# Patient Record
Sex: FEMALE | Race: WHITE | Employment: OTHER | URBAN - NONMETROPOLITAN AREA
[De-identification: names, ages, dates, MRNs, and addresses within clinical notes are randomized per-mention and may not be internally consistent; named-entity substitution may affect disease eponyms.]

---

## 2017-04-11 DIAGNOSIS — H81.02 MENIERE DISEASE, LEFT: ICD-10-CM

## 2017-04-11 DIAGNOSIS — J30.1 SEASONAL ALLERGIC RHINITIS DUE TO POLLEN: ICD-10-CM

## 2017-04-11 RX ORDER — MONTELUKAST SODIUM 10 MG/1
TABLET ORAL
Qty: 30 TABLET | Refills: 5 | Status: SHIPPED | OUTPATIENT
Start: 2017-04-11

## 2017-05-13 DIAGNOSIS — J30.1 SEASONAL ALLERGIC RHINITIS DUE TO POLLEN: ICD-10-CM

## 2017-05-13 DIAGNOSIS — H81.02 MENIERE DISEASE, LEFT: ICD-10-CM

## 2017-05-15 RX ORDER — FLUTICASONE PROPIONATE 50 MCG
SPRAY, SUSPENSION (ML) NASAL
Qty: 16 G | Refills: 3 | Status: SHIPPED | OUTPATIENT
Start: 2017-05-15

## 2017-08-15 ENCOUNTER — TELEPHONE (OUTPATIENT)
Dept: PRIMARY CARE CLINIC | Age: 47
End: 2017-08-15

## 2017-08-15 ENCOUNTER — HOSPITAL ENCOUNTER (OUTPATIENT)
Dept: GENERAL RADIOLOGY | Age: 47
Discharge: HOME | End: 2017-08-15
Payer: COMMERCIAL

## 2017-08-15 ENCOUNTER — OFFICE VISIT (OUTPATIENT)
Dept: PRIMARY CARE CLINIC | Age: 47
End: 2017-08-15
Payer: COMMERCIAL

## 2017-08-15 VITALS
TEMPERATURE: 99 F | BODY MASS INDEX: 22.49 KG/M2 | DIASTOLIC BLOOD PRESSURE: 80 MMHG | HEART RATE: 80 BPM | SYSTOLIC BLOOD PRESSURE: 126 MMHG | OXYGEN SATURATION: 99 % | WEIGHT: 135 LBS | HEIGHT: 65 IN

## 2017-08-15 DIAGNOSIS — R73.03 PREDIABETES: ICD-10-CM

## 2017-08-15 DIAGNOSIS — Z11.4 SCREENING FOR HIV WITHOUT PRESENCE OF RISK FACTORS: ICD-10-CM

## 2017-08-15 DIAGNOSIS — J42 CHRONIC BRONCHITIS, UNSPECIFIED CHRONIC BRONCHITIS TYPE (HCC): ICD-10-CM

## 2017-08-15 DIAGNOSIS — J44.9 COPD (CHRONIC OBSTRUCTIVE PULMONARY DISEASE) WITH CHRONIC BRONCHITIS (HCC): ICD-10-CM

## 2017-08-15 DIAGNOSIS — Z13.220 SCREENING FOR HYPERLIPIDEMIA: ICD-10-CM

## 2017-08-15 DIAGNOSIS — N39.0 URINARY TRACT INFECTION WITHOUT HEMATURIA, SITE UNSPECIFIED: ICD-10-CM

## 2017-08-15 DIAGNOSIS — N39.0 URINARY TRACT INFECTION WITHOUT HEMATURIA, SITE UNSPECIFIED: Primary | ICD-10-CM

## 2017-08-15 PROBLEM — J44.89 COPD (CHRONIC OBSTRUCTIVE PULMONARY DISEASE) WITH CHRONIC BRONCHITIS: Status: ACTIVE | Noted: 2017-08-15

## 2017-08-15 LAB
ALBUMIN SERPL-MCNC: 4.2 G/DL (ref 3.5–5.2)
ALP BLD-CCNC: 32 U/L (ref 35–104)
ALT SERPL-CCNC: 20 U/L (ref 5–33)
ANION GAP SERPL CALCULATED.3IONS-SCNC: 15 MMOL/L (ref 7–19)
AST SERPL-CCNC: 16 U/L (ref 5–32)
BASOPHILS ABSOLUTE: 0.1 K/UL (ref 0–0.2)
BASOPHILS RELATIVE PERCENT: 1 % (ref 0–1)
BILIRUB SERPL-MCNC: 0.3 MG/DL (ref 0.2–1.2)
BILIRUBIN, POC: ABNORMAL
BLOOD URINE, POC: ABNORMAL
BUN BLDV-MCNC: 11 MG/DL (ref 6–20)
CALCIUM SERPL-MCNC: 9.9 MG/DL (ref 8.6–10)
CHLORIDE BLD-SCNC: 99 MMOL/L (ref 98–111)
CLARITY, POC: CLEAR
CO2: 26 MMOL/L (ref 22–29)
COLOR, POC: YELLOW
CREAT SERPL-MCNC: 0.5 MG/DL (ref 0.5–0.9)
EOSINOPHILS ABSOLUTE: 0.1 K/UL (ref 0–0.6)
EOSINOPHILS RELATIVE PERCENT: 0.6 % (ref 0–5)
GFR NON-AFRICAN AMERICAN: >60
GLUCOSE BLD-MCNC: 136 MG/DL (ref 74–109)
GLUCOSE URINE, POC: ABNORMAL
HBA1C MFR BLD: 6.2 %
HCT VFR BLD CALC: 43 % (ref 37–47)
HEMOGLOBIN: 14.8 G/DL (ref 12–16)
KETONES, POC: ABNORMAL
LEUKOCYTE EST, POC: ABNORMAL
LYMPHOCYTES ABSOLUTE: 1.8 K/UL (ref 1.1–4.5)
LYMPHOCYTES RELATIVE PERCENT: 21 % (ref 20–40)
MCH RBC QN AUTO: 31.2 PG (ref 27–31)
MCHC RBC AUTO-ENTMCNC: 34.4 G/DL (ref 33–37)
MCV RBC AUTO: 90.5 FL (ref 81–99)
MONOCYTES ABSOLUTE: 0.7 K/UL (ref 0–0.9)
MONOCYTES RELATIVE PERCENT: 7.7 % (ref 0–10)
NEUTROPHILS ABSOLUTE: 6 K/UL (ref 1.5–7.5)
NEUTROPHILS RELATIVE PERCENT: 69.4 % (ref 50–65)
NITRITE, POC: ABNORMAL
PDW BLD-RTO: 11.4 % (ref 11.5–14.5)
PH, POC: 5.5
PLATELET # BLD: 291 K/UL (ref 130–400)
PMV BLD AUTO: 11.1 FL (ref 9.4–12.3)
POTASSIUM SERPL-SCNC: 4.2 MMOL/L (ref 3.5–5)
PROTEIN, POC: ABNORMAL
RBC # BLD: 4.75 M/UL (ref 4.2–5.4)
SODIUM BLD-SCNC: 140 MMOL/L (ref 136–145)
SPECIFIC GRAVITY, POC: 1.02
TOTAL PROTEIN: 7.2 G/DL (ref 6.6–8.7)
UROBILINOGEN, POC: 0.2
WBC # BLD: 8.6 K/UL (ref 4.8–10.8)

## 2017-08-15 PROCEDURE — 99214 OFFICE O/P EST MOD 30 MIN: CPT | Performed by: NURSE PRACTITIONER

## 2017-08-15 PROCEDURE — 81002 URINALYSIS NONAUTO W/O SCOPE: CPT | Performed by: NURSE PRACTITIONER

## 2017-08-15 PROCEDURE — 71020 XR CHEST STANDARD TWO VW: CPT

## 2017-08-15 RX ORDER — PHENAZOPYRIDINE HYDROCHLORIDE 100 MG/1
100 TABLET, FILM COATED ORAL 3 TIMES DAILY PRN
Qty: 9 TABLET | Refills: 0 | Status: SHIPPED | OUTPATIENT
Start: 2017-08-15 | End: 2017-09-05

## 2017-08-15 RX ORDER — NITROFURANTOIN 25; 75 MG/1; MG/1
100 CAPSULE ORAL 2 TIMES DAILY
Qty: 20 CAPSULE | Refills: 0 | Status: SHIPPED | OUTPATIENT
Start: 2017-08-15 | End: 2017-08-25

## 2017-08-15 ASSESSMENT — ENCOUNTER SYMPTOMS
SINUS PRESSURE: 0
SHORTNESS OF BREATH: 1
WHEEZING: 1
VOICE CHANGE: 1
CHEST TIGHTNESS: 0
EYES NEGATIVE: 1
COUGH: 0
BACK PAIN: 1
SORE THROAT: 0
GASTROINTESTINAL NEGATIVE: 1

## 2017-08-16 ENCOUNTER — TELEPHONE (OUTPATIENT)
Dept: PRIMARY CARE CLINIC | Age: 47
End: 2017-08-16

## 2017-08-17 ENCOUNTER — TELEPHONE (OUTPATIENT)
Dept: PRIMARY CARE CLINIC | Age: 47
End: 2017-08-17

## 2017-08-17 LAB — URINE CULTURE, ROUTINE: NORMAL

## 2017-09-05 ENCOUNTER — TELEPHONE (OUTPATIENT)
Dept: PRIMARY CARE CLINIC | Age: 47
End: 2017-09-05

## 2017-09-05 ENCOUNTER — OFFICE VISIT (OUTPATIENT)
Dept: PRIMARY CARE CLINIC | Age: 47
End: 2017-09-05
Payer: COMMERCIAL

## 2017-09-05 VITALS
TEMPERATURE: 98.6 F | DIASTOLIC BLOOD PRESSURE: 82 MMHG | BODY MASS INDEX: 22.16 KG/M2 | SYSTOLIC BLOOD PRESSURE: 124 MMHG | HEIGHT: 65 IN | WEIGHT: 133 LBS | OXYGEN SATURATION: 96 % | HEART RATE: 80 BPM

## 2017-09-05 DIAGNOSIS — J42 CHRONIC BRONCHITIS, UNSPECIFIED CHRONIC BRONCHITIS TYPE (HCC): Primary | ICD-10-CM

## 2017-09-05 PROCEDURE — 99213 OFFICE O/P EST LOW 20 MIN: CPT | Performed by: FAMILY MEDICINE

## 2017-09-05 RX ORDER — DOXYCYCLINE HYCLATE 100 MG
100 TABLET ORAL 2 TIMES DAILY
Qty: 20 TABLET | Refills: 0 | Status: SHIPPED | OUTPATIENT
Start: 2017-09-05 | End: 2017-09-15

## 2017-09-05 RX ORDER — METHYLPREDNISOLONE 4 MG/1
TABLET ORAL
Qty: 21 TABLET | Refills: 0 | Status: SHIPPED | OUTPATIENT
Start: 2017-09-05 | End: 2017-09-11

## 2017-09-05 ASSESSMENT — ENCOUNTER SYMPTOMS
SHORTNESS OF BREATH: 0
COLOR CHANGE: 0
SORE THROAT: 0
COUGH: 1

## 2017-10-31 ENCOUNTER — TELEPHONE (OUTPATIENT)
Dept: PRIMARY CARE CLINIC | Age: 47
End: 2017-10-31

## 2018-02-05 ENCOUNTER — HOSPITAL ENCOUNTER (OUTPATIENT)
Dept: GENERAL RADIOLOGY | Age: 48
Discharge: HOME | End: 2018-02-05
Payer: COMMERCIAL

## 2018-02-05 ENCOUNTER — OFFICE VISIT (OUTPATIENT)
Dept: URGENT CARE | Age: 48
End: 2018-02-05
Payer: COMMERCIAL

## 2018-02-05 VITALS
HEART RATE: 79 BPM | RESPIRATION RATE: 20 BRPM | BODY MASS INDEX: 22.42 KG/M2 | SYSTOLIC BLOOD PRESSURE: 138 MMHG | DIASTOLIC BLOOD PRESSURE: 82 MMHG | OXYGEN SATURATION: 99 % | WEIGHT: 134.6 LBS | HEIGHT: 65 IN | TEMPERATURE: 97.9 F

## 2018-02-05 DIAGNOSIS — M54.50 ACUTE MIDLINE LOW BACK PAIN WITHOUT SCIATICA: Primary | ICD-10-CM

## 2018-02-05 DIAGNOSIS — M54.50 ACUTE MIDLINE LOW BACK PAIN WITHOUT SCIATICA: ICD-10-CM

## 2018-02-05 DIAGNOSIS — M53.3 SACRAL PAIN: ICD-10-CM

## 2018-02-05 PROCEDURE — 72100 X-RAY EXAM L-S SPINE 2/3 VWS: CPT

## 2018-02-05 PROCEDURE — 72220 X-RAY EXAM SACRUM TAILBONE: CPT

## 2018-02-05 PROCEDURE — 96372 THER/PROPH/DIAG INJ SC/IM: CPT | Performed by: PHYSICIAN ASSISTANT

## 2018-02-05 PROCEDURE — 99213 OFFICE O/P EST LOW 20 MIN: CPT | Performed by: PHYSICIAN ASSISTANT

## 2018-02-05 RX ORDER — OMEPRAZOLE 20 MG/1
20 CAPSULE, DELAYED RELEASE ORAL DAILY
COMMUNITY
End: 2018-10-24

## 2018-02-05 RX ORDER — CYCLOBENZAPRINE HCL 5 MG
5 TABLET ORAL NIGHTLY PRN
Qty: 20 TABLET | Refills: 0 | Status: SHIPPED | OUTPATIENT
Start: 2018-02-05 | End: 2018-05-22

## 2018-02-05 RX ORDER — KETOROLAC TROMETHAMINE 30 MG/ML
30 INJECTION, SOLUTION INTRAMUSCULAR; INTRAVENOUS ONCE
Status: COMPLETED | OUTPATIENT
Start: 2018-02-05 | End: 2018-02-05

## 2018-02-05 RX ADMIN — KETOROLAC TROMETHAMINE 30 MG: 30 INJECTION, SOLUTION INTRAMUSCULAR; INTRAVENOUS at 09:12

## 2018-05-22 ENCOUNTER — HOSPITAL ENCOUNTER (OUTPATIENT)
Dept: CT IMAGING | Age: 48
Discharge: HOME | End: 2018-05-22
Payer: COMMERCIAL

## 2018-05-22 ENCOUNTER — TELEPHONE (OUTPATIENT)
Dept: PRIMARY CARE CLINIC | Age: 48
End: 2018-05-22

## 2018-05-22 ENCOUNTER — OFFICE VISIT (OUTPATIENT)
Dept: PRIMARY CARE CLINIC | Age: 48
End: 2018-05-22
Payer: COMMERCIAL

## 2018-05-22 VITALS
SYSTOLIC BLOOD PRESSURE: 152 MMHG | HEART RATE: 76 BPM | BODY MASS INDEX: 22.02 KG/M2 | HEIGHT: 65 IN | WEIGHT: 132.2 LBS | DIASTOLIC BLOOD PRESSURE: 110 MMHG | TEMPERATURE: 98.8 F | OXYGEN SATURATION: 99 %

## 2018-05-22 DIAGNOSIS — F80.9 DELAYED SPEECH: ICD-10-CM

## 2018-05-22 DIAGNOSIS — I10 ESSENTIAL HYPERTENSION: Primary | ICD-10-CM

## 2018-05-22 DIAGNOSIS — R42 DIZZINESS: ICD-10-CM

## 2018-05-22 DIAGNOSIS — Z13.220 SCREENING FOR HYPERLIPIDEMIA: ICD-10-CM

## 2018-05-22 LAB
ALBUMIN SERPL-MCNC: 4.3 G/DL (ref 3.5–5.2)
ALP BLD-CCNC: 37 U/L (ref 35–104)
ALT SERPL-CCNC: 22 U/L (ref 5–33)
ANION GAP SERPL CALCULATED.3IONS-SCNC: 16 MMOL/L (ref 7–19)
AST SERPL-CCNC: 15 U/L (ref 5–32)
BILIRUB SERPL-MCNC: <0.2 MG/DL (ref 0.2–1.2)
BUN BLDV-MCNC: 11 MG/DL (ref 6–20)
CALCIUM SERPL-MCNC: 9.3 MG/DL (ref 8.6–10)
CHLORIDE BLD-SCNC: 100 MMOL/L (ref 98–111)
CHOLESTEROL, TOTAL: 168 MG/DL (ref 160–199)
CO2: 24 MMOL/L (ref 22–29)
CREAT SERPL-MCNC: 0.6 MG/DL (ref 0.5–0.9)
GFR NON-AFRICAN AMERICAN: >60
GLUCOSE BLD-MCNC: 100 MG/DL (ref 74–109)
HCT VFR BLD CALC: 46.2 % (ref 37–47)
HDLC SERPL-MCNC: 50 MG/DL (ref 65–121)
HEMOGLOBIN: 15.7 G/DL (ref 12–16)
LDL CHOLESTEROL CALCULATED: 83 MG/DL
MCH RBC QN AUTO: 30.1 PG (ref 27–31)
MCHC RBC AUTO-ENTMCNC: 34 G/DL (ref 33–37)
MCV RBC AUTO: 88.5 FL (ref 81–99)
PDW BLD-RTO: 11.9 % (ref 11.5–14.5)
PLATELET # BLD: 289 K/UL (ref 130–400)
PMV BLD AUTO: 10.8 FL (ref 9.4–12.3)
POTASSIUM SERPL-SCNC: 3.9 MMOL/L (ref 3.5–5)
RBC # BLD: 5.22 M/UL (ref 4.2–5.4)
SODIUM BLD-SCNC: 140 MMOL/L (ref 136–145)
TOTAL PROTEIN: 7.3 G/DL (ref 6.6–8.7)
TRIGL SERPL-MCNC: 175 MG/DL (ref 0–149)
WBC # BLD: 9.4 K/UL (ref 4.8–10.8)

## 2018-05-22 PROCEDURE — 70450 CT HEAD/BRAIN W/O DYE: CPT

## 2018-05-22 PROCEDURE — 99214 OFFICE O/P EST MOD 30 MIN: CPT | Performed by: NURSE PRACTITIONER

## 2018-05-22 RX ORDER — LISINOPRIL 10 MG/1
10 TABLET ORAL DAILY
Qty: 30 TABLET | Refills: 3 | Status: SHIPPED | OUTPATIENT
Start: 2018-05-22 | End: 2018-08-29 | Stop reason: ALTCHOICE

## 2018-05-22 RX ORDER — GABAPENTIN 300 MG/1
300 CAPSULE ORAL NIGHTLY
COMMUNITY
End: 2018-08-29 | Stop reason: SDUPTHER

## 2018-05-22 ASSESSMENT — ENCOUNTER SYMPTOMS
COUGH: 0
SINUS PRESSURE: 0
DIARRHEA: 0
NAUSEA: 0
ABDOMINAL PAIN: 0
WHEEZING: 0
VOMITING: 0
PHOTOPHOBIA: 0
SHORTNESS OF BREATH: 0
BACK PAIN: 0

## 2018-05-22 ASSESSMENT — PATIENT HEALTH QUESTIONNAIRE - PHQ9
2. FEELING DOWN, DEPRESSED OR HOPELESS: 0
SUM OF ALL RESPONSES TO PHQ QUESTIONS 1-9: 0
1. LITTLE INTEREST OR PLEASURE IN DOING THINGS: 0
SUM OF ALL RESPONSES TO PHQ9 QUESTIONS 1 & 2: 0

## 2018-05-23 ENCOUNTER — TELEPHONE (OUTPATIENT)
Dept: PRIMARY CARE CLINIC | Age: 48
End: 2018-05-23

## 2018-05-24 ENCOUNTER — TELEPHONE (OUTPATIENT)
Dept: PRIMARY CARE CLINIC | Age: 48
End: 2018-05-24

## 2018-05-25 ENCOUNTER — TELEPHONE (OUTPATIENT)
Dept: PRIMARY CARE CLINIC | Age: 48
End: 2018-05-25

## 2018-05-29 ENCOUNTER — OFFICE VISIT (OUTPATIENT)
Dept: PRIMARY CARE CLINIC | Age: 48
End: 2018-05-29
Payer: COMMERCIAL

## 2018-05-29 VITALS
OXYGEN SATURATION: 98 % | HEART RATE: 80 BPM | RESPIRATION RATE: 18 BRPM | BODY MASS INDEX: 21.83 KG/M2 | DIASTOLIC BLOOD PRESSURE: 88 MMHG | WEIGHT: 131 LBS | HEIGHT: 65 IN | SYSTOLIC BLOOD PRESSURE: 138 MMHG

## 2018-05-29 DIAGNOSIS — R53.83 OTHER FATIGUE: ICD-10-CM

## 2018-05-29 DIAGNOSIS — L65.9 HAIR LOSS: ICD-10-CM

## 2018-05-29 DIAGNOSIS — M79.7 FIBROMYALGIA: ICD-10-CM

## 2018-05-29 DIAGNOSIS — E78.00 ELEVATED CHOLESTEROL: ICD-10-CM

## 2018-05-29 DIAGNOSIS — R03.0 ELEVATED BLOOD PRESSURE, SITUATIONAL: ICD-10-CM

## 2018-05-29 DIAGNOSIS — R23.2 HOT FLASHES: ICD-10-CM

## 2018-05-29 DIAGNOSIS — E55.9 VITAMIN D DEFICIENCY: ICD-10-CM

## 2018-05-29 DIAGNOSIS — F41.9 ANXIETY: ICD-10-CM

## 2018-05-29 DIAGNOSIS — R73.9 ELEVATED BLOOD SUGAR: ICD-10-CM

## 2018-05-29 DIAGNOSIS — G47.00 INSOMNIA, UNSPECIFIED TYPE: Primary | ICD-10-CM

## 2018-05-29 DIAGNOSIS — F32.A DEPRESSION, UNSPECIFIED DEPRESSION TYPE: ICD-10-CM

## 2018-05-29 DIAGNOSIS — Z87.898 HISTORY OF PITUITARY TUMOR: ICD-10-CM

## 2018-05-29 DIAGNOSIS — G89.29 OTHER CHRONIC PAIN: ICD-10-CM

## 2018-05-29 PROCEDURE — 99214 OFFICE O/P EST MOD 30 MIN: CPT | Performed by: NURSE PRACTITIONER

## 2018-05-29 RX ORDER — BUSPIRONE HYDROCHLORIDE 10 MG/1
TABLET ORAL
Qty: 90 TABLET | Refills: 2 | Status: SHIPPED | OUTPATIENT
Start: 2018-05-29 | End: 2018-12-12 | Stop reason: SDUPTHER

## 2018-05-29 RX ORDER — AMITRIPTYLINE HYDROCHLORIDE 25 MG/1
25 TABLET, FILM COATED ORAL NIGHTLY
Qty: 30 TABLET | Refills: 3 | Status: SHIPPED | OUTPATIENT
Start: 2018-05-29 | End: 2018-07-27 | Stop reason: SDUPTHER

## 2018-05-29 ASSESSMENT — ENCOUNTER SYMPTOMS
ABDOMINAL PAIN: 0
BLOOD IN STOOL: 0
TROUBLE SWALLOWING: 0
COUGH: 0
VOICE CHANGE: 0
CONSTIPATION: 0
WHEEZING: 0
VOMITING: 0
NAUSEA: 0
DIARRHEA: 0
CHEST TIGHTNESS: 0
SHORTNESS OF BREATH: 0
SORE THROAT: 0
RHINORRHEA: 0
EYE REDNESS: 0

## 2018-06-06 DIAGNOSIS — R73.9 ELEVATED BLOOD SUGAR: ICD-10-CM

## 2018-06-06 DIAGNOSIS — F41.9 ANXIETY: ICD-10-CM

## 2018-06-06 DIAGNOSIS — E78.00 ELEVATED CHOLESTEROL: ICD-10-CM

## 2018-06-06 DIAGNOSIS — G47.00 INSOMNIA, UNSPECIFIED TYPE: ICD-10-CM

## 2018-06-06 DIAGNOSIS — L65.9 HAIR LOSS: ICD-10-CM

## 2018-06-06 DIAGNOSIS — E55.9 VITAMIN D DEFICIENCY: ICD-10-CM

## 2018-06-06 DIAGNOSIS — R23.2 HOT FLASHES: ICD-10-CM

## 2018-06-06 DIAGNOSIS — G89.29 OTHER CHRONIC PAIN: ICD-10-CM

## 2018-06-06 DIAGNOSIS — R53.83 OTHER FATIGUE: ICD-10-CM

## 2018-06-06 DIAGNOSIS — Z87.898 HISTORY OF PITUITARY TUMOR: ICD-10-CM

## 2018-06-06 LAB
BILIRUBIN URINE: NEGATIVE
BLOOD, URINE: NEGATIVE
C-REACTIVE PROTEIN: <0.03 MG/DL (ref 0–0.5)
CHOLESTEROL, TOTAL: 157 MG/DL (ref 160–199)
CLARITY: ABNORMAL
COLOR: ABNORMAL
ESTRADIOL LEVEL: 93.9 PG/ML
FOLLICLE STIMULATING HORMONE: 10.3 MIU/ML
GLUCOSE URINE: NEGATIVE MG/DL
HBA1C MFR BLD: 5.5 %
HDLC SERPL-MCNC: 42 MG/DL (ref 65–121)
KETONES, URINE: NEGATIVE MG/DL
LDL CHOLESTEROL CALCULATED: 96 MG/DL
LEUKOCYTE ESTERASE, URINE: NEGATIVE
LUTEINIZING HORMONE: 9.5 MIU/ML
NITRITE, URINE: NEGATIVE
PH UA: 6
PROGESTERONE LEVEL: 0.15 NG/ML
PROTEIN UA: NEGATIVE MG/DL
RHEUMATOID FACTOR: <10 IU/ML
SPECIFIC GRAVITY UA: 1.03
T4 FREE: 1.5 NG/DL (ref 0.9–1.7)
TESTOSTERONE TOTAL: 11.4 NG/DL (ref 8.4–48.1)
TRIGL SERPL-MCNC: 95 MG/DL (ref 0–149)
TSH SERPL DL<=0.05 MIU/L-ACNC: 2.75 UIU/ML (ref 0.27–4.2)
UROBILINOGEN, URINE: 0.2 E.U./DL
VITAMIN B-12: 461 PG/ML (ref 211–946)

## 2018-06-07 LAB
PROLACTIN: 18.1 NG/ML (ref 2.8–26)
THYROGLOBULIN AB: <0.9 IU/ML (ref 0–4)
THYROID PEROXIDASE (TPO) ABS: 2.1 IU/ML (ref 0–9)

## 2018-06-08 LAB — ANA IGG, ELISA: NORMAL

## 2018-06-11 LAB
ESTRADIOL LEVEL: 93.9 PG/ML
ESTROGEN TOTAL: 138.8 PG/ML
ESTRONE: 44.9 PG/ML
VITAMIN D2 AND D3, TOTAL: 50.8 NG/ML (ref 30–80)
VITAMIN D2, 25 HYDROXY: 3.4 NG/ML
VITAMIN D3,25 HYDROXY: 47.4 NG/ML

## 2018-06-12 ENCOUNTER — PATIENT MESSAGE (OUTPATIENT)
Dept: PRIMARY CARE CLINIC | Age: 48
End: 2018-06-12

## 2018-07-27 ENCOUNTER — OFFICE VISIT (OUTPATIENT)
Dept: PRIMARY CARE CLINIC | Age: 48
End: 2018-07-27
Payer: COMMERCIAL

## 2018-07-27 ENCOUNTER — TELEPHONE (OUTPATIENT)
Dept: PRIMARY CARE CLINIC | Age: 48
End: 2018-07-27

## 2018-07-27 VITALS
HEIGHT: 65 IN | OXYGEN SATURATION: 100 % | DIASTOLIC BLOOD PRESSURE: 73 MMHG | HEART RATE: 79 BPM | WEIGHT: 132.8 LBS | SYSTOLIC BLOOD PRESSURE: 133 MMHG | BODY MASS INDEX: 22.13 KG/M2 | TEMPERATURE: 98.4 F

## 2018-07-27 DIAGNOSIS — M79.7 FIBROMYALGIA: ICD-10-CM

## 2018-07-27 DIAGNOSIS — N95.2 VAGINAL ATROPHY: ICD-10-CM

## 2018-07-27 DIAGNOSIS — M54.9 CHRONIC BACK PAIN, UNSPECIFIED BACK LOCATION, UNSPECIFIED BACK PAIN LATERALITY: Primary | ICD-10-CM

## 2018-07-27 DIAGNOSIS — G47.00 INSOMNIA, UNSPECIFIED TYPE: ICD-10-CM

## 2018-07-27 DIAGNOSIS — G89.29 OTHER CHRONIC PAIN: ICD-10-CM

## 2018-07-27 DIAGNOSIS — F32.A DEPRESSION, UNSPECIFIED DEPRESSION TYPE: ICD-10-CM

## 2018-07-27 DIAGNOSIS — G89.29 CHRONIC BACK PAIN, UNSPECIFIED BACK LOCATION, UNSPECIFIED BACK PAIN LATERALITY: Primary | ICD-10-CM

## 2018-07-27 PROCEDURE — 99214 OFFICE O/P EST MOD 30 MIN: CPT | Performed by: NURSE PRACTITIONER

## 2018-07-27 RX ORDER — AMITRIPTYLINE HYDROCHLORIDE 25 MG/1
50 TABLET, FILM COATED ORAL NIGHTLY
Qty: 60 TABLET | Refills: 2 | Status: SHIPPED | OUTPATIENT
Start: 2018-07-27 | End: 2018-08-29 | Stop reason: SDUPTHER

## 2018-07-27 RX ORDER — DESVENLAFAXINE 25 MG/1
25 TABLET, EXTENDED RELEASE ORAL DAILY
Qty: 60 TABLET | Refills: 1 | Status: SHIPPED | OUTPATIENT
Start: 2018-07-27 | End: 2018-08-29 | Stop reason: SDUPTHER

## 2018-07-27 NOTE — PATIENT INSTRUCTIONS
Increase amitriptyline to 50mg at bedtime. Begin pristiq as directed. Begin premarin vaginal cream as directed. Follow up in one month.

## 2018-07-27 NOTE — PROGRESS NOTES
2000?    ?    HYSTERECTOMY      LIVER BIOPSY  07/2015    Dr. Mcgowan Fairly History    Marital status:      Spouse name: N/A    Number of children: N/A    Years of education: N/A     Social History Main Topics    Smoking status: Never Smoker    Smokeless tobacco: Never Used    Alcohol use No    Drug use: No    Sexual activity: Not Asked     Other Topics Concern    None     Social History Narrative    None       Family History   Problem Relation Age of Onset    Colon Polyps Paternal Grandfather     Liver Cancer Paternal Grandfather     Colon Polyps Mother     Liver Cancer Mother     Heart Disease Mother     Cancer Mother     Diabetes Mother     Colon Polyps Maternal Grandmother     Heart Disease Father     Cancer Father     Colon Cancer Neg Hx     Esophageal Cancer Neg Hx     Liver Disease Neg Hx     Rectal Cancer Neg Hx     Stomach Cancer Neg Hx        Current Outpatient Prescriptions   Medication Sig Dispense Refill    amitriptyline (ELAVIL) 25 MG tablet Take 2 tablets by mouth nightly 60 tablet 2    conjugated estrogens (PREMARIN) 0.625 MG/GM vaginal cream Place vaginally at bedtime three times a week. 1 Tube 2    desvenlafaxine succinate (PRISTIQ) 25 MG TB24 extended release tablet Take 1 tablet by mouth daily Take one tablet daily for 3 days then increase to two tablets daily. 60 tablet 1    busPIRone (BUSPAR) 10 MG tablet Take tid prn anxiety. 90 tablet 2    gabapentin (NEURONTIN) 300 MG capsule Take 300 mg by mouth nightly. [place] omeprazole (PRILOSEC) 20 MG delayed release capsule Take 20 mg by mouth daily      Cholecalciferol (VITAMIN D3) 73221 UNITS CAPS Take by mouth      fluticasone (FLONASE) 50 MCG/ACT nasal spray 1 spray by Nasal route daily      ondansetron (ZOFRAN ODT) 4 MG disintegrating tablet Take 1 tablet by mouth every 8 hours as needed for Nausea or Vomiting 10 tablet 0    lisinopril (PRINIVIL;ZESTRIL) 10 MG tablet Take 1 tablet by mouth daily 30 tablet 3     No current facility-administered medications for this visit. Allergies   Allergen Reactions    Latex Rash, Itching, Swelling and Hives    Morphine Anaphylaxis    Morphine And Related Anaphylaxis       Lab Review not applicable    Subjective:   Review of Systems   Constitutional: Positive for fatigue. Negative for activity change, appetite change, fever and unexpected weight change. HENT: Negative for congestion, ear pain, nosebleeds, rhinorrhea, sore throat, trouble swallowing and voice change. Eyes: Negative for redness and visual disturbance. Respiratory: Negative for cough, chest tightness, shortness of breath and wheezing. Cardiovascular: Negative for chest pain, palpitations and leg swelling. Gastrointestinal: Negative for abdominal pain, blood in stool, constipation, diarrhea, nausea and vomiting. Endocrine: Negative for polydipsia, polyphagia and polyuria. Genitourinary: Positive for vaginal pain (with intercourse). Negative for dysuria, frequency and urgency. Musculoskeletal: Positive for arthralgias, myalgias, neck pain and neck stiffness. Skin: Negative for rash and wound. Neurological: Negative for dizziness, speech difficulty, light-headedness and headaches. Psychiatric/Behavioral: Negative for agitation, confusion, self-injury and suicidal ideas. The patient is not nervous/anxious. Objective:     Physical Exam   Constitutional: She is oriented to person, place, and time. She appears well-developed and well-nourished. No distress. HENT:   Head: Normocephalic and atraumatic. Right Ear: External ear normal.   Left Ear: External ear normal.   Nose: Nose normal.   Mouth/Throat: Oropharynx is clear and moist. No oropharyngeal exudate. Eyes: Conjunctivae are normal. Pupils are equal, round, and reactive to light. Right eye exhibits no discharge. Left eye exhibits no discharge. Neck: Normal range of motion. Neck supple. Cardiovascular: Normal rate, regular rhythm, normal heart sounds and intact distal pulses. No murmur heard. Pulmonary/Chest: Effort normal and breath sounds normal. No stridor. No respiratory distress. She has no wheezes. She has no rales. She exhibits no tenderness. Right breast exhibits no inverted nipple, no mass, no nipple discharge, no skin change and no tenderness. Left breast exhibits no inverted nipple, no mass, no nipple discharge, no skin change and no tenderness. Abdominal: Soft. Bowel sounds are normal. She exhibits no distension. There is no tenderness. Musculoskeletal: Normal range of motion. She exhibits no edema, tenderness or deformity. Arms:       Legs:  Neurological: She is alert and oriented to person, place, and time. She has normal reflexes. No cranial nerve deficit. Coordination normal.   Skin: Skin is warm and dry. No rash noted. She is not diaphoretic. No erythema. Psychiatric: Her behavior is normal. Thought content normal.   Nursing note and vitals reviewed. /73   Pulse 79   Temp 98.4 °F (36.9 °C)   Ht 5' 5\" (1.651 m)   Wt 132 lb 12.8 oz (60.2 kg)   SpO2 100%   BMI 22.10 kg/m²     Assessment:      Diagnosis Orders   1. Chronic back pain, unspecified back location, unspecified back pain laterality  amitriptyline (ELAVIL) 25 MG tablet   2. Insomnia, unspecified type  amitriptyline (ELAVIL) 25 MG tablet   3. Other chronic pain  amitriptyline (ELAVIL) 25 MG tablet   4. Vaginal atrophy  conjugated estrogens (PREMARIN) 0.625 MG/GM vaginal cream   5. Hormone deficiency  conjugated estrogens (PREMARIN) 0.625 MG/GM vaginal cream   6. Depression, unspecified depression type  desvenlafaxine succinate (PRISTIQ) 25 MG TB24 extended release tablet   7. Fibromyalgia       No results found for this visit on 07/27/18. Plan:     1. Chronic back pain, unspecified back location, unspecified back pain laterality    2. Insomnia, unspecified type    3.  Other chronic pain

## 2018-07-29 PROBLEM — E34.9 HORMONE DEFICIENCY: Status: ACTIVE | Noted: 2018-07-29

## 2018-07-29 PROBLEM — F32.A DEPRESSION: Status: ACTIVE | Noted: 2018-07-29

## 2018-07-29 PROBLEM — N95.2 VAGINAL ATROPHY: Status: ACTIVE | Noted: 2018-07-29

## 2018-07-29 PROBLEM — G89.29 CHRONIC BACK PAIN: Status: ACTIVE | Noted: 2018-07-29

## 2018-07-29 PROBLEM — M54.9 CHRONIC BACK PAIN: Status: ACTIVE | Noted: 2018-07-29

## 2018-07-29 PROBLEM — G89.29 OTHER CHRONIC PAIN: Status: ACTIVE | Noted: 2018-07-29

## 2018-07-29 PROBLEM — M79.7 FIBROMYALGIA: Status: ACTIVE | Noted: 2018-07-29

## 2018-07-29 PROBLEM — G47.00 INSOMNIA: Status: ACTIVE | Noted: 2018-07-29

## 2018-07-29 ASSESSMENT — ENCOUNTER SYMPTOMS
SORE THROAT: 0
COUGH: 0
DIARRHEA: 0
CONSTIPATION: 0
VOMITING: 0
BLOOD IN STOOL: 0
TROUBLE SWALLOWING: 0
SHORTNESS OF BREATH: 0
CHEST TIGHTNESS: 0
RHINORRHEA: 0
NAUSEA: 0
VOICE CHANGE: 0
ABDOMINAL PAIN: 0
EYE REDNESS: 0
WHEEZING: 0

## 2018-08-29 ENCOUNTER — OFFICE VISIT (OUTPATIENT)
Dept: PRIMARY CARE CLINIC | Age: 48
End: 2018-08-29
Payer: COMMERCIAL

## 2018-08-29 VITALS
HEIGHT: 65 IN | WEIGHT: 133 LBS | TEMPERATURE: 98.6 F | HEART RATE: 77 BPM | OXYGEN SATURATION: 99 % | SYSTOLIC BLOOD PRESSURE: 136 MMHG | BODY MASS INDEX: 22.16 KG/M2 | DIASTOLIC BLOOD PRESSURE: 87 MMHG

## 2018-08-29 DIAGNOSIS — R53.83 OTHER FATIGUE: ICD-10-CM

## 2018-08-29 DIAGNOSIS — G89.29 CHRONIC BACK PAIN, UNSPECIFIED BACK LOCATION, UNSPECIFIED BACK PAIN LATERALITY: ICD-10-CM

## 2018-08-29 DIAGNOSIS — M54.9 CHRONIC BACK PAIN, UNSPECIFIED BACK LOCATION, UNSPECIFIED BACK PAIN LATERALITY: ICD-10-CM

## 2018-08-29 DIAGNOSIS — F32.A DEPRESSION, UNSPECIFIED DEPRESSION TYPE: ICD-10-CM

## 2018-08-29 DIAGNOSIS — N89.8 VAGINAL DRYNESS: Primary | ICD-10-CM

## 2018-08-29 DIAGNOSIS — K21.9 GASTROESOPHAGEAL REFLUX DISEASE, ESOPHAGITIS PRESENCE NOT SPECIFIED: ICD-10-CM

## 2018-08-29 DIAGNOSIS — G89.29 OTHER CHRONIC PAIN: ICD-10-CM

## 2018-08-29 DIAGNOSIS — M79.7 FIBROMYALGIA: ICD-10-CM

## 2018-08-29 DIAGNOSIS — N95.2 VAGINAL ATROPHY: ICD-10-CM

## 2018-08-29 DIAGNOSIS — J30.89 SEASONAL ALLERGIC RHINITIS DUE TO OTHER ALLERGIC TRIGGER: ICD-10-CM

## 2018-08-29 DIAGNOSIS — G47.00 INSOMNIA, UNSPECIFIED TYPE: ICD-10-CM

## 2018-08-29 PROCEDURE — 99214 OFFICE O/P EST MOD 30 MIN: CPT | Performed by: NURSE PRACTITIONER

## 2018-08-29 RX ORDER — PANTOPRAZOLE SODIUM 40 MG/1
40 TABLET, DELAYED RELEASE ORAL DAILY
Qty: 30 TABLET | Refills: 5 | Status: SHIPPED | OUTPATIENT
Start: 2018-08-29 | End: 2019-08-16

## 2018-08-29 RX ORDER — AMITRIPTYLINE HYDROCHLORIDE 25 MG/1
50 TABLET, FILM COATED ORAL NIGHTLY
Qty: 60 TABLET | Refills: 5 | Status: SHIPPED | OUTPATIENT
Start: 2018-08-29 | End: 2019-07-02 | Stop reason: SDUPTHER

## 2018-08-29 RX ORDER — FLUTICASONE PROPIONATE 50 MCG
1 SPRAY, SUSPENSION (ML) NASAL DAILY
Qty: 1 BOTTLE | Refills: 5 | Status: SHIPPED | OUTPATIENT
Start: 2018-08-29 | End: 2019-09-17 | Stop reason: SDUPTHER

## 2018-08-29 RX ORDER — ESTRADIOL 0.1 MG/G
CREAM VAGINAL
Qty: 2 TUBE | Refills: 5 | Status: SHIPPED | OUTPATIENT
Start: 2018-08-29 | End: 2018-10-24

## 2018-08-29 RX ORDER — GABAPENTIN 300 MG/1
300 CAPSULE ORAL 3 TIMES DAILY
Qty: 90 CAPSULE | Refills: 2 | Status: SHIPPED | OUTPATIENT
Start: 2018-08-29 | End: 2019-04-26 | Stop reason: SDUPTHER

## 2018-08-29 RX ORDER — DESVENLAFAXINE 25 MG/1
25 TABLET, EXTENDED RELEASE ORAL DAILY
Qty: 60 TABLET | Refills: 5 | Status: SHIPPED | OUTPATIENT
Start: 2018-08-29 | End: 2019-08-28 | Stop reason: SDUPTHER

## 2018-08-29 ASSESSMENT — ENCOUNTER SYMPTOMS
RHINORRHEA: 0
ABDOMINAL PAIN: 0
BLOOD IN STOOL: 0
DIARRHEA: 0
EYE REDNESS: 0
SORE THROAT: 0
CHEST TIGHTNESS: 0
SHORTNESS OF BREATH: 0
VOICE CHANGE: 0
CONSTIPATION: 0
COUGH: 0
NAUSEA: 0
VOMITING: 0
TROUBLE SWALLOWING: 0
WHEEZING: 0

## 2018-08-29 NOTE — PATIENT INSTRUCTIONS
Begin vaginal estrogen cream.     Use flonase as directed we will add astelin if needed. Stop prilosec and begin protonix. Increase pristiq if needed.

## 2018-08-29 NOTE — PROGRESS NOTES
[facility]  [address]  [address] [facility] [address]  Dept: [phone]  Dept Fax: [phone]  Loc: [phone]        Arias Medina is a 57 y.o. female who presents today for her medical conditions/ complaints as noted below. Arias Medina is c/o 1 Month Follow-Up        Chief Complaint   Patient presents with    1 Month Follow-Up       HPI:     HPI    Pt states that she is doing good on the increased dose of amitriptyline. Shouldn't states that she is sleeping well. She wishes to continue same. Pt states that she is substantially better in regards to chronic pain in her chest/ knees/ elbows. Pt states that she was at a 7 pain scale last month and has noted I improvement to 2-3. Patient believes the Pristiq has helped her. Patient states that she is taking Pristiq 25 mg daily. Patient states that she tried to increase the dose to 50 and could not tolerate it. Patient states that she then reduce the dose back down to 25 mg. Patient denies SI or HI. She wishes to continue same. Patient reports that they have been compliant with taking medications as directed.      Past Medical History:   Diagnosis Date    Asthma     Chronic back pain 2018    Chronic migraine without aura, intractable, without status migrainosus     Colon polyps     Depression 2018    Elevated blood pressure, situational 2018    Essential tremor     Fibromyalgia     Liver disease     Fatty Liver    Meniere disease 2017    Osteoarthritis     Tinnitus        Past Surgical History:   Procedure Laterality Date     SECTION      CHOLECYSTECTOMY, LAPAROSCOPIC  2015    Dr. Enoch Paige?    ?    HYSTERECTOMY      LIVER BIOPSY  2015    Dr. Emma Arias Marital status:      Spouse name: N/A    Number of children: N/A    Years of education: N/A     Social History Main Topics    Smoking breath and wheezing. Cardiovascular: Negative for chest pain, palpitations and leg swelling. Gastrointestinal: Negative for abdominal pain, blood in stool, constipation, diarrhea, nausea and vomiting. Endocrine: Negative for polydipsia, polyphagia and polyuria. Genitourinary: Positive for vaginal pain (with intercourse). Negative for dysuria, frequency and urgency. Musculoskeletal: Positive for arthralgias, myalgias, neck pain and neck stiffness. All symptoms are improving on medication. Skin: Negative for rash and wound. Neurological: Negative for dizziness, speech difficulty, light-headedness and headaches. Psychiatric/Behavioral: Negative for agitation, confusion, self-injury and suicidal ideas. The patient is not nervous/anxious. Objective:     Physical Exam   Constitutional: She is oriented to person, place, and time. She appears well-developed and well-nourished. No distress. HENT:   Head: Normocephalic and atraumatic. Right Ear: External ear normal.   Left Ear: External ear normal.   Nose: Nose normal.   Mouth/Throat: Oropharynx is clear and moist. No oropharyngeal exudate. Eyes: Pupils are equal, round, and reactive to light. Conjunctivae are normal. Right eye exhibits no discharge. Left eye exhibits no discharge. Neck: Normal range of motion. Neck supple. Cardiovascular: Normal rate, regular rhythm, normal heart sounds and intact distal pulses. No murmur heard. Pulmonary/Chest: Effort normal and breath sounds normal. No stridor. No respiratory distress. She has no wheezes. She has no rales. She exhibits no tenderness. Right breast exhibits no inverted nipple, no mass, no nipple discharge, no skin change and no tenderness. Left breast exhibits no inverted nipple, no mass, no nipple discharge, no skin change and no tenderness. Abdominal: Soft. Bowel sounds are normal. She exhibits no distension. There is no tenderness. Musculoskeletal: Normal range of motion. contact office or report to nearest ER. They voice understanding and agreement with this plan.      Electronically signed by JUAN JOSE Marshall on 8/29/2018 at 10:51 AM

## 2018-10-08 ENCOUNTER — PATIENT MESSAGE (OUTPATIENT)
Dept: PRIMARY CARE CLINIC | Age: 48
End: 2018-10-08

## 2018-10-24 ENCOUNTER — OFFICE VISIT (OUTPATIENT)
Dept: URGENT CARE | Age: 48
End: 2018-10-24
Payer: COMMERCIAL

## 2018-10-24 ENCOUNTER — APPOINTMENT (OUTPATIENT)
Dept: CT IMAGING | Age: 48
End: 2018-10-24
Payer: COMMERCIAL

## 2018-10-24 ENCOUNTER — HOSPITAL ENCOUNTER (EMERGENCY)
Age: 48
Discharge: HOME | End: 2018-10-24
Attending: FAMILY MEDICINE
Payer: COMMERCIAL

## 2018-10-24 VITALS
HEIGHT: 65 IN | BODY MASS INDEX: 22.33 KG/M2 | TEMPERATURE: 98.2 F | SYSTOLIC BLOOD PRESSURE: 155 MMHG | HEART RATE: 78 BPM | OXYGEN SATURATION: 96 % | DIASTOLIC BLOOD PRESSURE: 89 MMHG | RESPIRATION RATE: 16 BRPM | WEIGHT: 134 LBS

## 2018-10-24 VITALS
DIASTOLIC BLOOD PRESSURE: 94 MMHG | HEART RATE: 83 BPM | RESPIRATION RATE: 18 BRPM | BODY MASS INDEX: 22.3 KG/M2 | SYSTOLIC BLOOD PRESSURE: 132 MMHG | TEMPERATURE: 98.2 F | OXYGEN SATURATION: 100 % | WEIGHT: 134 LBS

## 2018-10-24 DIAGNOSIS — I10 HYPERTENSION, UNSPECIFIED TYPE: ICD-10-CM

## 2018-10-24 DIAGNOSIS — H53.8 BLURRED VISION: ICD-10-CM

## 2018-10-24 DIAGNOSIS — R42 DIZZINESS: Primary | ICD-10-CM

## 2018-10-24 DIAGNOSIS — R42 VERTIGO: Primary | ICD-10-CM

## 2018-10-24 LAB
ALBUMIN SERPL-MCNC: 4.5 G/DL (ref 3.5–5.2)
ALP BLD-CCNC: 49 U/L (ref 35–104)
ALT SERPL-CCNC: 19 U/L (ref 5–33)
ANION GAP SERPL CALCULATED.3IONS-SCNC: 12 MMOL/L (ref 7–19)
AST SERPL-CCNC: 23 U/L (ref 5–32)
BASOPHILS ABSOLUTE: 0.1 K/UL (ref 0–0.2)
BASOPHILS RELATIVE PERCENT: 1.1 % (ref 0–1)
BILIRUB SERPL-MCNC: 0.4 MG/DL (ref 0.2–1.2)
BUN BLDV-MCNC: 9 MG/DL (ref 6–20)
CALCIUM SERPL-MCNC: 9.4 MG/DL (ref 8.6–10)
CHLORIDE BLD-SCNC: 102 MMOL/L (ref 98–111)
CO2: 27 MMOL/L (ref 22–29)
CREAT SERPL-MCNC: 0.7 MG/DL (ref 0.5–0.9)
EOSINOPHILS ABSOLUTE: 0.1 K/UL (ref 0–0.6)
EOSINOPHILS RELATIVE PERCENT: 2.2 % (ref 0–5)
GFR NON-AFRICAN AMERICAN: >60
GLUCOSE BLD-MCNC: 106 MG/DL (ref 74–109)
GLUCOSE BLD-MCNC: 133 MG/DL
HCT VFR BLD CALC: 48.3 % (ref 37–47)
HEMOGLOBIN: 16.3 G/DL (ref 12–16)
LYMPHOCYTES ABSOLUTE: 1.8 K/UL (ref 1.1–4.5)
LYMPHOCYTES RELATIVE PERCENT: 33.3 % (ref 20–40)
MCH RBC QN AUTO: 29.6 PG (ref 27–31)
MCHC RBC AUTO-ENTMCNC: 33.7 G/DL (ref 33–37)
MCV RBC AUTO: 87.7 FL (ref 81–99)
MONOCYTES ABSOLUTE: 0.5 K/UL (ref 0–0.9)
MONOCYTES RELATIVE PERCENT: 8.7 % (ref 0–10)
NEUTROPHILS ABSOLUTE: 2.9 K/UL (ref 1.5–7.5)
NEUTROPHILS RELATIVE PERCENT: 54.5 % (ref 50–65)
PDW BLD-RTO: 11.5 % (ref 11.5–14.5)
PLATELET # BLD: 329 K/UL (ref 130–400)
PMV BLD AUTO: 10.5 FL (ref 9.4–12.3)
POTASSIUM SERPL-SCNC: 4 MMOL/L (ref 3.5–5)
RBC # BLD: 5.51 M/UL (ref 4.2–5.4)
SODIUM BLD-SCNC: 141 MMOL/L (ref 136–145)
TOTAL PROTEIN: 7.9 G/DL (ref 6.6–8.7)
WBC # BLD: 5.4 K/UL (ref 4.8–10.8)

## 2018-10-24 PROCEDURE — 99284 EMERGENCY DEPT VISIT MOD MDM: CPT | Performed by: FAMILY MEDICINE

## 2018-10-24 PROCEDURE — 36415 COLL VENOUS BLD VENIPUNCTURE: CPT

## 2018-10-24 PROCEDURE — 6370000000 HC RX 637 (ALT 250 FOR IP): Performed by: FAMILY MEDICINE

## 2018-10-24 PROCEDURE — 93005 ELECTROCARDIOGRAM TRACING: CPT

## 2018-10-24 PROCEDURE — 82962 GLUCOSE BLOOD TEST: CPT | Performed by: NURSE PRACTITIONER

## 2018-10-24 PROCEDURE — 80053 COMPREHEN METABOLIC PANEL: CPT

## 2018-10-24 PROCEDURE — 70450 CT HEAD/BRAIN W/O DYE: CPT

## 2018-10-24 PROCEDURE — 99284 EMERGENCY DEPT VISIT MOD MDM: CPT

## 2018-10-24 PROCEDURE — 85025 COMPLETE CBC W/AUTO DIFF WBC: CPT

## 2018-10-24 RX ORDER — DIAZEPAM 5 MG/1
5 TABLET ORAL EVERY 6 HOURS PRN
Qty: 12 TABLET | Refills: 0 | Status: SHIPPED | OUTPATIENT
Start: 2018-10-24 | End: 2018-10-27

## 2018-10-24 RX ORDER — MULTIVIT WITH MINERALS/LUTEIN
1000 TABLET ORAL DAILY
COMMUNITY

## 2018-10-24 RX ORDER — DIAZEPAM 5 MG/1
5 TABLET ORAL ONCE
Status: COMPLETED | OUTPATIENT
Start: 2018-10-24 | End: 2018-10-24

## 2018-10-24 RX ADMIN — DIAZEPAM 5 MG: 5 TABLET ORAL at 10:07

## 2018-10-24 ASSESSMENT — ENCOUNTER SYMPTOMS
RHINORRHEA: 0
NAUSEA: 0
DIARRHEA: 0
COLOR CHANGE: 0
COUGH: 0
ABDOMINAL PAIN: 0
WHEEZING: 0
CONSTIPATION: 0
BACK PAIN: 0
VOMITING: 0
CHEST TIGHTNESS: 0
SINUS PAIN: 0
SHORTNESS OF BREATH: 0

## 2018-10-24 NOTE — ED PROVIDER NOTES
pallor and rash. Neurological: Positive for dizziness and light-headedness. Negative for syncope, weakness and numbness. Hematological: Does not bruise/bleed easily. PAST MEDICALHISTORY     Past Medical History:   Diagnosis Date    Asthma     Chronic back pain 2018    Chronic migraine without aura, intractable, without status migrainosus     Colon polyps     Depression 2018    Elevated blood pressure, situational 2018    Essential tremor     Fibromyalgia     Liver disease     Fatty Liver    Meniere disease 2017    Osteoarthritis     Tinnitus          SURGICAL HISTORY       Past Surgical History:   Procedure Laterality Date     SECTION      CHOLECYSTECTOMY, LAPAROSCOPIC  2015    Dr. Kim Null?    ?    HYSTERECTOMY      LIVER BIOPSY  2015    Dr. Gerald Mcgraw     Previous Medications    AMITRIPTYLINE (ELAVIL) 25 MG TABLET    Take 2 tablets by mouth nightly    BUSPIRONE (BUSPAR) 10 MG TABLET    Take tid prn anxiety. DESVENLAFAXINE SUCCINATE (PRISTIQ) 25 MG TB24 EXTENDED RELEASE TABLET    Take 1 tablet by mouth daily Take one tablet daily for 3 days then increase to two tablets daily. FLUTICASONE (FLONASE) 50 MCG/ACT NASAL SPRAY    1 spray by Nasal route daily    GABAPENTIN (NEURONTIN) 300 MG CAPSULE    Take 1 capsule by mouth 3 times daily for 30 days. Jimmie Majano ONDANSETRON (ZOFRAN ODT) 4 MG DISINTEGRATING TABLET    Take 1 tablet by mouth every 8 hours as needed for Nausea or Vomiting    PANTOPRAZOLE (PROTONIX) 40 MG TABLET    Take 1 tablet by mouth daily    VITAMIN E 1000 UNITS CAPSULE    Take 1,000 Units by mouth daily       ALLERGIES     Latex;  Codeine; Morphine; and Morphine and related    FAMILY HISTORY       Family History   Problem Relation Age of Onset    Colon Polyps Paternal Grandfather     Liver Cancer Paternal Grandfather     Colon Polyps Mother     Liver Cancer Mother     Heart Disease Mother     Cancer

## 2018-10-26 LAB
EKG P AXIS: 39 DEGREES
EKG P-R INTERVAL: 156 MS
EKG Q-T INTERVAL: 410 MS
EKG QRS DURATION: 80 MS
EKG QTC CALCULATION (BAZETT): 443 MS
EKG T AXIS: 52 DEGREES

## 2018-10-29 ENCOUNTER — OFFICE VISIT (OUTPATIENT)
Dept: PRIMARY CARE CLINIC | Age: 48
End: 2018-10-29
Payer: COMMERCIAL

## 2018-10-29 VITALS
TEMPERATURE: 99.2 F | BODY MASS INDEX: 23.59 KG/M2 | OXYGEN SATURATION: 99 % | DIASTOLIC BLOOD PRESSURE: 74 MMHG | SYSTOLIC BLOOD PRESSURE: 132 MMHG | HEIGHT: 65 IN | HEART RATE: 93 BPM | WEIGHT: 141.6 LBS

## 2018-10-29 DIAGNOSIS — R42 VERTIGO: ICD-10-CM
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
## 2018-10-29 DIAGNOSIS — M54.42 CHRONIC LEFT-SIDED LOW BACK PAIN WITH LEFT-SIDED SCIATICA: Primary | ICD-10-CM

## 2018-10-29 DIAGNOSIS — G89.29 CHRONIC LEFT-SIDED LOW BACK PAIN WITH LEFT-SIDED SCIATICA: Primary | ICD-10-CM

## 2018-10-29 DIAGNOSIS — H81.03 MENIERE'S DISEASE OF BOTH EARS: ICD-10-CM

## 2018-10-29 PROCEDURE — 99213 OFFICE O/P EST LOW 20 MIN: CPT | Performed by: NURSE PRACTITIONER

## 2018-10-29 PROCEDURE — 96372 THER/PROPH/DIAG INJ SC/IM: CPT | Performed by: NURSE PRACTITIONER

## 2018-10-29 RX ORDER — KETOROLAC TROMETHAMINE 30 MG/ML
30 INJECTION, SOLUTION INTRAMUSCULAR; INTRAVENOUS ONCE
Status: COMPLETED | OUTPATIENT
Start: 2018-10-29 | End: 2018-10-29

## 2018-10-29 RX ORDER — METHYLPREDNISOLONE ACETATE 40 MG/ML
40 INJECTION, SUSPENSION INTRA-ARTICULAR; INTRALESIONAL; INTRAMUSCULAR; SOFT TISSUE ONCE
Status: COMPLETED | OUTPATIENT
Start: 2018-10-29 | End: 2018-10-29

## 2018-10-29 RX ADMIN — KETOROLAC TROMETHAMINE 30 MG: 30 INJECTION, SOLUTION INTRAMUSCULAR; INTRAVENOUS at 14:14

## 2018-10-29 RX ADMIN — METHYLPREDNISOLONE ACETATE 40 MG: 40 INJECTION, SUSPENSION INTRA-ARTICULAR; INTRALESIONAL; INTRAMUSCULAR; SOFT TISSUE at 14:16

## 2018-10-29 NOTE — PATIENT INSTRUCTIONS
make you aware of any improvements. Include the date, the time you spent exercising, how often your eyes were open or closed, and how you felt during each exercise. ¨ Walk 5 steps and then stop abruptly. Wait for any dizzy feeling to go away, and do it again. Repeat until you have walked about 50 feet. Walking exercises for vertigo may improve your balance and your symptoms of vertigo. You may want to have someone next to you while you do these exercises in case you lose your balance. ¨ For another walking exercise, walk 5 steps, turn, and walk back. Wait for any dizziness to go away. Repeat 5 more times. ¨ For harder exercise, walk and turn your head to the right or left with every other step. Try to walk about 50 feet. Repeat the exercise while moving your head up and down. Then repeat while moving your head up to one side and down to the other side, and then switching. ¨ Prepare a list that shows the distance you walked, how often you walked, and how you felt while you were walking. · Make sure your home is safe for those times when you have an attack of vertigo. ¨ Get rid of throw rugs, and use nonskid mats. ¨ Use grab bars near the bathtub and toilet. ¨ Use night-lights. ¨ Keep floors dry to prevent slipping. ¨ Store items you use often on low shelves so you do not have to climb or reach high. If you have to climb, use a step stool with handrails. ¨ Keep driveways, sidewalks, and other walkways clear of things that might cause you to trip. · There are other steps you can take to stay safe. ¨ Avoid driving or working at heights. ¨ Wear shoes with low heels and nonslip soles. ¨ Keep your shoes tied. ¨ Alert family and friends to your condition. ¨ Know whether medicines you take can affect your sense of balance. When should you call for help? Call 911 anytime you think you may need emergency care.  For example, call if:    · You passed out (lost consciousness).     · You have symptoms of a

## 2018-10-30 ASSESSMENT — ENCOUNTER SYMPTOMS: BACK PAIN: 1

## 2018-12-12 DIAGNOSIS — K21.9 GASTROESOPHAGEAL REFLUX DISEASE, ESOPHAGITIS PRESENCE NOT SPECIFIED: ICD-10-CM

## 2018-12-12 DIAGNOSIS — G89.29 CHRONIC BACK PAIN, UNSPECIFIED BACK LOCATION, UNSPECIFIED BACK PAIN LATERALITY: ICD-10-CM

## 2018-12-12 DIAGNOSIS — F41.9 ANXIETY: ICD-10-CM

## 2018-12-12 DIAGNOSIS — F32.A DEPRESSION, UNSPECIFIED DEPRESSION TYPE: ICD-10-CM

## 2018-12-12 DIAGNOSIS — M79.7 FIBROMYALGIA: ICD-10-CM

## 2018-12-12 DIAGNOSIS — G47.00 INSOMNIA, UNSPECIFIED TYPE: ICD-10-CM

## 2018-12-12 DIAGNOSIS — G89.29 OTHER CHRONIC PAIN: ICD-10-CM

## 2018-12-12 DIAGNOSIS — M54.9 CHRONIC BACK PAIN, UNSPECIFIED BACK LOCATION, UNSPECIFIED BACK PAIN LATERALITY: ICD-10-CM

## 2018-12-12 RX ORDER — PANTOPRAZOLE SODIUM 40 MG/1
40 TABLET, DELAYED RELEASE ORAL DAILY
Qty: 30 TABLET | Refills: 5 | OUTPATIENT
Start: 2018-12-12

## 2018-12-12 RX ORDER — AMITRIPTYLINE HYDROCHLORIDE 25 MG/1
50 TABLET, FILM COATED ORAL NIGHTLY
Qty: 60 TABLET | Refills: 5 | OUTPATIENT
Start: 2018-12-12 | End: 2019-01-11

## 2018-12-12 RX ORDER — DESVENLAFAXINE 25 MG/1
25 TABLET, EXTENDED RELEASE ORAL DAILY
Qty: 60 TABLET | Refills: 5 | OUTPATIENT
Start: 2018-12-12 | End: 2019-01-11

## 2018-12-12 RX ORDER — GABAPENTIN 300 MG/1
300 CAPSULE ORAL 3 TIMES DAILY
Qty: 90 CAPSULE | Refills: 0 | OUTPATIENT
Start: 2018-12-23 | End: 2019-01-22

## 2018-12-12 RX ORDER — BUSPIRONE HYDROCHLORIDE 10 MG/1
TABLET ORAL
Qty: 90 TABLET | Refills: 0 | Status: SHIPPED | OUTPATIENT
Start: 2018-12-12 | End: 2019-01-11

## 2019-04-22 ENCOUNTER — OFFICE VISIT (OUTPATIENT)
Dept: URGENT CARE | Age: 49
End: 2019-04-22
Payer: COMMERCIAL

## 2019-04-22 VITALS
DIASTOLIC BLOOD PRESSURE: 104 MMHG | HEART RATE: 90 BPM | TEMPERATURE: 98.3 F | RESPIRATION RATE: 20 BRPM | SYSTOLIC BLOOD PRESSURE: 164 MMHG | WEIGHT: 141.6 LBS | OXYGEN SATURATION: 98 % | HEIGHT: 65 IN | BODY MASS INDEX: 23.59 KG/M2

## 2019-04-22 DIAGNOSIS — J06.9 URI, ACUTE: Primary | ICD-10-CM

## 2019-04-22 PROCEDURE — 99213 OFFICE O/P EST LOW 20 MIN: CPT | Performed by: NURSE PRACTITIONER

## 2019-04-22 RX ORDER — BENZONATATE 100 MG/1
100 CAPSULE ORAL 3 TIMES DAILY PRN
Qty: 21 CAPSULE | Refills: 0 | Status: SHIPPED | OUTPATIENT
Start: 2019-04-22 | End: 2019-04-29

## 2019-04-22 RX ORDER — METHYLPREDNISOLONE 4 MG/1
TABLET ORAL
Qty: 1 KIT | Refills: 0 | Status: SHIPPED | OUTPATIENT
Start: 2019-04-22 | End: 2019-08-16

## 2019-04-22 RX ORDER — LORATADINE 10 MG/1
10 TABLET ORAL DAILY
Qty: 30 TABLET | Refills: 0 | Status: SHIPPED | OUTPATIENT
Start: 2019-04-22 | End: 2019-08-16

## 2019-04-22 RX ORDER — BUSPIRONE HYDROCHLORIDE 5 MG/1
5 TABLET ORAL 3 TIMES DAILY PRN
COMMUNITY
End: 2019-04-26 | Stop reason: SDUPTHER

## 2019-04-22 ASSESSMENT — ENCOUNTER SYMPTOMS
COUGH: 1
WHEEZING: 0
RHINORRHEA: 0
SINUS PRESSURE: 0
SINUS PAIN: 0
SHORTNESS OF BREATH: 0
FACIAL SWELLING: 0
SORE THROAT: 0

## 2019-04-22 NOTE — PROGRESS NOTES
[address] [facility]  [address] [facility] [address]  Dept: [phone]  Loc: [phone]     Brandon Young is a 54 y.o. female who presents today for her medical conditions/complaintsas noted below. Brandon Young is c/o of Cough (since yesterday non productive)        HPI:     HPI     URI  This is a new problem. The current episode started in the past 7 days. The problem occurs constantly. The problem has been unchanged. Associated symptoms include congestion, coughing and a sore throat. Pertinent negatives include  abdominal pain, anorexia, arthralgias, change in bowel habit, chest pain, chills, diaphoresis, fatigue, fever, headaches, joint swelling, myalgias, nausea, neck pain, numbness, rash, swollen glands, urinary symptoms, vertigo, visual change, vomiting or weakness. Pt tried OTC meds with mild symptom relief. Denies any other symptoms. No results found for this visit on 19.     Past Medical History:   Diagnosis Date    Asthma     Chronic back pain 2018    Chronic migraine without aura, intractable, without status migrainosus     Colon polyps     Depression 2018    Elevated blood pressure, situational 2018    Essential tremor     Fibromyalgia     Liver disease     Fatty Liver    Meniere disease 2017    Osteoarthritis     Tinnitus       Past Surgical History:   Procedure Laterality Date     SECTION      CHOLECYSTECTOMY, LAPAROSCOPIC  2015    Dr. Zulma Bustillos  ?    ?    HYSTERECTOMY      LIVER BIOPSY  2015    Dr. Katharine Lopez       Family History   Problem Relation Age of Onset    Colon Polyps Paternal Grandfather     Liver Cancer Paternal Grandfather     Colon Polyps Mother     Liver Cancer Mother     Heart Disease Mother     Cancer Mother     Diabetes Mother     Colon Polyps Maternal Grandmother     Heart Disease Father     Cancer Father     Colon Cancer Neg Hx     Esophageal Cancer Neg Hx     Liver Disease Neg Hx     Rectal Cancer Neg Hx     Stomach Cancer Neg Hx        Social History     Tobacco Use    Smoking status: Never Smoker    Smokeless tobacco: Never Used   Substance Use Topics    Alcohol use: No      Current Outpatient Medications   Medication Sig Dispense Refill    dextromethorphan-guaiFENesin (MUCINEX DM)  MG per extended release tablet Take 1 tablet by mouth every 12 hours as needed      busPIRone (BUSPAR) 5 MG tablet Take 5 mg by mouth 3 times daily as needed      loratadine (CLARITIN) 10 MG tablet Take 1 tablet by mouth daily 30 tablet 0    methylPREDNISolone (MEDROL, MAUREEN,) 4 MG tablet Take by mouth. 1 kit 0    benzonatate (TESSALON PERLES) 100 MG capsule Take 1 capsule by mouth 3 times daily as needed for Cough 21 capsule 0    vitamin E 1000 units capsule Take 1,000 Units by mouth daily      fluticasone (FLONASE) 50 MCG/ACT nasal spray 1 spray by Nasal route daily 1 Bottle 5    pantoprazole (PROTONIX) 40 MG tablet Take 1 tablet by mouth daily 30 tablet 5    amitriptyline (ELAVIL) 25 MG tablet Take 2 tablets by mouth nightly 60 tablet 5    desvenlafaxine succinate (PRISTIQ) 25 MG TB24 extended release tablet Take 1 tablet by mouth daily Take one tablet daily for 3 days then increase to two tablets daily. 60 tablet 5    gabapentin (NEURONTIN) 300 MG capsule Take 1 capsule by mouth 3 times daily for 30 days. . 90 capsule 2    ondansetron (ZOFRAN ODT) 4 MG disintegrating tablet Take 1 tablet by mouth every 8 hours as needed for Nausea or Vomiting 10 tablet 0     No current facility-administered medications for this visit.       Allergies   Allergen Reactions    Latex Rash, Itching, Swelling and Hives    Codeine Anaphylaxis    Morphine Anaphylaxis    Morphine And Related Anaphylaxis       Health Maintenance   Topic Date Due    HIV screen  03/08/1987    DTaP/Tdap/Td vaccine (1 - Tdap) 03/08/1991    Cervical cancer screen  10/27/2018    A1C test (Diabetic or Prediabetic)  06/06/2019    Flu vaccine (Season Ended) 09/01/2019    Lipid screen  06/06/2023    Pneumococcal 0-64 years Vaccine  Completed       Subjective:     Review of Systems   Constitutional: Negative. Negative for fever. HENT: Positive for congestion. Negative for ear discharge, ear pain, facial swelling, rhinorrhea, sinus pressure, sinus pain and sore throat. Respiratory: Positive for cough. Negative for shortness of breath and wheezing. Cardiovascular: Negative. Negative for chest pain. Endocrine: Negative. Skin: Negative for rash. Allergic/Immunologic: Negative for environmental allergies. Neurological: Negative for headaches. Objective:     Physical Exam   Constitutional: She is oriented to person, place, and time. Vital signs are normal. She appears well-developed and well-nourished. She is active. Non-toxic appearance. She does not have a sickly appearance. She does not appear ill. No distress. HENT:   Head: Normocephalic and atraumatic. Right Ear: Hearing, tympanic membrane, external ear and ear canal normal.   Left Ear: Hearing, tympanic membrane, external ear and ear canal normal.   Nose: Nose normal. Right sinus exhibits no maxillary sinus tenderness and no frontal sinus tenderness. Left sinus exhibits no maxillary sinus tenderness and no frontal sinus tenderness. Mouth/Throat: Uvula is midline, oropharynx is clear and moist and mucous membranes are normal. Tonsils are 0 on the right. Tonsils are 0 on the left. No tonsillar exudate. Eyes: Pupils are equal, round, and reactive to light. Conjunctivae are normal.   Neck: Trachea normal and normal range of motion. Carotid bruit is not present. No thyroid mass present. Cardiovascular: Normal rate, regular rhythm and normal heart sounds. Pulmonary/Chest: Effort normal and breath sounds normal.   Abdominal: Soft.  Normal appearance and bowel sounds are normal.   Musculoskeletal: Normal range of motion. Lymphadenopathy:        Head (right side): No submental, no submandibular, no tonsillar, no preauricular and no occipital adenopathy present. Head (left side): No submental, no submandibular, no tonsillar, no preauricular, no posterior auricular and no occipital adenopathy present. She has no cervical adenopathy. Neurological: She is alert and oriented to person, place, and time. She has normal strength. She displays a negative Romberg sign. Skin: Skin is warm and intact. Capillary refill takes less than 2 seconds. Psychiatric: She has a normal mood and affect. Her speech is normal and behavior is normal. Judgment and thought content normal. Cognition and memory are normal.   Nursing note and vitals reviewed. BP (!) 164/104   Pulse 90   Temp 98.3 °F (36.8 °C) (Temporal)   Resp 20   Ht 5' 5\" (1.651 m)   Wt 141 lb 9.6 oz (64.2 kg)   SpO2 98%   BMI 23.56 kg/m²     Assessment:          Diagnosis Orders   1. URI, acute  benzonatate (TESSALON PERLES) 100 MG capsule       Plan:    No orders of the defined types were placed in this encounter. No follow-ups on file. No orders of the defined types were placed in this encounter. Orders Placed This Encounter   Medications    loratadine (CLARITIN) 10 MG tablet     Sig: Take 1 tablet by mouth daily     Dispense:  30 tablet     Refill:  0    methylPREDNISolone (MEDROL, MAUREEN,) 4 MG tablet     Sig: Take by mouth. Dispense:  1 kit     Refill:  0    benzonatate (TESSALON PERLES) 100 MG capsule     Sig: Take 1 capsule by mouth 3 times daily as needed for Cough     Dispense:  21 capsule     Refill:  0       Patient given educationalmaterials - see patient instructions. Discussed use, benefit, and side effectsof prescribed medications. All patient questions answered. Pt voiced understanding. Reviewed health maintenance. Instructed to continue current medications, diet andexercise. Patient agreed with treatment plan.  Follow up as directed. Patient Instructions   1. Take medrol dose pack as prescribed  2. Take antihistamine and decongestant as prescribed  3. Take cough suppressants as prescribed  4. Increase fluids and rest  5.  Return to clinic if symptoms worsen or fail to improve          Electronically signed by JUAN JOSE León CNP on 4/22/2019 at 1:32 PM

## 2019-04-22 NOTE — LETTER
[facility]  [facility] [facility] [address]  Phone: [phone]    Akila Baker, APRN - CNP        April 22, 2019     Patient: Marcelo Jo   YOB: 1970   Date of Visit: 4/22/2019       To Whom it May Concern:    Marcelo Jo was seen in my clinic on 4/22/2019. Please excuse from work today. Patient can return on 04/23/2019. If you have any questions or concerns, please don't hesitate to call.     Sincerely,         Akila Baker, APRN - CNP

## 2019-04-26 ENCOUNTER — OFFICE VISIT (OUTPATIENT)
Dept: PRIMARY CARE CLINIC | Age: 49
End: 2019-04-26
Payer: COMMERCIAL

## 2019-04-26 VITALS
RESPIRATION RATE: 18 BRPM | HEIGHT: 65 IN | TEMPERATURE: 97.8 F | SYSTOLIC BLOOD PRESSURE: 140 MMHG | OXYGEN SATURATION: 98 % | BODY MASS INDEX: 23.49 KG/M2 | DIASTOLIC BLOOD PRESSURE: 94 MMHG | WEIGHT: 141 LBS | HEART RATE: 89 BPM

## 2019-04-26 DIAGNOSIS — J01.00 ACUTE NON-RECURRENT MAXILLARY SINUSITIS: Primary | ICD-10-CM

## 2019-04-26 DIAGNOSIS — Z51.81 THERAPEUTIC DRUG MONITORING: ICD-10-CM

## 2019-04-26 DIAGNOSIS — M79.7 FIBROMYALGIA: ICD-10-CM

## 2019-04-26 LAB
AMPHETAMINE SCREEN, URINE: NORMAL
BARBITURATE SCREEN, URINE: NORMAL
BENZODIAZEPINE SCREEN, URINE: NORMAL
BUPRENORPHINE URINE: NORMAL
COCAINE METABOLITE SCREEN URINE: NORMAL
GABAPENTIN SCREEN, URINE: NORMAL
MDMA URINE: NORMAL
METHADONE SCREEN, URINE: NORMAL
METHAMPHETAMINE, URINE: NORMAL
OPIATE SCREEN URINE: NORMAL
OXYCODONE SCREEN URINE: NORMAL
PHENCYCLIDINE SCREEN URINE: NORMAL
PROPOXYPHENE SCREEN, URINE: NORMAL
THC SCREEN, URINE: NORMAL
TRICYCLIC ANTIDEPRESSANTS, UR: NORMAL

## 2019-04-26 PROCEDURE — 96372 THER/PROPH/DIAG INJ SC/IM: CPT | Performed by: NURSE PRACTITIONER

## 2019-04-26 PROCEDURE — 80305 DRUG TEST PRSMV DIR OPT OBS: CPT | Performed by: NURSE PRACTITIONER

## 2019-04-26 PROCEDURE — 99213 OFFICE O/P EST LOW 20 MIN: CPT | Performed by: NURSE PRACTITIONER

## 2019-04-26 RX ORDER — AMOXICILLIN AND CLAVULANATE POTASSIUM 875; 125 MG/1; MG/1
1 TABLET, FILM COATED ORAL 2 TIMES DAILY
Qty: 14 TABLET | Refills: 0 | Status: SHIPPED | OUTPATIENT
Start: 2019-04-26 | End: 2019-05-03

## 2019-04-26 RX ORDER — BUSPIRONE HYDROCHLORIDE 5 MG/1
5 TABLET ORAL 3 TIMES DAILY PRN
Qty: 90 TABLET | Refills: 0 | Status: SHIPPED | OUTPATIENT
Start: 2019-04-26 | End: 2019-07-02 | Stop reason: SDUPTHER

## 2019-04-26 RX ORDER — GABAPENTIN 300 MG/1
300 CAPSULE ORAL 3 TIMES DAILY
Qty: 90 CAPSULE | Refills: 0 | Status: SHIPPED | OUTPATIENT
Start: 2019-04-26 | End: 2019-09-17 | Stop reason: SDUPTHER

## 2019-04-26 RX ORDER — METHYLPREDNISOLONE ACETATE 40 MG/ML
40 INJECTION, SUSPENSION INTRA-ARTICULAR; INTRALESIONAL; INTRAMUSCULAR; SOFT TISSUE ONCE
Status: COMPLETED | OUTPATIENT
Start: 2019-04-26 | End: 2019-04-26

## 2019-04-26 RX ADMIN — METHYLPREDNISOLONE ACETATE 40 MG: 40 INJECTION, SUSPENSION INTRA-ARTICULAR; INTRALESIONAL; INTRAMUSCULAR; SOFT TISSUE at 08:56

## 2019-04-26 ASSESSMENT — ENCOUNTER SYMPTOMS: COUGH: 1

## 2019-04-26 NOTE — LETTER
[address]  Phone: [phone]  Fax: [phone], APRN        April 26, 2019     Patient: Donell Chacon   YOB: 1970   Date of Visit: 4/26/2019       To Whom it May Concern:    Kota Crystal was seen in my clinic on 4/26/2019. If you have any questions or concerns, please don't hesitate to call.     Sincerely,         Yisel Todd, APRN

## 2019-04-26 NOTE — PROGRESS NOTES
[address] [address]     Phone:  [phone]  Fax:  [phone]      Dora Kilpatrick is a 54 y.o. female who presents today for her medical conditions/complaints as noted below. Dora Kilpatrick is c/o of Cough (Patient states that she is coughing up bloody mucus) and Chest Congestion      Chief Complaint   Patient presents with    Cough     Patient states that she is coughing up bloody mucus    Chest Congestion       HPI:     HPI    Dora Kilpatrick presents today for cough and chest congestion. This has been ongoing for one week. She does have hemoptysis starting today. She has taken Mucinex, dayquil, nyquil, medrol dosepak, tessalon perles, and Claritin. Her cough has only worsened since she went to the  on . Other symptoms include sinus pain/pressure, headache. She denies fever, nausea, vomiting, or diarrhea. She is also needing medication refills. Past Medical History:   Diagnosis Date    Asthma     Chronic back pain 2018    Chronic migraine without aura, intractable, without status migrainosus     Colon polyps     Depression 2018    Elevated blood pressure, situational 2018    Essential tremor     Fibromyalgia     Liver disease     Fatty Liver    Meniere disease 2017    Osteoarthritis     Tinnitus         Past Surgical History:   Procedure Laterality Date     SECTION      CHOLECYSTECTOMY, LAPAROSCOPIC  2015    Dr. Cordova Ek  ?    ?    HYSTERECTOMY      LIVER BIOPSY  2015    Dr. Baljit Barbabindgerardo History     Tobacco Use    Smoking status: Never Smoker    Smokeless tobacco: Never Used   Substance Use Topics    Alcohol use: No        Current Outpatient Medications   Medication Sig Dispense Refill    gabapentin (NEURONTIN) 300 MG capsule Take 1 capsule by mouth 3 times daily for 30 days.  90 capsule 0    busPIRone (BUSPAR) 5 MG tablet Take 1 tablet by mouth 3 times daily as needed (anxiety) 90 tablet 0    amoxicillin-clavulanate (AUGMENTIN) 875-125 MG per tablet Take 1 tablet by mouth 2 times daily for 7 days 14 tablet 0    dextromethorphan-guaiFENesin (MUCINEX DM)  MG per extended release tablet Take 1 tablet by mouth every 12 hours as needed for Cough or Congestion 60 tablet 0    loratadine (CLARITIN) 10 MG tablet Take 1 tablet by mouth daily 30 tablet 0    methylPREDNISolone (MEDROL, MAUREEN,) 4 MG tablet Take by mouth. 1 kit 0    benzonatate (TESSALON PERLES) 100 MG capsule Take 1 capsule by mouth 3 times daily as needed for Cough 21 capsule 0    vitamin E 1000 units capsule Take 1,000 Units by mouth daily      fluticasone (FLONASE) 50 MCG/ACT nasal spray 1 spray by Nasal route daily 1 Bottle 5    pantoprazole (PROTONIX) 40 MG tablet Take 1 tablet by mouth daily 30 tablet 5    amitriptyline (ELAVIL) 25 MG tablet Take 2 tablets by mouth nightly 60 tablet 5    desvenlafaxine succinate (PRISTIQ) 25 MG TB24 extended release tablet Take 1 tablet by mouth daily Take one tablet daily for 3 days then increase to two tablets daily.  60 tablet 5    ondansetron (ZOFRAN ODT) 4 MG disintegrating tablet Take 1 tablet by mouth every 8 hours as needed for Nausea or Vomiting 10 tablet 0     Current Facility-Administered Medications   Medication Dose Route Frequency Provider Last Rate Last Dose    methylPREDNISolone acetate (DEPO-MEDROL) injection 40 mg  40 mg Intramuscular Once Bud JUAN JOSE Elizondo           Allergies   Allergen Reactions    Latex Rash, Itching, Swelling and Hives    Codeine Anaphylaxis    Morphine Anaphylaxis    Morphine And Related Anaphylaxis       Family History   Problem Relation Age of Onset    Colon Polyps Paternal Grandfather     Liver Cancer Paternal Grandfather     Colon Polyps Mother     Liver Cancer Mother     Heart Disease Mother     Cancer Mother     Diabetes Mother     Colon Polyps Maternal Grandmother     Heart Disease Father     Cancer Father  Colon Cancer Neg Hx     Esophageal Cancer Neg Hx     Liver Disease Neg Hx     Rectal Cancer Neg Hx     Stomach Cancer Neg Hx                Review of Systems   Constitutional: Negative for fever. HENT: Positive for congestion, sinus pressure and sinus pain. Respiratory: Positive for cough. Negative for shortness of breath and wheezing. Cardiovascular: Negative. Gastrointestinal: Negative. Musculoskeletal: Positive for arthralgias. Neurological: Positive for headaches. Objective:     Physical Exam   Constitutional: She is oriented to person, place, and time. She appears well-developed and well-nourished. No distress. HENT:   Head: Normocephalic and atraumatic. Right Ear: External ear normal. A middle ear effusion is present. Left Ear: External ear normal. A middle ear effusion is present. Nose: Mucosal edema and rhinorrhea present. Right sinus exhibits maxillary sinus tenderness and frontal sinus tenderness. Left sinus exhibits maxillary sinus tenderness and frontal sinus tenderness. Mouth/Throat: Oropharynx is clear and moist.   Eyes: Pupils are equal, round, and reactive to light. Conjunctivae are normal. Right eye exhibits no discharge. Left eye exhibits no discharge. Neck: Normal range of motion. Neck supple. Cardiovascular: Normal rate, regular rhythm, normal heart sounds and intact distal pulses. No murmur heard. Pulmonary/Chest: Effort normal and breath sounds normal. No respiratory distress. She has no wheezes. Abdominal: Soft. Bowel sounds are normal. She exhibits no distension. There is no tenderness. Musculoskeletal: She exhibits tenderness (generalized). She exhibits no edema. Lumbar back: She exhibits normal range of motion. Lymphadenopathy:     She has no cervical adenopathy. Neurological: She is alert and oriented to person, place, and time. Skin: Skin is warm and dry. No rash noted. Psychiatric: She has a normal mood and affect.  Her behavior is normal. Judgment and thought content normal.   Nursing note and vitals reviewed. BP (!) 140/94   Pulse 89   Temp 97.8 °F (36.6 °C) (Temporal)   Resp 18   Ht 5' 5\" (1.651 m)   Wt 141 lb (64 kg)   SpO2 98%   BMI 23.46 kg/m²     Assessment:      Diagnosis Orders   1. Acute non-recurrent maxillary sinusitis  amoxicillin-clavulanate (AUGMENTIN) 875-125 MG per tablet    methylPREDNISolone acetate (DEPO-MEDROL) injection 40 mg    dextromethorphan-guaiFENesin (MUCINEX DM)  MG per extended release tablet   2. Fibromyalgia  gabapentin (NEURONTIN) 300 MG capsule   3. Therapeutic drug monitoring  POCT Rapid Drug Screen       No results found for this visit on 04/26/19. Plan:     Refill gabapentin and BuSpar. UDS obtained. Hesham Ramirez on chart. Depo-Medrol injection given in office without reaction. Augmentin for sinus infection. May continue Mucinex DM. Return if symptoms worsen or fail to improve. Orders Placed This Encounter   Procedures    POCT Rapid Drug Screen       Orders Placed This Encounter   Medications    gabapentin (NEURONTIN) 300 MG capsule     Sig: Take 1 capsule by mouth 3 times daily for 30 days. Dispense:  90 capsule     Refill:  0    busPIRone (BUSPAR) 5 MG tablet     Sig: Take 1 tablet by mouth 3 times daily as needed (anxiety)     Dispense:  90 tablet     Refill:  0    amoxicillin-clavulanate (AUGMENTIN) 875-125 MG per tablet     Sig: Take 1 tablet by mouth 2 times daily for 7 days     Dispense:  14 tablet     Refill:  0    methylPREDNISolone acetate (DEPO-MEDROL) injection 40 mg    dextromethorphan-guaiFENesin (MUCINEX DM)  MG per extended release tablet     Sig: Take 1 tablet by mouth every 12 hours as needed for Cough or Congestion     Dispense:  60 tablet     Refill:  0        Patient offered educational materials - see patient instructions for any instruction needed. Discussed use, benefit, and side effects of prescribed medications.   All

## 2019-04-28 ASSESSMENT — ENCOUNTER SYMPTOMS
SINUS PRESSURE: 1
SHORTNESS OF BREATH: 0
GASTROINTESTINAL NEGATIVE: 1
WHEEZING: 0
SINUS PAIN: 1

## 2019-07-02 DIAGNOSIS — G89.29 OTHER CHRONIC PAIN: ICD-10-CM

## 2019-07-02 DIAGNOSIS — G47.00 INSOMNIA, UNSPECIFIED TYPE: ICD-10-CM

## 2019-07-02 DIAGNOSIS — G89.29 CHRONIC BACK PAIN, UNSPECIFIED BACK LOCATION, UNSPECIFIED BACK PAIN LATERALITY: ICD-10-CM

## 2019-07-02 DIAGNOSIS — M54.9 CHRONIC BACK PAIN, UNSPECIFIED BACK LOCATION, UNSPECIFIED BACK PAIN LATERALITY: ICD-10-CM

## 2019-07-02 RX ORDER — BUSPIRONE HYDROCHLORIDE 5 MG/1
5 TABLET ORAL 3 TIMES DAILY PRN
Qty: 90 TABLET | Refills: 0 | Status: SHIPPED | OUTPATIENT
Start: 2019-07-02 | End: 2019-08-16

## 2019-07-02 RX ORDER — AMITRIPTYLINE HYDROCHLORIDE 25 MG/1
50 TABLET, FILM COATED ORAL NIGHTLY
Qty: 60 TABLET | Refills: 5 | Status: SHIPPED | OUTPATIENT
Start: 2019-07-02 | End: 2019-09-17 | Stop reason: SDUPTHER

## 2019-07-02 NOTE — TELEPHONE ENCOUNTER
Kenya Rubyelena called to request a refill on her medication. Last office visit : 4/26/2019   Next office visit : Visit date not found     Requested Prescriptions     Pending Prescriptions Disp Refills    busPIRone (BUSPAR) 5 MG tablet 90 tablet 0     Sig: Take 1 tablet by mouth 3 times daily as needed (anxiety)    amitriptyline (ELAVIL) 25 MG tablet 60 tablet 5     Sig: Take 2 tablets by mouth nightly            Olamide Ayala MA     Medication sent into the pharmacy.

## 2019-08-16 ENCOUNTER — HOSPITAL ENCOUNTER (OUTPATIENT)
Dept: GENERAL RADIOLOGY | Age: 49
Discharge: HOME | End: 2019-08-16
Payer: COMMERCIAL

## 2019-08-16 ENCOUNTER — OFFICE VISIT (OUTPATIENT)
Dept: URGENT CARE | Age: 49
End: 2019-08-16
Payer: COMMERCIAL

## 2019-08-16 VITALS
SYSTOLIC BLOOD PRESSURE: 122 MMHG | WEIGHT: 139 LBS | HEART RATE: 118 BPM | BODY MASS INDEX: 23.13 KG/M2 | OXYGEN SATURATION: 94 % | RESPIRATION RATE: 18 BRPM | TEMPERATURE: 98.6 F | DIASTOLIC BLOOD PRESSURE: 70 MMHG

## 2019-08-16 DIAGNOSIS — M65.4 TENOSYNOVITIS, DE QUERVAIN: Primary | ICD-10-CM

## 2019-08-16 DIAGNOSIS — M25.531 RIGHT WRIST PAIN: ICD-10-CM

## 2019-08-16 PROCEDURE — 96372 THER/PROPH/DIAG INJ SC/IM: CPT | Performed by: SPECIALIST

## 2019-08-16 PROCEDURE — 73100 X-RAY EXAM OF WRIST: CPT

## 2019-08-16 PROCEDURE — 99213 OFFICE O/P EST LOW 20 MIN: CPT | Performed by: SPECIALIST

## 2019-08-16 RX ORDER — METHYLPREDNISOLONE 4 MG/1
TABLET ORAL
Qty: 1 KIT | Refills: 0 | Status: SHIPPED | OUTPATIENT
Start: 2019-08-16 | End: 2019-12-26

## 2019-08-16 RX ORDER — TRIAMCINOLONE ACETONIDE 40 MG/ML
40 INJECTION, SUSPENSION INTRA-ARTICULAR; INTRAMUSCULAR ONCE
Status: COMPLETED | OUTPATIENT
Start: 2019-08-16 | End: 2019-08-16

## 2019-08-16 RX ADMIN — TRIAMCINOLONE ACETONIDE 40 MG: 40 INJECTION, SUSPENSION INTRA-ARTICULAR; INTRAMUSCULAR at 12:37

## 2019-08-16 NOTE — PROGRESS NOTES
[facility]  [address] [place] [address] [address]  [address] [address]  Dept: [phone]  Loc: [phone]    Rasheeda Lewis is a 54 y.o. female who presents today for her medical conditions/complaintsas noted below. Rasheeda Lewis is c/o of Wrist Pain (right)        HPI:     Wrist Pain    The pain is present in the right wrist and right hand. This is a new problem. The current episode started 1 to 4 weeks ago. The problem has been gradually worsening. The quality of the pain is described as sharp. The pain is moderate. Associated symptoms include a limited range of motion (due to pain). Pertinent negatives include no joint locking, joint swelling, numbness, stiffness or tingling. The symptoms are aggravated by activity. She has tried nothing for the symptoms.        Past Medical History:   Diagnosis Date    Asthma     Chronic back pain 2018    Chronic migraine without aura, intractable, without status migrainosus     Colon polyps     Depression 2018    Elevated blood pressure, situational 2018    Essential tremor     Fibromyalgia     Liver disease     Fatty Liver    Meniere disease 2017    Osteoarthritis     Tinnitus      Past Surgical History:   Procedure Laterality Date     SECTION      CHOLECYSTECTOMY, LAPAROSCOPIC  2015    Dr. Mary Gould  ?    ?    HYSTERECTOMY      LIVER BIOPSY  2015    Dr. Tona Hernandez       Family History   Problem Relation Age of Onset    Colon Polyps Paternal Grandfather     Liver Cancer Paternal Grandfather     Colon Polyps Mother     Liver Cancer Mother     Heart Disease Mother     Cancer Mother     Diabetes Mother     Colon Polyps Maternal Grandmother     Heart Disease Father     Cancer Father     Colon Cancer Neg Hx     Esophageal Cancer Neg Hx     Liver Disease Neg Hx     Rectal Cancer Neg Hx     Stomach Cancer Neg Hx        Social History     Tobacco Use    Smoking Nursing note and vitals reviewed. /70   Pulse 118   Temp 98.6 °F (37 °C)   Resp 18   Wt 139 lb (63 kg)   SpO2 94%   BMI 23.13 kg/m²     ASSESSMENT:       Diagnosis Orders   1. Tenosynovitis, de Quervain  methylPREDNISolone (MEDROL DOSEPACK) 4 MG tablet    triamcinolone acetonide (KENALOG-40) injection 40 mg   2. Right wrist pain  XR WRIST RIGHT (2 VIEWS)       PLAN:      Orders Placed This Encounter   Procedures    XR WRIST RIGHT (2 VIEWS)     Standing Status:   Future     Number of Occurrences:   1     Standing Expiration Date:   8/16/2020     Order Specific Question:   Reason for exam:     Answer:   right wrist pain       Return if symptoms worsen or fail to improve. Orders Placed This Encounter   Procedures    XR WRIST RIGHT (2 VIEWS)     Standing Status:   Future     Number of Occurrences:   1     Standing Expiration Date:   8/16/2020     Order Specific Question:   Reason for exam:     Answer:   right wrist pain     Orders Placed This Encounter   Medications    methylPREDNISolone (MEDROL DOSEPACK) 4 MG tablet     Sig: Take by mouth. Dispense:  1 kit     Refill:  0    triamcinolone acetonide (KENALOG-40) injection 40 mg       Patient given educationalmaterials - see patient instructions. Discussed use, benefit, and side effects of prescribed medications. All patient questions answered. Pt voiced understanding. Patient agreed with treatment plan. Follow up as directed. Patient Instructions       Patient Education        Ramila Holden Tenosynovitis: Care Instructions  Your Care Instructions  Ramila Holden (say \"[id number]-TZA\") tenosynovitis is a problem that makes the bottom of your thumb and the side of your wrist hurt. When you have de Quervain's, the ropey fiber (tendon) that helps move your thumb away from your fingers becomes swollen. You may have pain when you move your wrist or pick things up. You may hear a creaking sound when you move your wrist or thumb.   Symptoms often

## 2019-09-17 ENCOUNTER — HOSPITAL ENCOUNTER (OUTPATIENT)
Dept: GENERAL RADIOLOGY | Age: 49
Discharge: HOME | End: 2019-09-17
Payer: COMMERCIAL

## 2019-09-17 ENCOUNTER — TELEPHONE (OUTPATIENT)
Dept: PRIMARY CARE CLINIC | Age: 49
End: 2019-09-17

## 2019-09-17 ENCOUNTER — OFFICE VISIT (OUTPATIENT)
Dept: PRIMARY CARE CLINIC | Age: 49
End: 2019-09-17
Payer: COMMERCIAL

## 2019-09-17 VITALS
DIASTOLIC BLOOD PRESSURE: 82 MMHG | OXYGEN SATURATION: 99 % | BODY MASS INDEX: 23.46 KG/M2 | HEART RATE: 78 BPM | SYSTOLIC BLOOD PRESSURE: 128 MMHG | WEIGHT: 140.8 LBS | TEMPERATURE: 97.7 F | HEIGHT: 65 IN

## 2019-09-17 DIAGNOSIS — M65.4 DE QUERVAIN'S DISEASE (RADIAL STYLOID TENOSYNOVITIS): Primary | ICD-10-CM

## 2019-09-17 DIAGNOSIS — M79.671 RIGHT FOOT PAIN: ICD-10-CM

## 2019-09-17 DIAGNOSIS — M21.611 BUNION, RIGHT FOOT: ICD-10-CM

## 2019-09-17 DIAGNOSIS — Z13.220 SCREENING FOR HYPERLIPIDEMIA: ICD-10-CM

## 2019-09-17 DIAGNOSIS — G89.29 OTHER CHRONIC PAIN: ICD-10-CM

## 2019-09-17 DIAGNOSIS — M79.7 FIBROMYALGIA: ICD-10-CM

## 2019-09-17 DIAGNOSIS — K75.81 NASH (NONALCOHOLIC STEATOHEPATITIS): ICD-10-CM

## 2019-09-17 PROCEDURE — 73630 X-RAY EXAM OF FOOT: CPT

## 2019-09-17 PROCEDURE — 99214 OFFICE O/P EST MOD 30 MIN: CPT | Performed by: NURSE PRACTITIONER

## 2019-09-17 PROCEDURE — 20605 DRAIN/INJ JOINT/BURSA W/O US: CPT | Performed by: NURSE PRACTITIONER

## 2019-09-17 RX ORDER — BUSPIRONE HYDROCHLORIDE 5 MG/1
5 TABLET ORAL DAILY
COMMUNITY
End: 2019-09-17 | Stop reason: SDUPTHER

## 2019-09-17 RX ORDER — TRIAMCINOLONE ACETONIDE 40 MG/ML
40 INJECTION, SUSPENSION INTRA-ARTICULAR; INTRAMUSCULAR ONCE
Qty: 1 ML | Refills: 0 | Status: SHIPPED | OUTPATIENT
Start: 2019-09-17 | End: 2019-09-17

## 2019-09-17 RX ORDER — BUSPIRONE HYDROCHLORIDE 5 MG/1
5 TABLET ORAL DAILY
Qty: 30 TABLET | Refills: 2 | Status: SHIPPED | OUTPATIENT
Start: 2019-09-17 | End: 2020-03-19

## 2019-09-17 RX ORDER — AMITRIPTYLINE HYDROCHLORIDE 25 MG/1
50 TABLET, FILM COATED ORAL NIGHTLY
Qty: 60 TABLET | Refills: 5 | Status: SHIPPED | OUTPATIENT
Start: 2019-09-17 | End: 2020-06-04 | Stop reason: SDUPTHER

## 2019-09-17 RX ORDER — FLUTICASONE PROPIONATE 50 MCG
1 SPRAY, SUSPENSION (ML) NASAL DAILY
Qty: 1 BOTTLE | Refills: 5 | Status: SHIPPED | OUTPATIENT
Start: 2019-09-17 | End: 2021-11-09 | Stop reason: SDUPTHER

## 2019-09-17 RX ORDER — DESVENLAFAXINE 50 MG/1
TABLET, EXTENDED RELEASE ORAL
Qty: 30 TABLET | Refills: 3 | Status: SHIPPED | OUTPATIENT
Start: 2019-09-17 | End: 2021-01-08 | Stop reason: SDUPTHER

## 2019-09-17 RX ORDER — TRIAMCINOLONE ACETONIDE 40 MG/ML
40 INJECTION, SUSPENSION INTRA-ARTICULAR; INTRAMUSCULAR ONCE
Status: COMPLETED | OUTPATIENT
Start: 2019-09-17 | End: 2019-09-17

## 2019-09-17 RX ORDER — GABAPENTIN 300 MG/1
300 CAPSULE ORAL 3 TIMES DAILY
Qty: 90 CAPSULE | Refills: 0 | Status: SHIPPED | OUTPATIENT
Start: 2019-09-17 | End: 2020-01-17

## 2019-09-17 RX ADMIN — TRIAMCINOLONE ACETONIDE 40 MG: 40 INJECTION, SUSPENSION INTRA-ARTICULAR; INTRAMUSCULAR at 11:46

## 2019-09-17 ASSESSMENT — ENCOUNTER SYMPTOMS
COLOR CHANGE: 1
STRIDOR: 0
WHEEZING: 0
GASTROINTESTINAL NEGATIVE: 1
COUGH: 0

## 2019-09-17 NOTE — PROGRESS NOTES
tobacco: Never Used   Substance Use Topics    Alcohol use: No        Current Outpatient Medications   Medication Sig Dispense Refill    gabapentin (NEURONTIN) 300 MG capsule Take 1 capsule by mouth 3 times daily for 30 days. 90 capsule 0    fluticasone (FLONASE) 50 MCG/ACT nasal spray 1 spray by Nasal route daily 1 Bottle 5    desvenlafaxine succinate (PRISTIQ) 50 MG TB24 extended release tablet TAKE 1 TABLET BY MOUTH ONCE DAILY 30 tablet 3    amitriptyline (ELAVIL) 25 MG tablet Take 2 tablets by mouth nightly 60 tablet 5    busPIRone (BUSPAR) 5 MG tablet Take 1 tablet by mouth daily 30 tablet 2    vitamin E 1000 units capsule Take 1,000 Units by mouth daily      methylPREDNISolone (MEDROL DOSEPACK) 4 MG tablet Take by mouth. (Patient not taking: Reported on 9/17/2019) 1 kit 0     Current Facility-Administered Medications   Medication Dose Route Frequency Provider Last Rate Last Dose    triamcinolone acetonide (KENALOG-40) injection 40 mg  40 mg Intramuscular Once [employer], APRN           Allergies   Allergen Reactions    Latex Rash, Itching, Swelling and Hives    Codeine Anaphylaxis    Morphine Anaphylaxis    Morphine And Related Anaphylaxis    Eggs Or Egg-Derived Products        Family History   Problem Relation Age of Onset    Colon Polyps Paternal Grandfather     Liver Cancer Paternal Grandfather     Colon Polyps Mother     Liver Cancer Mother     Heart Disease Mother     Cancer Mother     Diabetes Mother     Colon Polyps Maternal Grandmother     Heart Disease Father     Cancer Father     Colon Cancer Neg Hx     Esophageal Cancer Neg Hx     Liver Disease Neg Hx     Rectal Cancer Neg Hx     Stomach Cancer Neg Hx                Review of Systems   Constitutional: Negative for fatigue and fever. HENT: Negative. Respiratory: Negative for cough, wheezing and stridor. Cardiovascular: Negative for chest pain, palpitations and leg swelling. Gastrointestinal: Negative. Future     Standing Expiration Date:   2020    CBC     Standing Status:   Future     Standing Expiration Date:   2020    Lipid, Fasting     Standing Status:   Future     Standing Expiration Date:   2020       Orders Placed This Encounter   Medications    gabapentin (NEURONTIN) 300 MG capsule     Sig: Take 1 capsule by mouth 3 times daily for 30 days. Dispense:  90 capsule     Refill:  0    fluticasone (FLONASE) 50 MCG/ACT nasal spray     Si spray by Nasal route daily     Dispense:  1 Bottle     Refill:  5    desvenlafaxine succinate (PRISTIQ) 50 MG TB24 extended release tablet     Sig: TAKE 1 TABLET BY MOUTH ONCE DAILY     Dispense:  30 tablet     Refill:  3     NO FURTHER REFILLS UNTIL SEEN IN OFFICE.  amitriptyline (ELAVIL) 25 MG tablet     Sig: Take 2 tablets by mouth nightly     Dispense:  60 tablet     Refill:  5    busPIRone (BUSPAR) 5 MG tablet     Sig: Take 1 tablet by mouth daily     Dispense:  30 tablet     Refill:  2    DISCONTD: triamcinolone acetonide (KENALOG) injection 5 mg    DISCONTD: triamcinolone acetonide (KENALOG-40) 40 MG/ML injection     Sig: Inject 1 mL into the muscle once for 1 dose     Dispense:  1 mL     Refill:  0    triamcinolone acetonide (KENALOG-40) injection 40 mg        Patient offered educational materials - see patient instructions for any instruction needed. Discussed use, benefit, and side effects of prescribed medications. All patient questions answered. Instructed to continue current medications, diet and exercise. Patient agreed with treatment plan. Follow up as directed. Patient was advised to go to the ED if condition ever becomes emergent. EMR Dragon/transcription disclaimer: Some of this encounter note is an electronic transcription/translation of spoken language to printed text. The electronic translation of spoken language may permit erroneous, or at times, nonsensical words or phrases to be inadvertently transcribed.  Although

## 2019-12-26 ENCOUNTER — OFFICE VISIT (OUTPATIENT)
Dept: PRIMARY CARE CLINIC | Age: 49
End: 2019-12-26
Payer: COMMERCIAL

## 2019-12-26 VITALS
TEMPERATURE: 98.6 F | WEIGHT: 144 LBS | HEART RATE: 85 BPM | OXYGEN SATURATION: 97 % | HEIGHT: 65 IN | DIASTOLIC BLOOD PRESSURE: 88 MMHG | BODY MASS INDEX: 23.99 KG/M2 | SYSTOLIC BLOOD PRESSURE: 120 MMHG

## 2019-12-26 DIAGNOSIS — J01.10 ACUTE NON-RECURRENT FRONTAL SINUSITIS: Primary | ICD-10-CM

## 2019-12-26 DIAGNOSIS — M54.41 CHRONIC BILATERAL LOW BACK PAIN WITH BILATERAL SCIATICA: ICD-10-CM

## 2019-12-26 DIAGNOSIS — M54.42 CHRONIC BILATERAL LOW BACK PAIN WITH BILATERAL SCIATICA: ICD-10-CM

## 2019-12-26 DIAGNOSIS — G89.29 CHRONIC BILATERAL LOW BACK PAIN WITH BILATERAL SCIATICA: ICD-10-CM

## 2019-12-26 PROCEDURE — 99214 OFFICE O/P EST MOD 30 MIN: CPT | Performed by: NURSE PRACTITIONER

## 2019-12-26 PROCEDURE — 96372 THER/PROPH/DIAG INJ SC/IM: CPT | Performed by: NURSE PRACTITIONER

## 2019-12-26 RX ORDER — METHYLPREDNISOLONE ACETATE 40 MG/ML
40 INJECTION, SUSPENSION INTRA-ARTICULAR; INTRALESIONAL; INTRAMUSCULAR; SOFT TISSUE ONCE
Status: COMPLETED | OUTPATIENT
Start: 2019-12-26 | End: 2019-12-26

## 2019-12-26 RX ORDER — AMOXICILLIN AND CLAVULANATE POTASSIUM 875; 125 MG/1; MG/1
1 TABLET, FILM COATED ORAL 2 TIMES DAILY
Qty: 20 TABLET | Refills: 0 | Status: SHIPPED | OUTPATIENT
Start: 2019-12-26 | End: 2020-01-05

## 2019-12-26 RX ADMIN — METHYLPREDNISOLONE ACETATE 40 MG: 40 INJECTION, SUSPENSION INTRA-ARTICULAR; INTRALESIONAL; INTRAMUSCULAR; SOFT TISSUE at 14:20

## 2019-12-26 ASSESSMENT — ENCOUNTER SYMPTOMS
ABDOMINAL PAIN: 0
VOICE CHANGE: 1
NAUSEA: 0
SINUS PAIN: 1
BACK PAIN: 1
SORE THROAT: 1
EYE PAIN: 0
SINUS PRESSURE: 1
VOMITING: 0
COUGH: 0
SHORTNESS OF BREATH: 0
DIARRHEA: 0
WHEEZING: 0

## 2020-01-15 NOTE — TELEPHONE ENCOUNTER
Cathleen Chaudhry called to request a refill on her medication. Last office visit : 12/26/2019   Next office visit : 1/28/2020     Last UDS:   Amphetamine Screen, Urine   Date Value Ref Range Status   04/26/2019 NEG  Final     Barbiturate Screen, Urine   Date Value Ref Range Status   04/26/2019 NEG  Final     Benzodiazepine Screen, Urine   Date Value Ref Range Status   04/26/2019 NEG  Final     Buprenorphine Urine   Date Value Ref Range Status   04/26/2019 NEG  Final     Cocaine Metabolite Screen, Urine   Date Value Ref Range Status   04/26/2019 NEG  Final     Gabapentin Screen, Urine   Date Value Ref Range Status   04/26/2019 NEG  Final     MDMA, Urine   Date Value Ref Range Status   04/26/2019 NEG  Final     Methamphetamine, Urine   Date Value Ref Range Status   04/26/2019 NEG  Final     Opiate Scrn, Ur   Date Value Ref Range Status   04/26/2019 NEG  Final     Oxycodone Screen, Ur   Date Value Ref Range Status   04/26/2019 NEG  Final     PCP Screen, Urine   Date Value Ref Range Status   04/26/2019 NEG  Final     Propoxyphene Screen, Urine   Date Value Ref Range Status   04/26/2019 NEG  Final     THC Screen, Urine   Date Value Ref Range Status   04/26/2019 NEG  Final     Tricyclic Antidepressants, Urine   Date Value Ref Range Status   04/26/2019 NEG  Final       Last [place]: 2016-new one pending  Medication Contract: 5-2019   Last Fill: 9-17    Requested Prescriptions     Pending Prescriptions Disp Refills    gabapentin (NEURONTIN) 300 MG capsule [Pharmacy Med Name: GABAPENTIN 300 MG CAPSULE] 90 capsule 0     Sig: TAKE 1 CAPSULE BY MOUTH THREE TIMES DAILY. Please approve or refuse this medication.    Elena Espinoza LPN

## 2020-01-21 RX ORDER — GABAPENTIN 300 MG/1
CAPSULE ORAL
Qty: 90 CAPSULE | Refills: 0 | Status: SHIPPED | OUTPATIENT
Start: 2020-01-21 | End: 2020-03-10

## 2020-03-10 RX ORDER — GABAPENTIN 300 MG/1
CAPSULE ORAL
Qty: 90 CAPSULE | Refills: 0 | Status: SHIPPED | OUTPATIENT
Start: 2020-03-10 | End: 2020-06-04 | Stop reason: SDUPTHER

## 2020-03-10 NOTE — TELEPHONE ENCOUNTER
Nikolai Cooper called to request a refill on her medication. Last office visit : 12/26/2019   Next office visit : Visit date not found     Last UDS:   Amphetamine Screen, Urine   Date Value Ref Range Status   04/26/2019 NEG  Final     Barbiturate Screen, Urine   Date Value Ref Range Status   04/26/2019 NEG  Final     Benzodiazepine Screen, Urine   Date Value Ref Range Status   04/26/2019 NEG  Final     Buprenorphine Urine   Date Value Ref Range Status   04/26/2019 NEG  Final     Cocaine Metabolite Screen, Urine   Date Value Ref Range Status   04/26/2019 NEG  Final     Gabapentin Screen, Urine   Date Value Ref Range Status   04/26/2019 NEG  Final     MDMA, Urine   Date Value Ref Range Status   04/26/2019 NEG  Final     Methamphetamine, Urine   Date Value Ref Range Status   04/26/2019 NEG  Final     Opiate Scrn, Ur   Date Value Ref Range Status   04/26/2019 NEG  Final     Oxycodone Screen, Ur   Date Value Ref Range Status   04/26/2019 NEG  Final     PCP Screen, Urine   Date Value Ref Range Status   04/26/2019 NEG  Final     Propoxyphene Screen, Urine   Date Value Ref Range Status   04/26/2019 NEG  Final     THC Screen, Urine   Date Value Ref Range Status   04/26/2019 NEG  Final     Tricyclic Antidepressants, Urine   Date Value Ref Range Status   04/26/2019 NEG  Final       Last [place]: 1-22  Medication Contract: 5-2019   Last Fill: 1-21    Requested Prescriptions     Pending Prescriptions Disp Refills    gabapentin (NEURONTIN) 300 MG capsule [Pharmacy Med Name: GABAPENTIN 300 MG CAPSULE] 90 capsule 0     Sig: TAKE 1 CAPSULE BY MOUTH THREE TIMES DAILY. Please approve or refuse this medication.    Celeste Bobo LPN

## 2020-06-04 ENCOUNTER — OFFICE VISIT (OUTPATIENT)
Dept: PRIMARY CARE CLINIC | Age: 50
End: 2020-06-04
Payer: COMMERCIAL

## 2020-06-04 VITALS
WEIGHT: 142 LBS | OXYGEN SATURATION: 98 % | SYSTOLIC BLOOD PRESSURE: 118 MMHG | TEMPERATURE: 98.7 F | HEIGHT: 65 IN | BODY MASS INDEX: 23.66 KG/M2 | HEART RATE: 86 BPM | DIASTOLIC BLOOD PRESSURE: 68 MMHG

## 2020-06-04 PROBLEM — F41.9 ANXIETY: Status: ACTIVE | Noted: 2020-06-04

## 2020-06-04 PROCEDURE — 99213 OFFICE O/P EST LOW 20 MIN: CPT | Performed by: NURSE PRACTITIONER

## 2020-06-04 PROCEDURE — 20610 DRAIN/INJ JOINT/BURSA W/O US: CPT | Performed by: NURSE PRACTITIONER

## 2020-06-04 RX ORDER — CYCLOBENZAPRINE HCL 10 MG
10 TABLET ORAL 3 TIMES DAILY PRN
Qty: 30 TABLET | Refills: 1 | Status: SHIPPED | OUTPATIENT
Start: 2020-06-04 | End: 2020-06-14

## 2020-06-04 RX ORDER — TIZANIDINE 4 MG/1
4 TABLET ORAL NIGHTLY PRN
Qty: 30 TABLET | Refills: 0 | Status: SHIPPED | OUTPATIENT
Start: 2020-06-04 | End: 2020-07-04

## 2020-06-04 RX ORDER — GABAPENTIN 300 MG/1
CAPSULE ORAL
Qty: 90 CAPSULE | Refills: 2 | Status: SHIPPED | OUTPATIENT
Start: 2020-06-04 | End: 2020-12-23 | Stop reason: SDUPTHER

## 2020-06-04 RX ORDER — AMITRIPTYLINE HYDROCHLORIDE 25 MG/1
50 TABLET, FILM COATED ORAL NIGHTLY
Qty: 60 TABLET | Refills: 5 | Status: SHIPPED | OUTPATIENT
Start: 2020-06-04 | End: 2021-01-08 | Stop reason: SDUPTHER

## 2020-06-04 RX ORDER — BUSPIRONE HYDROCHLORIDE 5 MG/1
TABLET ORAL
Qty: 90 TABLET | Refills: 2 | Status: SHIPPED | OUTPATIENT
Start: 2020-06-04 | End: 2021-01-08 | Stop reason: SDUPTHER

## 2020-06-04 NOTE — PROGRESS NOTES
 Smoking status: Never Smoker    Smokeless tobacco: Never Used   Substance and Sexual Activity    Alcohol use: No    Drug use: No    Sexual activity: None   Lifestyle    Physical activity     Days per week: None     Minutes per session: None    Stress: None   Relationships    Social connections     Talks on phone: None     Gets together: None     Attends Judaism service: None     Active member of club or organization: None     Attends meetings of clubs or organizations: None     Relationship status: None    Intimate partner violence     Fear of current or ex partner: None     Emotionally abused: None     Physically abused: None     Forced sexual activity: None   Other Topics Concern    None   Social History Narrative    None       Family History   Problem Relation Age of Onset    Colon Polyps Paternal Grandfather     Liver Cancer Paternal Grandfather     Colon Polyps Mother     Liver Cancer Mother     Heart Disease Mother     Cancer Mother     Diabetes Mother     Colon Polyps Maternal Grandmother     Heart Disease Father     Cancer Father     Colon Cancer Neg Hx     Esophageal Cancer Neg Hx     Liver Disease Neg Hx     Rectal Cancer Neg Hx     Stomach Cancer Neg Hx        Current Outpatient Medications   Medication Sig Dispense Refill    gabapentin (NEURONTIN) 300 MG capsule TAKE ONE TABLET BY MOUTH AT BED TIME 90 capsule 2    busPIRone (BUSPAR) 5 MG tablet TAKE 1 TABLET THREE TIMES DAILY AS NEEDED FOR ANXIETY 90 tablet 2    amitriptyline (ELAVIL) 25 MG tablet Take 2 tablets by mouth nightly 60 tablet 5    tiZANidine (ZANAFLEX) 4 MG tablet Take 1 tablet by mouth nightly as needed (TAKE 1/2 - 1 AT BEDTIME.) 30 tablet 0    fluticasone (FLONASE) 50 MCG/ACT nasal spray 1 spray by Nasal route daily 1 Bottle 5    desvenlafaxine succinate (PRISTIQ) 50 MG TB24 extended release tablet TAKE 1 TABLET BY MOUTH ONCE DAILY 30 tablet 3    vitamin E 1000 units capsule Take 1,000 Units by mouth is not in acute distress. Appearance: She is well-developed. She is not diaphoretic. HENT:      Head: Normocephalic and atraumatic. Right Ear: External ear normal.      Left Ear: External ear normal.      Nose: Nose normal.      Mouth/Throat:      Pharynx: No oropharyngeal exudate. Eyes:      General:         Right eye: No discharge. Left eye: No discharge. Conjunctiva/sclera: Conjunctivae normal.      Pupils: Pupils are equal, round, and reactive to light. Neck:      Musculoskeletal: Normal range of motion and neck supple. Cardiovascular:      Rate and Rhythm: Normal rate and regular rhythm. Heart sounds: Normal heart sounds. No murmur. Pulmonary:      Effort: Pulmonary effort is normal. No respiratory distress. Breath sounds: Normal breath sounds. No stridor. No wheezing or rales. Chest:      Chest wall: No tenderness. Breasts:         Right: No inverted nipple, mass, nipple discharge, skin change or tenderness. Left: No inverted nipple, mass, nipple discharge, skin change or tenderness. Abdominal:      General: Bowel sounds are normal. There is no distension. Palpations: Abdomen is soft. Tenderness: There is no abdominal tenderness. Musculoskeletal: Normal range of motion. General: No tenderness or deformity. Back:    Skin:     General: Skin is warm and dry. Findings: No erythema or rash. Neurological:      Mental Status: She is alert and oriented to person, place, and time. Cranial Nerves: No cranial nerve deficit. Coordination: Coordination normal.      Deep Tendon Reflexes: Reflexes are normal and symmetric. Psychiatric:         Behavior: Behavior normal.         Thought Content:  Thought content normal.       /68   Pulse 86   Temp 98.7 °F (37.1 °C) (Temporal)   Ht 5' 5\" (1.651 m)   Wt 142 lb (64.4 kg)   SpO2 98%   BMI 23.63 kg/m²     SI JOINT INJECTION  Patient was given verbal informed consent and agreed verbally and with signed consent to the risks and benefits of procedure. Risks discussed included bleeding, infection, damage to structures, and potentially other yet unlikely unforeseen consequences of those risks. An impression was made with a pen for injection site right sacroilac joint. The area was cleaned w/ betadine and alcohol swab. It was then sprayed w/ ethyl chloride and injected w/ a 25 gauge 1.5\" needle into the the tissue between the sacrum and ilium  It was aspirated and no bloody return into syringe. The medicine was administered w/o issue. 3.0 cc of 1% lidocaine w/o epinephrine, 3.0cc of marcaine, 4mg of dexamethazone, and 40mg of depomedrol  was administered. A bandage was applied directly thereafter. All bleeding stopped. EBL - 0 cc. Pt was instructed on basic cleansing and rest of affected area. Pt noted some relief prior to leaving the office. Assessment:      Diagnosis Orders   1. Anxiety  busPIRone (BUSPAR) 5 MG tablet   2. Fibromyalgia  gabapentin (NEURONTIN) 300 MG capsule   3. Other chronic pain  amitriptyline (ELAVIL) 25 MG tablet    cyclobenzaprine (FLEXERIL) 10 MG tablet    tiZANidine (ZANAFLEX) 4 MG tablet   4. Sciatica of right side  dexamethasone (DECADRON) injection 4 mg    methylPREDNISolone acetate (DEPO-MEDROL) injection 40 mg    bupivacaine (MARCAINE) 0.5 % injection 15 mg    lidocaine 1 % injection 3 mL     No results found for this visit on 06/04/20. Plan:     1. Anxiety    2. Fibromyalgia    3. Other chronic pain    4. Sciatica of right side      Return in about 3 months (around 9/4/2020), or if symptoms worsen or fail to improve. No orders of the defined types were placed in this encounter.     Orders Placed This Encounter   Medications    gabapentin (NEURONTIN) 300 MG capsule     Sig: TAKE ONE TABLET BY MOUTH AT BED TIME     Dispense:  90 capsule     Refill:  2    busPIRone (BUSPAR) 5 MG tablet     Sig: TAKE 1 TABLET THREE TIMES DAILY AS NEEDED FOR ANXIETY     Dispense:  90 tablet     Refill:  2    amitriptyline (ELAVIL) 25 MG tablet     Sig: Take 2 tablets by mouth nightly     Dispense:  60 tablet     Refill:  5    cyclobenzaprine (FLEXERIL) 10 MG tablet     Sig: Take 1 tablet by mouth 3 times daily as needed for Muscle spasms     Dispense:  30 tablet     Refill:  1    tiZANidine (ZANAFLEX) 4 MG tablet     Sig: Take 1 tablet by mouth nightly as needed (TAKE 1/2 - 1 AT BEDTIME.)     Dispense:  30 tablet     Refill:  0    dexamethasone (DECADRON) injection 4 mg    methylPREDNISolone acetate (DEPO-MEDROL) injection 40 mg    bupivacaine (MARCAINE) 0.5 % injection 15 mg    lidocaine 1 % injection 3 mL       Patient Instructions   Take flexeril 10mg 1/2 - 1 tablet three times daily as needed    Take zanaflex 4mg 1/2 - 1 full at bedtime. Don't take flexeril and zanaflex together.           Mushtaq Hicks given educational materials - see patient instructions. Discussed use, benefit, and side effects of prescribed medications. All patient/family questions answered and voiced understanding. Instructed to continue current medications, diet and exercise. Pt/family agreed with treatment plan. Follow up as directed and sooner if needed. Patient/ family instructed that is symptoms worsen or persist they are to contact office or report to nearest ER. They voice understanding and agreement with this plan.   signed by JUAN JOSE Wilhelm on 6/24/2020 at 11:07 AM CDT    This dictation was generated by voice recognition computer software. Although all attempts are made to edit the dictation for accuracy, there may be errors in the transcription that are not intended.

## 2020-06-04 NOTE — PATIENT INSTRUCTIONS
Take flexeril 10mg 1/2 - 1 tablet three times daily as needed    Take zanaflex 4mg 1/2 - 1 full at bedtime. Don't take flexeril and zanaflex together.

## 2020-06-24 PROBLEM — M54.31 SCIATICA OF RIGHT SIDE: Status: ACTIVE | Noted: 2020-06-24

## 2020-06-24 RX ORDER — LIDOCAINE HYDROCHLORIDE 10 MG/ML
3 INJECTION, SOLUTION INFILTRATION; PERINEURAL ONCE
Status: SHIPPED | OUTPATIENT
Start: 2020-06-24

## 2020-06-24 RX ORDER — METHYLPREDNISOLONE ACETATE 40 MG/ML
40 INJECTION, SUSPENSION INTRA-ARTICULAR; INTRALESIONAL; INTRAMUSCULAR; SOFT TISSUE ONCE
Status: DISCONTINUED | OUTPATIENT
Start: 2020-06-24 | End: 2021-07-30

## 2020-06-24 RX ORDER — DEXAMETHASONE SODIUM PHOSPHATE 10 MG/ML
4 INJECTION INTRAMUSCULAR; INTRAVENOUS ONCE
Status: DISCONTINUED | OUTPATIENT
Start: 2020-06-24 | End: 2021-07-30

## 2020-06-24 RX ORDER — BUPIVACAINE HYDROCHLORIDE 5 MG/ML
3 INJECTION, SOLUTION PERINEURAL ONCE
Status: SHIPPED | OUTPATIENT
Start: 2020-06-24

## 2020-06-24 ASSESSMENT — ENCOUNTER SYMPTOMS
RHINORRHEA: 0
CONSTIPATION: 0
BLOOD IN STOOL: 0
SHORTNESS OF BREATH: 0
WHEEZING: 0
TROUBLE SWALLOWING: 0
EYE REDNESS: 0
NAUSEA: 0
SORE THROAT: 0
VOMITING: 0
DIARRHEA: 0
CHEST TIGHTNESS: 0
ABDOMINAL PAIN: 0
VOICE CHANGE: 0
COUGH: 0

## 2020-12-23 RX ORDER — GABAPENTIN 300 MG/1
CAPSULE ORAL
Qty: 30 CAPSULE | Refills: 0 | OUTPATIENT
Start: 2020-12-23

## 2021-01-08 ENCOUNTER — VIRTUAL VISIT (OUTPATIENT)
Dept: PRIMARY CARE CLINIC | Age: 51
End: 2021-01-08
Payer: COMMERCIAL

## 2021-01-08 DIAGNOSIS — M19.90 ARTHRITIS: ICD-10-CM

## 2021-01-08 DIAGNOSIS — F41.9 ANXIETY: ICD-10-CM

## 2021-01-08 DIAGNOSIS — M54.31 SCIATICA OF RIGHT SIDE: ICD-10-CM

## 2021-01-08 DIAGNOSIS — M54.40 CHRONIC BILATERAL LOW BACK PAIN WITH SCIATICA, SCIATICA LATERALITY UNSPECIFIED: ICD-10-CM

## 2021-01-08 DIAGNOSIS — M79.7 FIBROMYALGIA: Primary | ICD-10-CM

## 2021-01-08 DIAGNOSIS — G89.29 OTHER CHRONIC PAIN: ICD-10-CM

## 2021-01-08 DIAGNOSIS — G89.29 CHRONIC BILATERAL LOW BACK PAIN WITH SCIATICA, SCIATICA LATERALITY UNSPECIFIED: ICD-10-CM

## 2021-01-08 PROCEDURE — 99443 PR PHYS/QHP TELEPHONE EVALUATION 21-30 MIN: CPT | Performed by: NURSE PRACTITIONER

## 2021-01-08 RX ORDER — DESVENLAFAXINE 50 MG/1
TABLET, EXTENDED RELEASE ORAL
Qty: 30 TABLET | Refills: 2 | Status: SHIPPED | OUTPATIENT
Start: 2021-01-08 | End: 2021-10-20

## 2021-01-08 RX ORDER — BUSPIRONE HYDROCHLORIDE 5 MG/1
TABLET ORAL
Qty: 90 TABLET | Refills: 2 | Status: SHIPPED | OUTPATIENT
Start: 2021-01-08 | End: 2021-12-14

## 2021-01-08 RX ORDER — GABAPENTIN 300 MG/1
300 CAPSULE ORAL NIGHTLY
Qty: 30 CAPSULE | Refills: 2 | Status: SHIPPED | OUTPATIENT
Start: 2021-01-08 | End: 2021-04-20

## 2021-01-08 RX ORDER — AMITRIPTYLINE HYDROCHLORIDE 25 MG/1
50 TABLET, FILM COATED ORAL NIGHTLY
Qty: 60 TABLET | Refills: 2 | Status: SHIPPED | OUTPATIENT
Start: 2021-01-08 | End: 2021-06-18

## 2021-01-08 RX ORDER — CELECOXIB 200 MG/1
200 CAPSULE ORAL DAILY
Qty: 30 CAPSULE | Refills: 2 | Status: SHIPPED | OUTPATIENT
Start: 2021-01-08 | End: 2021-09-17

## 2021-01-08 NOTE — PROGRESS NOTES
Martín Watson is a 52 y.o. female evaluated via telephone on 1/8/2021. Consent:  She and/or health care decision maker is aware that that she may receive a bill for this telephone service, depending on her insurance coverage, and has provided verbal consent to proceed: Yes      Documentation:    Back pain: notes that her back pain is worsening. Pt has stopped taking her amitriptyline, and pristiq. She states that she is taking the neurontin infrequently. She wishes to return to treatment of all three meds for she felt much better on the combination. Pt states that it is hard for her to move in the morning due to her chronic back pain and arthritis and has considered applying for disability. Review of Systems   Constitutional: Negative for activity change, appetite change, fatigue, fever and unexpected weight change. HENT: Negative for congestion, ear pain, nosebleeds, rhinorrhea, sore throat, trouble swallowing and voice change. Eyes: Negative for redness and visual disturbance. Respiratory: Negative for cough, chest tightness, shortness of breath and wheezing. Cardiovascular: Negative for chest pain, palpitations and leg swelling. Gastrointestinal: Negative for abdominal pain, blood in stool, constipation, diarrhea, nausea and vomiting. Endocrine: Negative for polydipsia, polyphagia and polyuria. Genitourinary: Negative for dysuria, frequency and urgency. Musculoskeletal: Positive for arthralgias and myalgias. Right hip low back pain   Skin: Negative for rash and wound. Neurological: Negative for dizziness, speech difficulty, light-headedness and headaches. Psychiatric/Behavioral: Negative for agitation, confusion, self-injury and suicidal ideas. The patient is not nervous/anxious. I communicated with the patient and/or health care decision maker about restart medications.    Details of this discussion including any medical advice provided:     Begin Celebrex 200 mg daily after food. Do not take with other NSAIDs such as naproxen, ibuprofen, or Aleve. Restart Pristiq 50 mg daily. This will help with your chronic back pain, arthritis. Continue other medications. If low back pain and wrist pain does not not improve with medication adjustments, we will refer to neurosurgeon of choice in [place]. Patient will contact us with this information. I affirm this is a Patient Initiated Episode with an Established Patient who has not had a related appointment within my department in the past 7 days or scheduled within the next 24 hours. Total Time: minutes: 21-30 minutes    Note: not billable if this call serves to triage the patient into an appointment for the relevant concern      [address] to the emergency declaration under the [facility], [address]5 waiver authority and the [employer] Act, this Virtual  Visit was conducted, with patient's consent, to reduce the patient's risk of exposure to COVID-19 and provide continuity of care for an established patient. Services were provided through a video synchronous discussion virtually to substitute for in-person clinic visit.

## 2021-01-08 NOTE — PATIENT INSTRUCTIONS
Begin Celebrex 200 mg daily after food. Do not take with other NSAIDs such as naproxen, ibuprofen, or Aleve. Restart Pristiq 50 mg daily. This will help with your chronic back pain, arthritis. Continue other medications. If low back pain and wrist pain does not not improve with medication adjustments, we will refer to neurosurgeon of choice in [place]. Patient will contact us with this information.

## 2021-01-18 ASSESSMENT — ENCOUNTER SYMPTOMS
SHORTNESS OF BREATH: 0
CONSTIPATION: 0
EYE REDNESS: 0
NAUSEA: 0
SORE THROAT: 0
TROUBLE SWALLOWING: 0
VOICE CHANGE: 0
BLOOD IN STOOL: 0
RHINORRHEA: 0
VOMITING: 0
WHEEZING: 0
ABDOMINAL PAIN: 0
COUGH: 0
DIARRHEA: 0
CHEST TIGHTNESS: 0

## 2021-04-19 DIAGNOSIS — M79.7 FIBROMYALGIA: ICD-10-CM

## 2021-04-19 DIAGNOSIS — M54.40 CHRONIC BILATERAL LOW BACK PAIN WITH SCIATICA, SCIATICA LATERALITY UNSPECIFIED: ICD-10-CM

## 2021-04-19 DIAGNOSIS — G89.29 CHRONIC BILATERAL LOW BACK PAIN WITH SCIATICA, SCIATICA LATERALITY UNSPECIFIED: ICD-10-CM

## 2021-04-20 RX ORDER — GABAPENTIN 300 MG/1
CAPSULE ORAL
Qty: 30 CAPSULE | Refills: 0 | Status: SHIPPED | OUTPATIENT
Start: 2021-04-20 | End: 2021-07-12

## 2021-06-18 DIAGNOSIS — G89.29 CHRONIC BILATERAL LOW BACK PAIN WITH SCIATICA, SCIATICA LATERALITY UNSPECIFIED: ICD-10-CM

## 2021-06-18 DIAGNOSIS — G89.29 OTHER CHRONIC PAIN: ICD-10-CM

## 2021-06-18 DIAGNOSIS — M54.40 CHRONIC BILATERAL LOW BACK PAIN WITH SCIATICA, SCIATICA LATERALITY UNSPECIFIED: ICD-10-CM

## 2021-06-18 RX ORDER — AMITRIPTYLINE HYDROCHLORIDE 25 MG/1
50 TABLET, FILM COATED ORAL NIGHTLY
Qty: 60 TABLET | Refills: 2 | Status: SHIPPED | OUTPATIENT
Start: 2021-06-18 | End: 2021-11-09 | Stop reason: SDUPTHER

## 2021-07-12 ENCOUNTER — APPOINTMENT (OUTPATIENT)
Dept: GENERAL RADIOLOGY | Age: 51
End: 2021-07-12
Payer: COMMERCIAL

## 2021-07-12 ENCOUNTER — TELEPHONE (OUTPATIENT)
Dept: ADMINISTRATIVE | Age: 51
End: 2021-07-12

## 2021-07-12 ENCOUNTER — HOSPITAL ENCOUNTER (EMERGENCY)
Age: 51
Discharge: HOME | End: 2021-07-12
Attending: EMERGENCY MEDICINE
Payer: COMMERCIAL

## 2021-07-12 ENCOUNTER — NURSE TRIAGE (OUTPATIENT)
Dept: OTHER | Facility: CLINIC | Age: 51
End: 2021-07-12

## 2021-07-12 VITALS
DIASTOLIC BLOOD PRESSURE: 89 MMHG | TEMPERATURE: 97.9 F | RESPIRATION RATE: 18 BRPM | SYSTOLIC BLOOD PRESSURE: 155 MMHG | OXYGEN SATURATION: 98 % | HEART RATE: 78 BPM

## 2021-07-12 DIAGNOSIS — I10 ASYMPTOMATIC HYPERTENSION: Primary | ICD-10-CM

## 2021-07-12 LAB
ALBUMIN SERPL-MCNC: 4.5 G/DL (ref 3.5–5.2)
ALP BLD-CCNC: 58 U/L (ref 35–104)
ALT SERPL-CCNC: 74 U/L (ref 5–33)
ANION GAP SERPL CALCULATED.3IONS-SCNC: 11 MMOL/L (ref 7–19)
AST SERPL-CCNC: 42 U/L (ref 5–32)
BASOPHILS ABSOLUTE: 0.1 K/UL (ref 0–0.2)
BASOPHILS RELATIVE PERCENT: 0.8 % (ref 0–1)
BILIRUB SERPL-MCNC: 0.4 MG/DL (ref 0.2–1.2)
BUN BLDV-MCNC: 10 MG/DL (ref 6–20)
CALCIUM SERPL-MCNC: 9.4 MG/DL (ref 8.6–10)
CHLORIDE BLD-SCNC: 102 MMOL/L (ref 98–111)
CO2: 25 MMOL/L (ref 22–29)
CREAT SERPL-MCNC: 0.7 MG/DL (ref 0.5–0.9)
EKG P AXIS: 45 DEGREES
EKG P-R INTERVAL: 154 MS
EKG Q-T INTERVAL: 400 MS
EKG QRS DURATION: 72 MS
EKG QTC CALCULATION (BAZETT): 419 MS
EKG T AXIS: 44 DEGREES
EOSINOPHILS ABSOLUTE: 0.1 K/UL (ref 0–0.6)
EOSINOPHILS RELATIVE PERCENT: 1.6 % (ref 0–5)
GFR AFRICAN AMERICAN: >59
GFR NON-AFRICAN AMERICAN: >60
GLUCOSE BLD-MCNC: 104 MG/DL (ref 74–109)
HCT VFR BLD CALC: 49.8 % (ref 37–47)
HEMOGLOBIN: 17.1 G/DL (ref 12–16)
IMMATURE GRANULOCYTES #: 0 K/UL
LYMPHOCYTES ABSOLUTE: 2.4 K/UL (ref 1.1–4.5)
LYMPHOCYTES RELATIVE PERCENT: 31.6 % (ref 20–40)
MCH RBC QN AUTO: 30.5 PG (ref 27–31)
MCHC RBC AUTO-ENTMCNC: 34.3 G/DL (ref 33–37)
MCV RBC AUTO: 88.8 FL (ref 81–99)
MONOCYTES ABSOLUTE: 0.7 K/UL (ref 0–0.9)
MONOCYTES RELATIVE PERCENT: 8.7 % (ref 0–10)
NEUTROPHILS ABSOLUTE: 4.4 K/UL (ref 1.5–7.5)
NEUTROPHILS RELATIVE PERCENT: 57.2 % (ref 50–65)
PDW BLD-RTO: 11.8 % (ref 11.5–14.5)
PLATELET # BLD: 351 K/UL (ref 130–400)
PMV BLD AUTO: 10.3 FL (ref 9.4–12.3)
POTASSIUM SERPL-SCNC: 4 MMOL/L (ref 3.5–5)
RBC # BLD: 5.61 M/UL (ref 4.2–5.4)
SODIUM BLD-SCNC: 138 MMOL/L (ref 136–145)
TOTAL PROTEIN: 7.5 G/DL (ref 6.6–8.7)
WBC # BLD: 7.7 K/UL (ref 4.8–10.8)

## 2021-07-12 PROCEDURE — 93010 ELECTROCARDIOGRAM REPORT: CPT | Performed by: INTERNAL MEDICINE

## 2021-07-12 PROCEDURE — 85025 COMPLETE CBC W/AUTO DIFF WBC: CPT

## 2021-07-12 PROCEDURE — 99282 EMERGENCY DEPT VISIT SF MDM: CPT

## 2021-07-12 PROCEDURE — 71045 X-RAY EXAM CHEST 1 VIEW: CPT

## 2021-07-12 PROCEDURE — 80053 COMPREHEN METABOLIC PANEL: CPT

## 2021-07-12 PROCEDURE — 93005 ELECTROCARDIOGRAM TRACING: CPT | Performed by: EMERGENCY MEDICINE

## 2021-07-12 PROCEDURE — 36415 COLL VENOUS BLD VENIPUNCTURE: CPT

## 2021-07-12 RX ORDER — HEPARIN SODIUM 10000 [USP'U]/100ML
5-30 INJECTION, SOLUTION INTRAVENOUS CONTINUOUS
Status: DISCONTINUED | OUTPATIENT
Start: 2021-07-12 | End: 2021-07-12

## 2021-07-12 RX ORDER — HEPARIN SODIUM 1000 [USP'U]/ML
40 INJECTION, SOLUTION INTRAVENOUS; SUBCUTANEOUS PRN
Status: DISCONTINUED | OUTPATIENT
Start: 2021-07-12 | End: 2021-07-12

## 2021-07-12 RX ORDER — HEPARIN SODIUM 1000 [USP'U]/ML
80 INJECTION, SOLUTION INTRAVENOUS; SUBCUTANEOUS ONCE
Status: DISCONTINUED | OUTPATIENT
Start: 2021-07-12 | End: 2021-07-12

## 2021-07-12 RX ORDER — HEPARIN SODIUM 1000 [USP'U]/ML
80 INJECTION, SOLUTION INTRAVENOUS; SUBCUTANEOUS PRN
Status: DISCONTINUED | OUTPATIENT
Start: 2021-07-12 | End: 2021-07-12

## 2021-07-12 ASSESSMENT — ENCOUNTER SYMPTOMS
COUGH: 0
DIARRHEA: 0
EYE PAIN: 0
EYE REDNESS: 0
VOMITING: 0
RHINORRHEA: 0
SHORTNESS OF BREATH: 0
VOICE CHANGE: 0
ABDOMINAL PAIN: 0

## 2021-07-12 NOTE — TELEPHONE ENCOUNTER
Reason for Disposition   Systolic BP >= 296 OR Diastolic >= 103, and any cardiac or neurologic symptoms (e.g., chest pain, difficulty breathing, unsteady gait, blurred vision)    Answer Assessment - Initial Assessment Questions  1. BLOOD PRESSURE: \"What is the blood pressure? \" \"Did you take at least two measurements 5 minutes apart? \"      Was at dentist and had three separate high readings    194/120 and then 203/112    2. ONSET: \"When did you take your blood pressure? \"      About 30 minutes ago    3. HOW: \"How did you obtain the blood pressure? \" (e.g., visiting nurse, automatic home BP monitor)      Automatic arm cuff    4. HISTORY: \"Do you have a history of high blood pressure? \"      History of HTN    5. MEDICATIONS: [facility] you taking any medications for blood pressure? \" \"Have you missed any doses recently? \"      Denies taking any medications    6. OTHER SYMPTOMS: \"Do you have any symptoms? \" (e.g., headache, chest pain, blurred vision, difficulty breathing, weakness)      States having some SOB, some mild dizziness    7. PREGNANCY: \"Is there any chance you are pregnant? \" \"When was your last menstrual period? \"      Denies. Hysterectomy    Protocols used: HIGH BLOOD PRESSURE-ADULT-[place]    Received call from Dianne Edwards at [facility] with Red Flag Complaint. Brief description of triage: Pt was at dentist, has high BP readings (see above numbers), states is having some SOB and mild dizziness    Triage indicates for patient to 2nd level triage. Attempted to reach office x 4, was unable to speak with anyone at office. Triaged up per protocol and advised pt to go to ED. Care advice provided, patient verbalizes understanding; denies any other questions or concerns; instructed to call back for any new or worsening symptoms. Attention Provider: Thank you for allowing me to participate in the care of your patient. The patient was connected to triage in response to information provided to the [facility].   Please do not

## 2021-07-12 NOTE — ED TRIAGE NOTES
Pt here with htn and dizziness. Pt notes that she was at a dental appt and they checked her bp and it was 190's sbp.

## 2021-07-12 NOTE — ED PROVIDER NOTES
[facility] EMERGENCY DEPT  EMERGENCY DEPARTMENT ENCOUNTER      Pt Name: Jackson Hankins  MRN: [id number]  [place] 1970  Date of evaluation: 7/12/2021  Provider: Maricarmen Tristan MD    CHIEF COMPLAINT       Chief Complaint   Patient presents with    Hypertension         HISTORY OF PRESENT ILLNESS   (Location/Symptom, Timing/Onset,Context/Setting, Quality, Duration, Modifying Factors, Severity)  Note limiting factors. Jackson Hankins is a 54 y.o. female who presents to the emergency department for evaluation after she was found to have elevated blood pressure reading in a dentist office where she was seen for a tooth filling today. Patient states she has had some dizziness today which is not of the ordinary for her and she has a history of Ménière's disease. Denies any headache, blurred vision, chest pain, shortness of breath, or other specific symptoms. Patient states she has been diagnosed with high blood pressure in the past and was on a low-dose of lisinopril but was having problems with her blood pressure bottoming out on that and she was taken off of it about a year ago. Patient has a history of anxiety and Katherin Calvin but no other significant chronic medical problems. States her blood pressure got as high as 861 systolic at the dentist office today. She called her primary care provider to try and schedule an appointment but was advised to come here. HPI    NursingNotes were reviewed. REVIEW OF SYSTEMS    (2-9 systems for level 4, 10 or more for level 5)     Review of Systems   Constitutional: Negative for fatigue and fever. HENT: Negative for congestion, rhinorrhea and voice change. Eyes: Negative for pain and redness. Respiratory: Negative for cough and shortness of breath. Cardiovascular: Negative for chest pain. Gastrointestinal: Negative for abdominal pain, diarrhea and vomiting. Endocrine: Negative. Genitourinary: Negative. Musculoskeletal: Negative for arthralgias and gait problem. Skin: Negative for rash and wound. Neurological: Positive for dizziness. Negative for weakness and headaches. Hematological: Negative. Psychiatric/Behavioral: Negative. All other systems reviewed and are negative. A complete review of systems was performed and is negative except as noted above in the HPI.        PAST MEDICAL HISTORY     Past Medical History:   Diagnosis Date    Anxiety 2020    Asthma     Chronic back pain 2018    Chronic migraine without aura, intractable, without status migrainosus     Colon polyps     Depression 2018    Elevated blood pressure, situational 2018    Essential tremor     Fibromyalgia     Liver disease     Fatty Liver    Meniere disease 2017    Osteoarthritis     Sciatica of right side 2020    Tinnitus          SURGICAL HISTORY       Past Surgical History:   Procedure Laterality Date     SECTION      CHOLECYSTECTOMY, LAPAROSCOPIC  2015    Dr. Rudy Marti?    ?    HYSTERECTOMY      LIVER BIOPSY  2015    Dr. Bella Silva       Previous Medications    AMITRIPTYLINE (ELAVIL) 25 MG TABLET    TAKE 2 TABLETS BY MOUTH NIGHTLY     BUSPIRONE (BUSPAR) 5 MG TABLET    TAKE 1 TABLET THREE TIMES DAILY AS NEEDED FOR ANXIETY    CELECOXIB (CELEBREX) 200 MG CAPSULE    Take 1 capsule by mouth daily    DESVENLAFAXINE SUCCINATE (PRISTIQ) 50 MG TB24 EXTENDED RELEASE TABLET    TAKE 1 TABLET BY MOUTH ONCE DAILY    FLUTICASONE (FLONASE) 50 MCG/ACT NASAL SPRAY    1 spray by Nasal route daily    VITAMIN E 1000 UNITS CAPSULE    Take 1,000 Units by mouth daily       ALLERGIES     Latex, Codeine, Morphine, Morphine and related, and Eggs or egg-derived products    FAMILY HISTORY       Family History   Problem Relation Age of Onset    Colon Polyps Paternal Grandfather     Liver Cancer Paternal Grandfather     Colon Polyps Mother     Liver Cancer Mother     Heart Disease Mother     Cancer Mother    [facility] Diabetes Mother     Colon Polyps Maternal Grandmother     Heart Disease Father     Cancer Father     Colon Cancer Neg Hx     Esophageal Cancer Neg Hx     Liver Disease Neg Hx     Rectal Cancer Neg Hx     Stomach Cancer Neg Hx           SOCIAL HISTORY       Social History     Socioeconomic History    Marital status:      Spouse name: None    Number of children: None    Years of education: None    Highest education level: None   Occupational History    None   Tobacco Use    Smoking status: Never Smoker    Smokeless tobacco: Never Used   Vaping Use    Vaping Use: Never used   Substance and Sexual Activity    Alcohol use: No    Drug use: No    Sexual activity: None   Other Topics Concern    None   Social History Narrative    None     Social Determinants of Health     Financial Resource Strain:     Difficulty of Paying Living Expenses:    Food Insecurity:     Worried About Running Out of Food in the Last Year:     Ran Out of Food in the Last Year:    Transportation Needs:     Lack of Transportation (Medical):  Lack of Transportation (Non-Medical):    Physical Activity:     Days of Exercise per Week:     Minutes of Exercise per Session:    Stress:     Feeling of Stress :    Social Connections:     Frequency of Communication with Friends and Family:     Frequency of Social Gatherings with Friends and Family:     Attends Christianity Services:     Active Member of Clubs or Organizations:     Attends Club or Organization Meetings:     Marital Status:    Intimate Partner Violence:     Fear of Current or Ex-Partner:     Emotionally Abused:     Physically Abused:     Sexually Abused:        SCREENINGS             PHYSICAL EXAM    (up to 7 for level 4, 8 or more for level 5)     ED Triage Vitals [07/12/21 1217]   BP Temp Temp src Pulse Resp SpO2 Height Weight   (!) 188/90 99.1 °F (37.3 °C) -- 87 18 98 % -- --       Physical Exam  Vitals and nursing note reviewed.    Constitutional: General: She is not in acute distress. Appearance: She is well-developed. She is not diaphoretic. HENT:      Head: Normocephalic and atraumatic. Eyes:      General: No scleral icterus. Neck:      Vascular: No JVD. Cardiovascular:      Rate and Rhythm: Normal rate and regular rhythm. Pulses:           Radial pulses are 2+ on the right side and 2+ on the left side. Dorsalis pedis pulses are 2+ on the right side and 2+ on the left side. Heart sounds: Normal heart sounds. No murmur heard. No friction rub. No gallop. Pulmonary:      Effort: Pulmonary effort is normal. No accessory muscle usage or respiratory distress. Breath sounds: Normal breath sounds. No stridor. No decreased breath sounds, wheezing, rhonchi or rales. Chest:      Chest wall: No tenderness. Abdominal:      General: There is no distension. Palpations: Abdomen is soft. Tenderness: There is no abdominal tenderness. There is no guarding or rebound. Musculoskeletal:         General: No deformity. Normal range of motion. Right lower leg: No edema. Left lower leg: No edema. Skin:     General: Skin is warm and dry. Coloration: Skin is not pale. Findings: No erythema. Neurological:      Mental Status: She is alert and oriented to person, place, and time. GCS: GCS eye subscore is 4. GCS verbal subscore is 5. GCS motor subscore is 6. Cranial Nerves: No cranial nerve deficit. Motor: No abnormal muscle tone.       Coordination: Coordination normal.   Psychiatric:         Behavior: Behavior normal.         Judgment: Judgment normal.         DIAGNOSTIC RESULTS     EKG: All EKG's are interpreted by the Emergency Department Physician who either signs or Co-signs this chart in the absence of a cardiologist.        RADIOLOGY:   Non-plain film images such as CT, Ultrasound and MRI are read by the radiologist. Plainradiographic images are visualized and preliminarily interpreted by the emergency physician with the below findings:        Interpretation per the Radiologist below, if available at the time of this note:    XR CHEST PORTABLE   Final Result   1. No radiographic evidence of acute cardiopulmonary process. Signed by Dr Jae Partida            ED BEDSIDE ULTRASOUND:   Performed by ED Physician - none    LABS:  Labs Reviewed   CBC WITH AUTO DIFFERENTIAL - Abnormal; Notable for the following components:       Result Value    RBC 5.61 (*)     Hemoglobin 17.1 (*)     Hematocrit 49.8 (*)     All other components within normal limits   COMPREHENSIVE METABOLIC PANEL - Abnormal; Notable for the following components:    ALT 74 (*)     AST 42 (*)     All other components within normal limits       All other labs were within normal range or not returned as of this dictation. Medications - No data to display    EMERGENCY DEPARTMENT COURSE and DIFFERENTIALDIAGNOSIS/MDM:   Vitals:    Vitals:    07/12/21 1217 07/12/21 1250 07/12/21 1331   BP: (!) 188/90 (!) 169/105 (!) 155/98   Pulse: 87 86 81   Resp: 18 17 21   Temp: 99.1 °F (37.3 °C)     SpO2: 98%         MDM    Blood pressure improved to appropriate range with systolic around 040 spontaneously here. No evidence of endorgan damage. No ischemia on EKG. No renal insufficiency, electrolyte derangements, or other associated findings. Normal neurologic and cardiovascular exam.  Advised patient to keep blood pressure log and follow-up with primary care provider. Evaluation and work-up here revealed no signs of emergent or life-threatening pathology that would necessitate admission for further work-up or management at this time. Patient is felt to be stable for discharge home with return precautions for worsening of the condition or development of new concerning symptoms. Patient was encouraged to follow-up with their primary care doctor in the appropriate timeframe.   Necessary prescriptions and information have been provided for treatment at home. Patient voices understanding and agreement with the plan. CONSULTS:  None    PROCEDURES:  Unless otherwise notedbelow, none     Procedures      FINAL IMPRESSION     1.  Asymptomatic hypertension          DISPOSITION/PLAN   DISPOSITION Decision To Discharge 07/12/2021 01:30:53 PM      PATIENT REFERRED TO:  [facility] EMERGENCY DEPT  Rupert Maloney  [phone]    If symptoms worsen    JUAN JOSE Al  [address] [place] [address] Dr MCBRIDE [facility] [place] [phone]    Schedule an appointment as soon as possible for a visit         DISCHARGE MEDICATIONS:  New Prescriptions    No medications on file          (Please note that portions of this note were completed with a voice recognition program.  Efforts were made to edit the dictations butoccasionally words are mis-transcribed.)    Virginie Ibarra MD (electronically signed)  AttendingEmerOzarks Community Hospitalcy Physician          Virginie Greene MD  07/12/21 8289

## 2021-07-16 ENCOUNTER — VIRTUAL VISIT (OUTPATIENT)
Dept: PRIMARY CARE CLINIC | Age: 51
End: 2021-07-16
Payer: COMMERCIAL

## 2021-07-16 DIAGNOSIS — Z12.11 SCREENING FOR COLON CANCER: ICD-10-CM

## 2021-07-16 DIAGNOSIS — I10 HYPERTENSION, ESSENTIAL, BENIGN: Primary | ICD-10-CM

## 2021-07-16 DIAGNOSIS — R19.4 ENCOUNTER FOR DIAGNOSTIC COLONOSCOPY DUE TO CHANGE IN BOWEL HABITS: ICD-10-CM

## 2021-07-16 PROCEDURE — 99213 OFFICE O/P EST LOW 20 MIN: CPT | Performed by: NURSE PRACTITIONER

## 2021-07-16 RX ORDER — HYDROCHLOROTHIAZIDE 25 MG/1
12.5 TABLET ORAL DAILY
Qty: 30 TABLET | Refills: 2 | Status: SHIPPED | OUTPATIENT
Start: 2021-07-16 | End: 2021-09-16 | Stop reason: SDUPTHER

## 2021-07-16 ASSESSMENT — ENCOUNTER SYMPTOMS
CHEST TIGHTNESS: 0
TROUBLE SWALLOWING: 0
SORE THROAT: 0
RHINORRHEA: 0
EYE REDNESS: 0
SHORTNESS OF BREATH: 0
VOMITING: 0
COUGH: 0
VOICE CHANGE: 0
CONSTIPATION: 0
BLOOD IN STOOL: 0
ABDOMINAL PAIN: 0
DIARRHEA: 0
NAUSEA: 0
WHEEZING: 0

## 2021-07-16 ASSESSMENT — PATIENT HEALTH QUESTIONNAIRE - PHQ9
2. FEELING DOWN, DEPRESSED OR HOPELESS: 0
SUM OF ALL RESPONSES TO PHQ QUESTIONS 1-9: 0
SUM OF ALL RESPONSES TO PHQ9 QUESTIONS 1 & 2: 0
SUM OF ALL RESPONSES TO PHQ QUESTIONS 1-9: 0
SUM OF ALL RESPONSES TO PHQ QUESTIONS 1-9: 0
1. LITTLE INTEREST OR PLEASURE IN DOING THINGS: 0

## 2021-07-16 NOTE — PROGRESS NOTES
Armando Moses (:  1970) is a 54 y.o. female,Established patient, here for evaluation of the following chief complaint(s): ED Follow-up (Hypertension. Pt states it has been ranging from 154/97 up to 172/98)         ASSESSMENT/PLAN:  1. Hypertension, essential, benign  -     hydroCHLOROthiazide (HYDRODIURIL) 25 MG tablet; Take 0.5 tablets by mouth daily, Disp-30 tablet, R-2Normal  -     CBC Auto Differential; Future  -     Comprehensive Metabolic Panel; Future  -     Lipid Panel; Future  -     T4, Free; Future  -     TSH without Reflex; Future  -     Urinalysis; Future  2. Encounter for diagnostic colonoscopy due to change in bowel habits  3. Screening for colon cancer  -     External Referral To Gastroenterology      Return in about 1 month (around 2021). SUBJECTIVE/OBJECTIVE:  HPI    Elevated blood pressure on recent visit to the ER. Has been monitoring at home and notes elevation. Patient denies NSAID use, or over the counter allergy medication. She denies excessive intake of sodium, or starting any new medications. Patient denies chest pain, sob, or headaches. Review of Systems   Constitutional: Negative for activity change, appetite change, fatigue, fever and unexpected weight change. HENT: Negative for congestion, ear pain, nosebleeds, rhinorrhea, sore throat, trouble swallowing and voice change. Eyes: Negative for redness and visual disturbance. Respiratory: Negative for cough, chest tightness, shortness of breath and wheezing. Cardiovascular: Negative for chest pain, palpitations and leg swelling. Gastrointestinal: Negative for abdominal pain, blood in stool, constipation, diarrhea, nausea and vomiting. Endocrine: Negative for polydipsia, polyphagia and polyuria. Genitourinary: Negative for dysuria, frequency and urgency. Musculoskeletal: Positive for myalgias. Negative for arthralgias. Skin: Negative for rash and wound.    Neurological: Negative for dizziness, speech difficulty, light-headedness and headaches. Psychiatric/Behavioral: Negative for agitation, confusion, self-injury and suicidal ideas. The patient is not nervous/anxious.         Patient-Reported Vitals 7/16/2021   Patient-Reported Weight 145 lbs   Patient-Reported Height 5' 6\"   Patient-Reported Systolic 680   Patient-Reported Diastolic 91   Patient-Reported Pulse -        Physical Exam    [INSTRUCTIONS:  \"[x]\" Indicates a positive item  \"[]\" Indicates a negative item  -- DELETE ALL ITEMS NOT EXAMINED]    Constitutional: [x] Appears well-developed and well-nourished [x] No apparent distress      [] Abnormal -     Mental status: [x] Alert and awake  [x] Oriented to person/place/time [x] Able to follow commands    [] Abnormal -     Eyes:   EOM    [x]  Normal    [] Abnormal -   Sclera  [x]  Normal    [] Abnormal -          Discharge [x]  None visible   [] Abnormal -     HENT: [x] Normocephalic, atraumatic  [] Abnormal -   [x] Mouth/Throat: Mucous membranes are moist    External Ears [x] Normal  [] Abnormal -    Neck: [x] No visualized mass [] Abnormal -     Pulmonary/Chest: [x] Respiratory effort normal   [x] No visualized signs of difficulty breathing or respiratory distress        [] Abnormal -      Musculoskeletal:   [x] Normal gait with no signs of ataxia         [x] Normal range of motion of neck        [] Abnormal -     Neurological:        [x] No Facial Asymmetry (Cranial nerve 7 motor function) (limited exam due to video visit)          [x] No gaze palsy        [] Abnormal -          Skin:        [x] No significant exanthematous lesions or discoloration noted on facial skin         [] Abnormal -            Psychiatric:       [x] Normal Affect [] Abnormal -        [x] No Hallucinations    Other pertinent observable physical exam findings:-          On this date 7/16/2021 I have spent 15 minutes reviewing previous notes, test results and face to face (virtual) with the patient discussing the diagnosis and importance of compliance with the treatment plan as well as documenting on the day of the visit. Gianfranco Sparks, was evaluated through a synchronous (real-time) audio-video encounter. The patient (or guardian if applicable) is aware that this is a billable service. Verbal consent to proceed has been obtained within the past 12 months. The visit was conducted pursuant to the emergency declaration under the [address] [place] [address] and the [employer] General Act. Patient identification was verified, and a caregiver was present when appropriate. The patient was located in a state where the provider was credentialed to provide care. An electronic signature was used to authenticate this note.     --JUAN JOSE Purvis

## 2021-07-30 ENCOUNTER — OFFICE VISIT (OUTPATIENT)
Dept: URGENT CARE | Age: 51
End: 2021-07-30
Payer: COMMERCIAL

## 2021-07-30 DIAGNOSIS — L23.7 POISON IVY DERMATITIS: Primary | ICD-10-CM

## 2021-07-30 PROCEDURE — 99213 OFFICE O/P EST LOW 20 MIN: CPT | Performed by: NURSE PRACTITIONER

## 2021-07-30 PROCEDURE — 96372 THER/PROPH/DIAG INJ SC/IM: CPT | Performed by: NURSE PRACTITIONER

## 2021-07-30 RX ORDER — TRIAMCINOLONE ACETONIDE 1 MG/G
CREAM TOPICAL
Qty: 15 G | Refills: 0 | Status: ON HOLD | OUTPATIENT
Start: 2021-07-30 | End: 2022-05-08

## 2021-07-30 RX ORDER — DEXAMETHASONE SODIUM PHOSPHATE 10 MG/ML
5 INJECTION INTRAMUSCULAR; INTRAVENOUS ONCE
Status: COMPLETED | OUTPATIENT
Start: 2021-07-30 | End: 2021-07-30

## 2021-07-30 RX ADMIN — DEXAMETHASONE SODIUM PHOSPHATE 5 MG: 10 INJECTION INTRAMUSCULAR; INTRAVENOUS at 08:33

## 2021-07-30 NOTE — PATIENT INSTRUCTIONS
Patient Education        Poison [place], [place], and [place]: Care Instructions  Your Care Instructions     Poison ivy, poison oak, and poison sumac are plants that can cause a skin rash upon contact. The red, itchy rash often shows up in lines or streaks and may cause fluid-filled blisters or large, raised hives. The rash is caused by an allergic reaction to an oil in poison ivy, oak, and sumac. The rash may occur when you touch the plant or when you touch clothing, pet fur, sporting gear, gardening tools, or other objects that have come in contact with one of these plants. You cannot catch or spread the rash, even if you touch it or the blister fluid, because the plant oil will already have been absorbed or washed off the skin. The rash may seem to be spreading, but either it is still developing from earlier contact or you have touched something that still has the plant oil on it. Follow-up care is a key part of your treatment and safety. Be sure to make and go to all appointments, and call your doctor if you are having problems. It's also a good idea to know your test results and keep a list of the medicines you take. How can you care for yourself at home? · If your doctor prescribed a cream, use it as directed. If your doctor prescribed medicine, take it exactly as prescribed. Call your doctor if you think you are having a problem with your medicine. · Use cold, wet cloths to reduce itching. · Keep cool, and stay out of the sun. · Leave the rash open to the air. · Wash all clothing or other things that may have come in contact with the plant oil. · Avoid most lotions and ointments until the rash heals. Calamine lotion may help relieve symptoms of a plant rash. Use it 3 or 4 times a day. To prevent poison ivy exposure  If you know that you will be near poison ivy, oak, or sumac, you can try these options:  · Use a product designed to help prevent plant oil from getting on the skin.  These products, such as [place] X Pre-Contact Skin Solution, come in lotions, sprays, or towelettes. You put the product on your skin right before you go outdoors. · If you did not use a preventive product and you have had contact with plant oil, clean it off your skin as soon as possible. Use a product such as [employer] Original Outdoor Skin Cleanser. These products can also be used to clean plant oil from clothing or tools. When should you call for help? Call your doctor now or seek immediate medical care if:    · Your rash gets worse, and you start to feel bad and have a fever, a stiff neck, nausea, and vomiting.     · You have signs of infection, such as:  ? Increased pain, swelling, warmth, or redness. ? Red streaks leading from the rash. ? Pus draining from the rash. ? A fever. Watch closely for changes in your health, and be sure to contact your doctor if:    · You have new blisters or bruises, or the rash spreads and looks like a sunburn.     · The rash gets worse, or it comes back after nearly disappearing.     · You think a medicine you are using is making your rash worse.     · Your rash does not clear up after 1 to 2 weeks of home treatment.     · You have joint aches or body aches with your rash. Where can you learn more? Go to https://[employer]. org and sign in to your [employer] account. Enter X317 in the [facility] box to learn more about \"Poison [place], [place], and [place]: Care Instructions. \"     If you do not have an account, please click on the \"Sign Up Now\" link. Current as of: March 3, 2021               Content Version: 12.9  © 2303-9792 [employer]. Care instructions adapted under license by [facility] ([facility]). If you have questions about a medical condition or this instruction, always ask your healthcare professional. [place] [address] any warranty or liability for your use of this information.        1. For some people, adding oatmeal to a bath, applying cool wet compresses, and applying calamine lotion may help to relieve itching. 2. Antihistamines do not help the rash caused by poison ivy, but benadryl may help you sleep at night. 3. Steroid creams may be helpful if they are used during the first few days after symptoms develop  4. Steroid IM in office   5. If not improving or developing any new/worsening symptoms then return to clinic as needed or go to ER.  follow up with PCP as needed. 6. Take your BP pill when you get home. Monitor BP frequently today as steroids can make BP higher.

## 2021-07-30 NOTE — PROGRESS NOTES
[address] [facility]  [address] [address] [place] [address]  Dept: [phone]  Dept Fax: [phone]  Loc: [phone]    Sebastian Gómez is a 54 y.o. female who presents today for her medical conditions/complaintsas noted below. Sebastian Gómez is c/o of Rash (all over her body onset 2 days ago started small and is spreading and itchy) and Poison Carmencita Ouch (is allergic to poison ivy)        HPI:     Rash  This is a new problem. The current episode started in the past 7 days. The problem is unchanged. The affected locations include the right arm, abdomen, chest, neck and face. The rash is characterized by redness and itchiness. She was exposed to plant contact. Pertinent negatives include no fever. Past treatments include anti-itch cream (calamine). Poison [place]  Pertinent negatives include no fever.        Past Medical History:   Diagnosis Date    Anxiety 2020    Asthma     Chronic back pain 2018    Chronic migraine without aura, intractable, without status migrainosus     Colon polyps     Depression 2018    Elevated blood pressure, situational 2018    Essential tremor     Fibromyalgia     Liver disease     Fatty Liver    Meniere disease 2017    Osteoarthritis     Sciatica of right side 2020    Tinnitus      Past Surgical History:   Procedure Laterality Date     SECTION      CHOLECYSTECTOMY, LAPAROSCOPIC  2015    Dr. Shirley Wolf  ?    ?    HYSTERECTOMY      LIVER BIOPSY  2015    Dr. Guerita Zavala       Family History   Problem Relation Age of Onset    Colon Polyps Paternal Grandfather     Liver Cancer Paternal Grandfather     Colon Polyps Mother     Liver Cancer Mother     Heart Disease Mother     Cancer Mother     Diabetes Mother     Colon Polyps Maternal Grandmother     Heart Disease Father     Cancer Father     Colon Cancer Neg Hx     Esophageal Cancer Neg Hx     Liver Disease Neg Hx     Rectal Cancer Neg Hx     Stomach Cancer Neg Hx        Social History     Tobacco Use    Smoking status: Never Smoker    Smokeless tobacco: Never Used   Substance Use Topics    Alcohol use: No      Current Outpatient Medications   Medication Sig Dispense Refill    triamcinolone (KENALOG) 0.1 % cream Apply topically 2 times daily as needed for itching.  15 g 0    hydroCHLOROthiazide (HYDRODIURIL) 25 MG tablet Take 0.5 tablets by mouth daily 30 tablet 2    busPIRone (BUSPAR) 5 MG tablet TAKE 1 TABLET THREE TIMES DAILY AS NEEDED FOR ANXIETY 90 tablet 2    desvenlafaxine succinate (PRISTIQ) 50 MG TB24 extended release tablet TAKE 1 TABLET BY MOUTH ONCE DAILY 30 tablet 2    vitamin E 1000 units capsule Take 1,000 Units by mouth daily      amitriptyline (ELAVIL) 25 MG tablet TAKE 2 TABLETS BY MOUTH NIGHTLY  60 tablet 2    celecoxib (CELEBREX) 200 MG capsule Take 1 capsule by mouth daily 30 capsule 2    fluticasone (FLONASE) 50 MCG/ACT nasal spray 1 spray by Nasal route daily 1 Bottle 5     Current Facility-Administered Medications   Medication Dose Route Frequency Provider Last Rate Last Admin    dexamethasone (DECADRON) injection 5 mg  5 mg Intramuscular Once JUAN JOSE Peck        bupivacaine (MARCAINE) 0.5 % injection 15 mg  3 mL Intra-articular Once Jose Luis Dennis, APRN        lidocaine 1 % injection 3 mL  3 mL Intradermal Once Jose Luis Dennis APRN         Allergies   Allergen Reactions    Latex Rash, Itching, Swelling and Hives    Codeine Anaphylaxis    Morphine Anaphylaxis    Morphine And Related Anaphylaxis    Eggs Or Egg-Derived Products        Health Maintenance   Topic Date Due    Hepatitis C screen  Never done    COVID-19 Vaccine (1) Never done    HIV screen  Never done    DTaP/Tdap/Td vaccine (1 - Tdap) Never done    Colon cancer screen colonoscopy  Never done    Cervical cancer screen  10/27/2018    A1C test (Diabetic or Prediabetic)  06/06/2019    Flu vaccine (1) 09/01/2021    Potassium monitoring  07/12/2022    Creatinine monitoring  07/12/2022    Lipid screen  06/06/2023    Pneumococcal 0-64 years Vaccine (2 of 2 - PPSV23) 03/08/2037    Hepatitis A vaccine  Aged Out    Hepatitis B vaccine  Aged Out    Hib vaccine  Aged Out    Meningococcal (ACWY) vaccine  Aged Out       Subjective:     Review of Systems   Constitutional: Negative for fever. Skin: Positive for rash. All other systems reviewed and are negative.      :Objective      Physical Exam  Vitals and nursing note reviewed. Constitutional:       General: She is not in acute distress. Appearance: Normal appearance. She is well-developed. She is not ill-appearing or diaphoretic. HENT:      Head: Normocephalic and atraumatic. Eyes:      Conjunctiva/sclera: Conjunctivae normal.      Pupils: Pupils are equal, round, and reactive to light. Cardiovascular:      Rate and Rhythm: Normal rate and regular rhythm. Heart sounds: Normal heart sounds. No murmur heard. Pulmonary:      Effort: Pulmonary effort is normal. No respiratory distress. Breath sounds: Normal breath sounds. No wheezing, rhonchi or rales. Musculoskeletal:      Cervical back: Normal range of motion. Skin:     General: Skin is warm and dry. Findings: Rash present. Neurological:      Mental Status: She is alert and oriented to person, place, and time. Psychiatric:         Mood and Affect: Mood normal.         Behavior: Behavior normal.       There were no vitals taken for this visit.    :Assessment       Diagnosis Orders   1. Poison ivy dermatitis  dexamethasone (DECADRON) injection 5 mg    triamcinolone (KENALOG) 0.1 % cream       :Plan   Pt BP is elevated. She reports she did not take her BP medicine this morning. I advised that steroids would/could elevated her BP. She voiced understanding and would still like to proceed with a steroid shot due to the intense itching.  She promises that when she gets home she will take her medicine and closely monitor her BP today. 1. For some people, adding oatmeal to a bath, applying cool wet compresses, and applying calamine lotion may help to relieve itching. 2. Antihistamines do not help the rash caused by poison ivy, but benadryl may help you sleep at night. 3. Steroid creams may be helpful if they are used during the first few days after symptoms develop  4. Steroid IM in office   5. If not improving or developing any new/worsening symptoms then return to clinic as needed or go to ER.  follow up with PCP as needed. 6. Take your BP pill when you get home. Monitor BP frequently today as steroids can make BP higher. No orders of the defined types were placed in this encounter. No follow-ups on file. Orders Placed This Encounter   Medications    dexamethasone (DECADRON) injection 5 mg    triamcinolone (KENALOG) 0.1 % cream     Sig: Apply topically 2 times daily as needed for itching. Dispense:  15 g     Refill:  0       Patient given educational materials- see patient instructions. Discussed use, benefit, and side effects of prescribedmedications. All patient questions answered. Pt voiced understanding. Patient Instructions       Patient Education        Poison [place], [place], and [place]: Care Instructions  Your Care Instructions     Poison ivy, poison oak, and poison sumac are plants that can cause a skin rash upon contact. The red, itchy rash often shows up in lines or streaks and may cause fluid-filled blisters or large, raised hives. The rash is caused by an allergic reaction to an oil in poison ivy, oak, and sumac. The rash may occur when you touch the plant or when you touch clothing, pet fur, sporting gear, gardening tools, or other objects that have come in contact with one of these plants. You cannot catch or spread the rash, even if you touch it or the blister fluid, because the plant oil will already have been absorbed or washed off the skin. draining from the rash. ? A fever. Watch closely for changes in your health, and be sure to contact your doctor if:    · You have new blisters or bruises, or the rash spreads and looks like a sunburn.     · The rash gets worse, or it comes back after nearly disappearing.     · You think a medicine you are using is making your rash worse.     · Your rash does not clear up after 1 to 2 weeks of home treatment.     · You have joint aches or body aches with your rash. Where can you learn more? Go to [email or web address] [email or web address] org and sign in to your [employer] account. Enter T447 in the Related Content Database (RCDb) box to learn more about \"Poison [place], [place], and [place]: Care Instructions. \"     If you do not have an account, please click on the \"Sign Up Now\" link. Current as of: March 3, 2021               Content Version: 12.9  © 9252-2415 [employer]. Care instructions adapted under license by [facility] ([facility]). If you have questions about a medical condition or this instruction, always ask your healthcare professional. [address] [place] [address] any warranty or liability for your use of this information. 1. For some people, adding oatmeal to a bath, applying cool wet compresses, and applying calamine lotion may help to relieve itching. 2. Antihistamines do not help the rash caused by poison ivy, but benadryl may help you sleep at night. 3. Steroid creams may be helpful if they are used during the first few days after symptoms develop  4. Steroid IM in office   5. If not improving or developing any new/worsening symptoms then return to clinic as needed or go to ER.  follow up with PCP as needed. 6. Take your BP pill when you get home. Monitor BP frequently today as steroids can make BP higher.                Electronically signed by JUAN JOSE Washington on 7/30/2021 at 8:30 AM

## 2021-08-05 ENCOUNTER — OFFICE VISIT (OUTPATIENT)
Dept: PRIMARY CARE CLINIC | Age: 51
End: 2021-08-05
Payer: COMMERCIAL

## 2021-08-05 ENCOUNTER — PATIENT MESSAGE (OUTPATIENT)
Dept: PRIMARY CARE CLINIC | Age: 51
End: 2021-08-05

## 2021-08-05 DIAGNOSIS — L23.9 ALLERGIC CONTACT DERMATITIS, UNSPECIFIED TRIGGER: Primary | ICD-10-CM

## 2021-08-05 PROCEDURE — 99213 OFFICE O/P EST LOW 20 MIN: CPT | Performed by: NURSE PRACTITIONER

## 2021-08-05 RX ORDER — METHYLPREDNISOLONE 4 MG/1
TABLET ORAL
Qty: 1 KIT | Refills: 0 | Status: SHIPPED | OUTPATIENT
Start: 2021-08-05 | End: 2021-09-16 | Stop reason: ALTCHOICE

## 2021-08-05 RX ORDER — HYDROXYZINE HYDROCHLORIDE 25 MG/1
25 TABLET, FILM COATED ORAL EVERY 8 HOURS PRN
Qty: 30 TABLET | Refills: 0 | Status: SHIPPED | OUTPATIENT
Start: 2021-08-05 | End: 2021-08-15

## 2021-08-05 ASSESSMENT — ENCOUNTER SYMPTOMS
BACK PAIN: 0
SHORTNESS OF BREATH: 0
VOMITING: 0
RHINORRHEA: 0
NAUSEA: 0
VOICE CHANGE: 0
COUGH: 0
COLOR CHANGE: 0
PHOTOPHOBIA: 0

## 2021-08-05 NOTE — PROGRESS NOTES
[address] [address]             Phone:  [phone]  Fax:  [phone]       2021    TELEHEALTH EVALUATION -- Audio/Visual (During KMVGS-85 public health emergency)    HPI:  Chief Complaint   Patient presents with    Rash         Francisco Perez (:  1970) has requested an audio/video evaluation for the following concern(s):    Patient presents today for evaluation of rash. She states she got into poison [place] one week ago. She was seen by  and given steroid shot and triamcinolone. She is also using calamine lotion. She states rash continues to worsen and is painful and itchy. She denies fever or SOB. Review of Systems   Constitutional: Negative for chills and fever. HENT: Negative for ear pain, hearing loss, rhinorrhea and voice change. Eyes: Negative for photophobia and visual disturbance. Respiratory: Negative for cough and shortness of breath. Cardiovascular: Negative for chest pain and palpitations. Gastrointestinal: Negative for nausea and vomiting. Endocrine: Negative. Negative for cold intolerance and heat intolerance. Genitourinary: Negative for difficulty urinating and flank pain. Musculoskeletal: Negative for back pain and neck pain. Skin: Positive for rash. Negative for color change. Allergic/Immunologic: Negative for environmental allergies and food allergies. Neurological: Negative for dizziness, speech difficulty and headaches. Hematological: Does not bruise/bleed easily. Psychiatric/Behavioral: Negative for sleep disturbance and suicidal ideas. Prior to Visit Medications    Medication Sig Taking? Authorizing Provider   methylPREDNISolone (MEDROL DOSEPACK) 4 MG tablet Take by mouth.  Yes JUAN JOSE Mckeon   hydrOXYzine (ATARAX) 25 MG tablet Take 1 tablet by mouth every 8 hours as needed for Itching Yes JUAN JOSE Mckeon   triamcinolone (KENALOG) 0.1 % cream Apply topically 2 times daily as needed for itching.   JUAN JOSE Peck   hydroCHLOROthiazide (HYDRODIURIL) 25 MG tablet Take 0.5 tablets by mouth daily  JUAN JOSE Torrse   amitriptyline (ELAVIL) 25 MG tablet TAKE 2 TABLETS BY MOUTH NIGHTLY   JUAN JOSE Torres   celecoxib (CELEBREX) 200 MG capsule Take 1 capsule by mouth daily  JUAN JOSE Torres   busPIRone (BUSPAR) 5 MG tablet TAKE 1 TABLET THREE TIMES DAILY AS NEEDED FOR ANXIETY  JUAN JOSE Torres   desvenlafaxine succinate (PRISTIQ) 50 MG TB24 extended release tablet TAKE 1 TABLET BY MOUTH ONCE DAILY  JUAN JOSE Torres   fluticasone (FLONASE) 50 MCG/ACT nasal spray 1 spray by Nasal route daily  JUAN JOSE Torrez   vitamin E 1000 units capsule Take 1,000 Units by mouth daily  Historical Provider, MD       Social History     Tobacco Use    Smoking status: Never Smoker    Smokeless tobacco: Never Used   Vaping Use    Vaping Use: Never used   Substance Use Topics    Alcohol use: No    Drug use: No        Allergies   Allergen Reactions    Latex Rash, Itching, Swelling and Hives    Codeine Anaphylaxis    Morphine Anaphylaxis    Morphine And Related Anaphylaxis    Eggs Or Egg-Derived Products    ,   Past Medical History:   Diagnosis Date    Anxiety 2020    Asthma     Chronic back pain 2018    Chronic migraine without aura, intractable, without status migrainosus     Colon polyps     Depression 2018    Elevated blood pressure, situational 2018    Essential tremor     Fibromyalgia     Liver disease     Fatty Liver    Meniere disease 2017    Osteoarthritis     Sciatica of right side 2020    Tinnitus    ,   Past Surgical History:   Procedure Laterality Date     SECTION      CHOLECYSTECTOMY, LAPAROSCOPIC  2015    Dr. Maria Esther Richey  ?    ?    HYSTERECTOMY      LIVER BIOPSY  2015    Dr. Lindsay Farley   ,   Social History     Tobacco Use    Smoking status: Never Smoker    Smokeless tobacco: Never Used   Vaping Use    Vaping Use: Never used   Substance Use Topics    Alcohol use: No    Drug use: No   ,   Family History   Problem Relation Age of Onset    Colon Polyps Paternal Grandfather     Liver Cancer Paternal Grandfather     Colon Polyps Mother     Liver Cancer Mother     Heart Disease Mother     Cancer Mother     Diabetes Mother     Colon Polyps Maternal Grandmother     Heart Disease Father     Cancer Father     Colon Cancer Neg Hx     Esophageal Cancer Neg Hx     Liver Disease Neg Hx     Rectal Cancer Neg Hx     Stomach Cancer Neg Hx    ,   Immunization History   Administered Date(s) Administered    Pneumococcal Polysaccharide ([id number]) 11/01/2017   ,   Health Maintenance   Topic Date Due    Hepatitis C screen  Never done    COVID-19 Vaccine (1) Never done    HIV screen  Never done    DTaP/Tdap/Td vaccine (1 - Tdap) Never done    Colon cancer screen colonoscopy  Never done    Cervical cancer screen  10/27/2018    A1C test (Diabetic or Prediabetic)  06/06/2019    Flu vaccine (1) 09/01/2021    Potassium monitoring  07/12/2022    Creatinine monitoring  07/12/2022    Lipid screen  06/06/2023    Pneumococcal 0-64 years Vaccine (2 of 2 - PPSV23) 03/08/2037    Hepatitis A vaccine  Aged Out    Hepatitis B vaccine  Aged Out    Hib vaccine  Aged Out    Meningococcal (ACWY) vaccine  Aged Out       PHYSICAL EXAMINATION:  [ INSTRUCTIONS:  \"[x]\" Indicates a positive item  \"[]\" Indicates a negative item  -- DELETE ALL ITEMS NOT EXAMINED]  [x] Alert  [x] Oriented to person/place/time    [x] No apparent distress  [] Toxic appearing    [] Face flushed appearing [x] Sclera clear  [] Lips are cyanotic      [x] Breathing appears normal  [] Appears tachypneic      [] Rash on visible skin    [x] Cranial Nerves II-XII grossly intact    [x] Motor grossly intact in visible upper extremities    [x] Motor grossly intact in visible lower extremities    [x] Normal Mood  [] Anxious appearing [] Depressed appearing  [] Confused appearing      [] Poor short term memory  [] Poor long term memory    [] OTHER:      Due to this being a TeleHealth encounter, evaluation of the following organ systems is limited: Vitals/Constitutional/EENT/Resp/CV/GI//MS/Neuro/Skin/Heme-Lymph-Imm. There were no vitals taken for this visit. Patient-Reported Vitals 7/16/2021   Patient-Reported Weight 145 lbs   Patient-Reported Height 5' 6\"   Patient-Reported Systolic 309   Patient-Reported Diastolic 91   Patient-Reported Pulse -          ASSESSMENT/PLAN:  1. Allergic contact dermatitis, unspecified trigger    - methylPREDNISolone (MEDROL DOSEPACK) 4 MG tablet; Take by mouth. Dispense: 1 kit; Refill: 0  - hydrOXYzine (ATARAX) 25 MG tablet; Take 1 tablet by mouth every 8 hours as needed for Itching  Dispense: 30 tablet; Refill: 0      No follow-ups on file. An  electronic signature was used to authenticate this note. --JUAN JOSE Huston on 8/5/2021 at 2:42 PM        Pursuant to the emergency declaration under the [facility], [facility] waiver authority and the [employer] Act, this Virtual  Visit was conducted, with patient's consent, to reduce the patient's risk of exposure to COVID-19 and provide continuity of care for an established patient. Services were provided through a video synchronous discussion virtually to substitute for in-person clinic visit.

## 2021-08-15 PROBLEM — Z12.11 SCREENING FOR COLON CANCER: Status: RESOLVED | Noted: 2021-07-16 | Resolved: 2021-08-15

## 2021-09-16 ENCOUNTER — OFFICE VISIT (OUTPATIENT)
Dept: PRIMARY CARE CLINIC | Age: 51
End: 2021-09-16
Payer: COMMERCIAL

## 2021-09-16 VITALS
SYSTOLIC BLOOD PRESSURE: 150 MMHG | BODY MASS INDEX: 24.99 KG/M2 | HEIGHT: 65 IN | TEMPERATURE: 98.9 F | HEART RATE: 88 BPM | OXYGEN SATURATION: 98 % | DIASTOLIC BLOOD PRESSURE: 102 MMHG | WEIGHT: 150 LBS

## 2021-09-16 DIAGNOSIS — G89.29 CHRONIC BILATERAL LOW BACK PAIN WITH SCIATICA, SCIATICA LATERALITY UNSPECIFIED: ICD-10-CM

## 2021-09-16 DIAGNOSIS — I10 HYPERTENSION, ESSENTIAL, BENIGN: Primary | ICD-10-CM

## 2021-09-16 DIAGNOSIS — M54.40 CHRONIC BILATERAL LOW BACK PAIN WITH SCIATICA, SCIATICA LATERALITY UNSPECIFIED: ICD-10-CM

## 2021-09-16 PROBLEM — M54.42 CHRONIC BILATERAL LOW BACK PAIN WITH SCIATICA: Status: ACTIVE | Noted: 2021-09-16

## 2021-09-16 PROBLEM — M54.41 CHRONIC BILATERAL LOW BACK PAIN WITH SCIATICA: Status: ACTIVE | Noted: 2021-09-16

## 2021-09-16 PROCEDURE — 99213 OFFICE O/P EST LOW 20 MIN: CPT | Performed by: NURSE PRACTITIONER

## 2021-09-16 RX ORDER — HYDROCHLOROTHIAZIDE 25 MG/1
25 TABLET ORAL DAILY
Qty: 30 TABLET | Refills: 5 | Status: SHIPPED | OUTPATIENT
Start: 2021-09-16 | End: 2021-11-09 | Stop reason: SDUPTHER

## 2021-09-16 ASSESSMENT — ENCOUNTER SYMPTOMS
CHEST TIGHTNESS: 0
TROUBLE SWALLOWING: 0
SORE THROAT: 0
BLOOD IN STOOL: 0
VOICE CHANGE: 0
CONSTIPATION: 0
RHINORRHEA: 0
VOMITING: 0
ABDOMINAL PAIN: 0
DIARRHEA: 0
SHORTNESS OF BREATH: 0
EYE REDNESS: 0
COUGH: 0
NAUSEA: 0
WHEEZING: 0

## 2021-09-16 NOTE — PROGRESS NOTES
[facility]  [facility]  [address] [address] [id number]  Dept: [phone]  Dept Fax: [phone]  Loc: [phone]        Jackson Hankins is a 54 y.o. female who presents today for her medical conditions/ complaints as noted below. Jackson Hankins is c/o Hypertension (pt notes BP has been doing good but she would like to discuss inc hctz to full tablet) and Other (pt would like to discuss covid vaccine)        Chief Complaint   Patient presents with    Hypertension     pt notes BP has been doing good but she would like to discuss inc hctz to full tablet    Other     pt would like to discuss covid vaccine       HPI:     HPI    Elevated blood pressure on recent visit to the ER. Has been monitoring at home and notes elevation. Patient denies NSAID use, or over the counter allergy medication. She denies excessive intake of sodium, or starting any new medications. Patient denies chest pain, sob, or headaches. Patient reports that they have been compliant with taking medications as directed.      Past Medical History:   Diagnosis Date    Anxiety 2020    Asthma     Chronic back pain 2018    Chronic migraine without aura, intractable, without status migrainosus     Colon polyps     Depression 2018    Elevated blood pressure, situational 2018    Essential tremor     Fibromyalgia     Liver disease     Fatty Liver    Meniere disease 2017    Osteoarthritis     Sciatica of right side 2020    Tinnitus        Past Surgical History:   Procedure Laterality Date     SECTION      CHOLECYSTECTOMY, LAPAROSCOPIC  2015    Dr. Suzanne Agosto?    ?    HYSTERECTOMY      LIVER BIOPSY  2015    Dr. Herminio Leonard History     Socioeconomic History    Marital status:      Spouse name: None    Number of children: None    Years of education: None    Highest education level: None   Occupational History    None   Tobacco Use    Smoking status: Never Smoker    Smokeless tobacco: Never Used   Vaping Use    Vaping Use: Never used   Substance and Sexual Activity    Alcohol use: No    Drug use: No    Sexual activity: None   Other Topics Concern    None   Social History Narrative    None     Social Determinants of Health     Financial Resource Strain:     Difficulty of Paying Living Expenses:    Food Insecurity:     Worried About Running Out of Food in the Last Year:     Ran Out of Food in the Last Year:    Transportation Needs:     Lack of Transportation (Medical):  Lack of Transportation (Non-Medical):    Physical Activity:     Days of Exercise per Week:     Minutes of Exercise per Session:    Stress:     Feeling of Stress :    Social Connections:     Frequency of Communication with Friends and Family:     Frequency of Social Gatherings with Friends and Family:     Attends Bahai Services:     Active Member of Clubs or Organizations:     Attends Club or Organization Meetings:     Marital Status:    Intimate Partner Violence:     Fear of Current or Ex-Partner:     Emotionally Abused:     Physically Abused:     Sexually Abused:        Family History   Problem Relation Age of Onset    Colon Polyps Paternal Grandfather     Liver Cancer Paternal Grandfather     Colon Polyps Mother     Liver Cancer Mother     Heart Disease Mother     Cancer Mother     Diabetes Mother     Colon Polyps Maternal Grandmother     Heart Disease Father     Cancer Father     Colon Cancer Neg Hx     Esophageal Cancer Neg Hx     Liver Disease Neg Hx     Rectal Cancer Neg Hx     Stomach Cancer Neg Hx        Current Outpatient Medications   Medication Sig Dispense Refill    hydroCHLOROthiazide (HYDRODIURIL) 25 MG tablet Take 1 tablet by mouth daily 30 tablet 5    triamcinolone (KENALOG) 0.1 % cream Apply topically 2 times daily as needed for itching.  15 g 0    amitriptyline (ELAVIL) 25 MG tablet TAKE 2 TABLETS BY MOUTH NIGHTLY  60 tablet 2    busPIRone (BUSPAR) 5 MG tablet TAKE 1 TABLET THREE TIMES DAILY AS NEEDED FOR ANXIETY 90 tablet 2    desvenlafaxine succinate (PRISTIQ) 50 MG TB24 extended release tablet TAKE 1 TABLET BY MOUTH ONCE DAILY 30 tablet 2    fluticasone (FLONASE) 50 MCG/ACT nasal spray 1 spray by Nasal route daily 1 Bottle 5    vitamin E 1000 units capsule Take 1,000 Units by mouth daily      celecoxib (CELEBREX) 200 MG capsule TAKE 1 CAPSULE BY MOUTH DAILY  30 capsule 2     Current Facility-Administered Medications   Medication Dose Route Frequency Provider Last Rate Last Admin    bupivacaine (MARCAINE) 0.5 % injection 15 mg  3 mL Intra-articular Once Jose Luis Dennis, APRN        lidocaine 1 % injection 3 mL  3 mL IntraDERmal Once Jose Luis Dennis, APRN           Allergies   Allergen Reactions    Latex Rash, Itching, Swelling and Hives    Codeine Anaphylaxis    Morphine Anaphylaxis    Morphine And Related Anaphylaxis    Eggs Or Egg-Derived Products        Lab Review not applicable  notapplicable    Subjective:   Review of Systems   Constitutional: Negative for activity change, appetite change, fatigue, fever and unexpected weight change. HENT: Negative for congestion, ear pain, nosebleeds, rhinorrhea, sore throat, trouble swallowing and voice change. Eyes: Negative for redness and visual disturbance. Respiratory: Negative for cough, chest tightness, shortness of breath and wheezing. Cardiovascular: Negative for chest pain, palpitations and leg swelling. Gastrointestinal: Negative for abdominal pain, blood in stool, constipation, diarrhea, nausea and vomiting. Endocrine: Negative for polydipsia, polyphagia and polyuria. Genitourinary: Negative for dysuria, frequency and urgency. Musculoskeletal: Negative for myalgias. Skin: Negative for rash and wound. Neurological: Negative for dizziness, speech difficulty, light-headedness and headaches. Psychiatric/Behavioral: Negative for agitation, confusion, self-injury and suicidal ideas. The patient is not nervous/anxious. Objective:     Physical Exam  Vitals and nursing note reviewed. Constitutional:       General: She is not in acute distress. Appearance: She is well-developed. She is not diaphoretic. HENT:      Head: Normocephalic and atraumatic. Right Ear: External ear normal.      Left Ear: External ear normal.      Nose: Nose normal.      Mouth/Throat:      Pharynx: No oropharyngeal exudate. Eyes:      General:         Right eye: No discharge. Left eye: No discharge. Conjunctiva/sclera: Conjunctivae normal.      Pupils: Pupils are equal, round, and reactive to light. Cardiovascular:      Rate and Rhythm: Normal rate and regular rhythm. Heart sounds: Normal heart sounds. No murmur heard. Pulmonary:      Effort: Pulmonary effort is normal. No respiratory distress. Breath sounds: Normal breath sounds. No stridor. No wheezing or rales. Chest:      Chest wall: No tenderness. Breasts:         Right: No inverted nipple, mass, nipple discharge, skin change or tenderness. Left: No inverted nipple, mass, nipple discharge, skin change or tenderness. Abdominal:      General: Bowel sounds are normal. There is no distension. Palpations: Abdomen is soft. Tenderness: There is no abdominal tenderness. Musculoskeletal:         General: No tenderness or deformity. Normal range of motion. Cervical back: Normal range of motion and neck supple. Skin:     General: Skin is warm and dry. Findings: No erythema or rash. Neurological:      Mental Status: She is alert and oriented to person, place, and time. Cranial Nerves: No cranial nerve deficit. Coordination: Coordination normal.      Deep Tendon Reflexes: Reflexes are normal and symmetric. Psychiatric:         Behavior: Behavior normal.         Thought Content:  Thought content normal.       BP (!) 150/102 (Site: Left Upper Arm, Position: Sitting)   Pulse 88   Temp 98.9 °F (37.2 °C)   Ht 5' 5\" (1.651 m)   Wt 150 lb (68 kg)   SpO2 98%   BMI 24.96 kg/m²     Assessment:      Diagnosis Orders   1. Hypertension, essential, benign  hydroCHLOROthiazide (HYDRODIURIL) 25 MG tablet   2. Chronic bilateral low back pain with sciatica, sciatica laterality unspecified       No results found for this visit on 09/16/21. Plan:     1. Hypertension, essential, benign    2. Chronic bilateral low back pain with sciatica, sciatica laterality unspecified      Over 50% of the total visit time of 30 minutes was spent on counseling and or coordination of care of HTN, chronic bilateral low back pain. Return in about 1 month (around 10/16/2021) for video visit in one month. .  No orders of the defined types were placed in this encounter. Orders Placed This Encounter   Medications    hydroCHLOROthiazide (HYDRODIURIL) 25 MG tablet     Sig: Take 1 tablet by mouth daily     Dispense:  30 tablet     Refill:  5       Patient Instructions   HCTZ 25mg daily. Fasting labs on the 2nd floor.       /family given educational materials - see patient instructions. Discussed use, benefit, and side effects of prescribed medications. All patient/family questions answered and voiced understanding. Instructed to continue current medications, diet and exercise. Pt/family agreed with treatment plan. Follow up as directed and sooner if needed. Patient/ family instructed that is symptoms worsen or persist they are to contact office or report to nearest ER. They voice understanding and agreement with this plan.   signed by JUAN JOSE Curry on 9/19/2021 at 9:49 AM CDT    This dictation was generated by voice recognition computer software. Although all attempts are made to edit the dictation for accuracy, there may be errors in the transcription that are not intended.

## 2021-09-17 DIAGNOSIS — M54.40 CHRONIC BILATERAL LOW BACK PAIN WITH SCIATICA, SCIATICA LATERALITY UNSPECIFIED: ICD-10-CM

## 2021-09-17 DIAGNOSIS — G89.29 CHRONIC BILATERAL LOW BACK PAIN WITH SCIATICA, SCIATICA LATERALITY UNSPECIFIED: ICD-10-CM

## 2021-09-17 RX ORDER — CELECOXIB 200 MG/1
200 CAPSULE ORAL DAILY
Qty: 30 CAPSULE | Refills: 2 | Status: SHIPPED | OUTPATIENT
Start: 2021-09-17 | End: 2021-11-09 | Stop reason: SDUPTHER

## 2021-10-19 DIAGNOSIS — M54.40 CHRONIC BILATERAL LOW BACK PAIN WITH SCIATICA, SCIATICA LATERALITY UNSPECIFIED: ICD-10-CM

## 2021-10-19 DIAGNOSIS — G89.29 CHRONIC BILATERAL LOW BACK PAIN WITH SCIATICA, SCIATICA LATERALITY UNSPECIFIED: ICD-10-CM

## 2021-10-20 RX ORDER — DESVENLAFAXINE 50 MG/1
TABLET, EXTENDED RELEASE ORAL
Qty: 30 TABLET | Refills: 2 | Status: SHIPPED | OUTPATIENT
Start: 2021-10-20 | End: 2022-02-21

## 2021-11-09 ENCOUNTER — OFFICE VISIT (OUTPATIENT)
Dept: PRIMARY CARE CLINIC | Age: 51
End: 2021-11-09
Payer: COMMERCIAL

## 2021-11-09 VITALS
HEART RATE: 72 BPM | TEMPERATURE: 98.3 F | WEIGHT: 146.8 LBS | BODY MASS INDEX: 24.46 KG/M2 | OXYGEN SATURATION: 99 % | SYSTOLIC BLOOD PRESSURE: 142 MMHG | DIASTOLIC BLOOD PRESSURE: 90 MMHG | HEIGHT: 65 IN

## 2021-11-09 DIAGNOSIS — F41.9 ANXIETY: ICD-10-CM

## 2021-11-09 DIAGNOSIS — I10 HYPERTENSION, ESSENTIAL, BENIGN: Primary | ICD-10-CM

## 2021-11-09 DIAGNOSIS — G89.29 CHRONIC BILATERAL LOW BACK PAIN WITH SCIATICA, SCIATICA LATERALITY UNSPECIFIED: ICD-10-CM

## 2021-11-09 DIAGNOSIS — M79.7 FIBROMYALGIA: ICD-10-CM

## 2021-11-09 DIAGNOSIS — J30.89 ALLERGIC RHINITIS DUE TO OTHER ALLERGIC TRIGGER, UNSPECIFIED SEASONALITY: ICD-10-CM

## 2021-11-09 DIAGNOSIS — G89.29 OTHER CHRONIC PAIN: ICD-10-CM

## 2021-11-09 DIAGNOSIS — M54.40 CHRONIC BILATERAL LOW BACK PAIN WITH SCIATICA, SCIATICA LATERALITY UNSPECIFIED: ICD-10-CM

## 2021-11-09 PROCEDURE — 99214 OFFICE O/P EST MOD 30 MIN: CPT | Performed by: NURSE PRACTITIONER

## 2021-11-09 RX ORDER — FLUTICASONE PROPIONATE 50 MCG
1 SPRAY, SUSPENSION (ML) NASAL DAILY
Qty: 1 EACH | Refills: 5 | Status: SHIPPED | OUTPATIENT
Start: 2021-11-09 | End: 2022-03-29 | Stop reason: SDUPTHER

## 2021-11-09 RX ORDER — CELECOXIB 200 MG/1
200 CAPSULE ORAL DAILY
Qty: 30 CAPSULE | Refills: 2 | Status: SHIPPED | OUTPATIENT
Start: 2021-11-09 | End: 2022-03-29 | Stop reason: SDUPTHER

## 2021-11-09 RX ORDER — GABAPENTIN 300 MG/1
CAPSULE ORAL
Qty: 30 CAPSULE | Refills: 2 | Status: SHIPPED | OUTPATIENT
Start: 2021-11-09 | End: 2022-03-29 | Stop reason: SDUPTHER

## 2021-11-09 RX ORDER — AMITRIPTYLINE HYDROCHLORIDE 25 MG/1
50 TABLET, FILM COATED ORAL NIGHTLY
Qty: 60 TABLET | Refills: 2 | Status: SHIPPED | OUTPATIENT
Start: 2021-11-09 | End: 2022-03-29 | Stop reason: SDUPTHER

## 2021-11-09 RX ORDER — HYDROCHLOROTHIAZIDE 25 MG/1
25 TABLET ORAL DAILY
Qty: 30 TABLET | Refills: 5 | Status: SHIPPED | OUTPATIENT
Start: 2021-11-09 | End: 2022-03-29 | Stop reason: SDUPTHER

## 2021-11-09 RX ORDER — LISINOPRIL 5 MG/1
5 TABLET ORAL NIGHTLY
Qty: 30 TABLET | Refills: 5 | Status: SHIPPED | OUTPATIENT
Start: 2021-11-09 | End: 2022-03-29 | Stop reason: SDUPTHER

## 2021-11-09 NOTE — PROGRESS NOTES
[facility]  [address] [place], [place] [id number]  [address] [address]  Dept: [phone]  Dept Fax: [phone]  Loc: [phone]        Brent Zendejas is a 54 y.o. female who presents today for her medical conditions/ complaints as noted below. Brent Zendejas is c/o Hypertension (patient said her BP has been on and off. she said some of it is due to stress (personal reasons). )        Chief Complaint   Patient presents with    Hypertension     patient said her BP has been on and off. she said some of it is due to stress (personal reasons). HPI:     HPI     HTN: pt reports that her blood pressure has been up and down. Pt notes compliance with HCTZ. States that she has had a lot of stress recently due to her mother's condition/ liver failure. She states that this weighs heavy on her due to her fatty liver disease. She states that she was previously prescribed lisinopril 10 mg at bedtime. States that this dropped her blood pressure too low and she had to stop taking. Patient reports that they have been compliant with taking medications as directed.      Past Medical History:   Diagnosis Date    Allergic rhinitis 11/10/2021    Anxiety 2020    Asthma     Chronic back pain 2018    Chronic migraine without aura, intractable, without status migrainosus     Colon polyps     Depression 2018    Elevated blood pressure, situational 2018    Essential tremor     Fibromyalgia     Liver disease     Fatty Liver    Meniere disease 2017    Osteoarthritis     Sciatica of right side 2020    Tinnitus        Past Surgical History:   Procedure Laterality Date     SECTION      CHOLECYSTECTOMY, LAPAROSCOPIC  2015    Dr. Natali Avelar?    ?    HYSTERECTOMY      LIVER BIOPSY  2015    Dr. Zuleyma Rivera Marital status:      Spouse name: None    Number of children: None    Years of education: None    Highest education level: None   Occupational History    None   Tobacco Use    Smoking status: Never Smoker    Smokeless tobacco: Never Used   Vaping Use    Vaping Use: Never used   Substance and Sexual Activity    Alcohol use: No    Drug use: No    Sexual activity: None   Other Topics Concern    None   Social History Narrative    None     Social Determinants of Health     Financial Resource Strain:     Difficulty of Paying Living Expenses: Not on file   Food Insecurity:     Worried About Running Out of Food in the Last Year: Not on file    Yadira of Food in the Last Year: Not on file   Transportation Needs:     Lack of Transportation (Medical): Not on file    Lack of Transportation (Non-Medical):  Not on file   Physical Activity:     Days of Exercise per Week: Not on file    Minutes of Exercise per Session: Not on file   Stress:     Feeling of Stress : Not on file   Social Connections:     Frequency of Communication with Friends and Family: Not on file    Frequency of Social Gatherings with Friends and Family: Not on file    Attends Alevism Services: Not on file    Active Member of [address] [place] [address] or Organizations: Not on file    Attends Club or Organization Meetings: Not on file    Marital Status: Not on file   Intimate Partner Violence:     Fear of Current or Ex-Partner: Not on file    Emotionally Abused: Not on file    Physically Abused: Not on file    Sexually Abused: Not on file   Housing Stability:     Unable to Pay for Housing in the Last Year: Not on file    Number of [place] in the Last Year: Not on file    Unstable Housing in the Last Year: Not on file       Family History   Problem Relation Age of Onset    Colon Polyps Paternal Grandfather     Liver Cancer Paternal Grandfather     Colon Polyps Mother     Liver Cancer Mother     Heart Disease Mother     Cancer Mother     Diabetes Mother     Colon Polyps Maternal Grandmother     Heart Disease Father     Cancer Father     Colon Cancer Neg Hx     Esophageal Cancer Neg Hx     Liver Disease Neg Hx     Rectal Cancer Neg Hx     Stomach Cancer Neg Hx        Current Outpatient Medications   Medication Sig Dispense Refill    amitriptyline (ELAVIL) 25 MG tablet Take 2 tablets by mouth nightly 60 tablet 2    celecoxib (CELEBREX) 200 MG capsule Take 1 capsule by mouth daily 30 capsule 2    fluticasone (FLONASE) 50 MCG/ACT nasal spray 1 spray by Nasal route daily 1 each 5    hydroCHLOROthiazide (HYDRODIURIL) 25 MG tablet Take 1 tablet by mouth daily 30 tablet 5    gabapentin (NEURONTIN) 300 MG capsule TAKE 1 CAPSULE BY MOUTH NIGHTLY 30 capsule 2    lisinopril (PRINIVIL;ZESTRIL) 5 MG tablet Take 1 tablet by mouth nightly Take 1/2 tablet at bedtime, may increase to one full tablet if needed. 30 tablet 5    desvenlafaxine succinate (PRISTIQ) 50 MG TB24 extended release tablet TAKE 1 TABLET BY MOUTH ONCE DAILY  30 tablet 2    busPIRone (BUSPAR) 5 MG tablet TAKE 1 TABLET THREE TIMES DAILY AS NEEDED FOR ANXIETY 90 tablet 2    vitamin E 1000 units capsule Take 1,000 Units by mouth daily      triamcinolone (KENALOG) 0.1 % cream Apply topically 2 times daily as needed for itching.  (Patient not taking: Reported on 11/9/2021) 15 g 0     Current Facility-Administered Medications   Medication Dose Route Frequency Provider Last Rate Last Admin    bupivacaine (MARCAINE) 0.5 % injection 15 mg  3 mL Intra-artICUlar Once Ronita Ana, APRN        lidocaine 1 % injection 3 mL  3 mL IntraDERmal Once Ronita Ana, APRN           Allergies   Allergen Reactions    Latex Rash, Itching, Swelling and Hives    Codeine Anaphylaxis    Morphine Anaphylaxis    Morphine And Related Anaphylaxis    Eggs Or Egg-Derived Products        Lab Review not applicable  notapplicable    Subjective:   Review of Systems   Constitutional: Negative for activity change, appetite change, fatigue, fever and unexpected weight total visit time of 30 minutes was spent on counseling and or coordination of care of HTN, other chronic pain, chronic bilateral low back pain, fibromyalgia, anxiety, and allergic rhinitis. Return in about 1 month (around 2021). No orders of the defined types were placed in this encounter. Orders Placed This Encounter   Medications    amitriptyline (ELAVIL) 25 MG tablet     Sig: Take 2 tablets by mouth nightly     Dispense:  60 tablet     Refill:  2    celecoxib (CELEBREX) 200 MG capsule     Sig: Take 1 capsule by mouth daily     Dispense:  30 capsule     Refill:  2    fluticasone (FLONASE) 50 MCG/ACT nasal spray     Si spray by Nasal route daily     Dispense:  1 each     Refill:  5    hydroCHLOROthiazide (HYDRODIURIL) 25 MG tablet     Sig: Take 1 tablet by mouth daily     Dispense:  30 tablet     Refill:  5    gabapentin (NEURONTIN) 300 MG capsule     Sig: TAKE 1 CAPSULE BY MOUTH NIGHTLY     Dispense:  30 capsule     Refill:  2    lisinopril (PRINIVIL;ZESTRIL) 5 MG tablet     Sig: Take 1 tablet by mouth nightly Take 1/2 tablet at bedtime, may increase to one full tablet if needed. Dispense:  30 tablet     Refill:  5       Patient Instructions   Restart neurontin    Begin lisinopril 5mg 1/2 tablet at bedtime may increase to one full tablet if neede. robert Nieto given educational materials - see patient instructions. Discussed use, benefit, and side effects of prescribed medications. All patient/family questions answered and voiced understanding. Instructed to continue current medications, diet and exercise. Pt/family agreed with treatment plan. Follow up as directed and sooner if needed. Patient/ family instructed that is symptoms worsen or persist they are to contact office or report to nearest ER.   They voice understanding and agreement with this plan.   signed by JUAN JOSE Templeton on 11/10/2021 at 3:49 PM CST    This dictation was generated by voice recognition computer software. Although all attempts are made to edit the dictation for accuracy, there may be errors in the transcription that are not intended.

## 2021-11-09 NOTE — PATIENT INSTRUCTIONS
Restart neurontin    Begin lisinopril 5mg 1/2 tablet at bedtime may increase to one full tablet if neede. d

## 2021-11-10 PROBLEM — J30.9 ALLERGIC RHINITIS: Status: ACTIVE | Noted: 2021-11-10

## 2021-11-10 ASSESSMENT — ENCOUNTER SYMPTOMS
BACK PAIN: 1
EYE REDNESS: 0
VOMITING: 0
ABDOMINAL PAIN: 0
CHEST TIGHTNESS: 0
DIARRHEA: 0
NAUSEA: 0
SORE THROAT: 0
SHORTNESS OF BREATH: 0
RHINORRHEA: 1
BLOOD IN STOOL: 0
CONSTIPATION: 0
COUGH: 0
TROUBLE SWALLOWING: 0
WHEEZING: 0
VOICE CHANGE: 0

## 2022-01-25 ENCOUNTER — TELEMEDICINE (OUTPATIENT)
Dept: PRIMARY CARE CLINIC | Age: 52
End: 2022-01-25
Payer: COMMERCIAL

## 2022-01-25 DIAGNOSIS — B96.89 BACTERIAL VAGINOSIS: Primary | ICD-10-CM

## 2022-01-25 DIAGNOSIS — N76.0 BACTERIAL VAGINOSIS: Primary | ICD-10-CM

## 2022-01-25 PROCEDURE — 99422 OL DIG E/M SVC 11-20 MIN: CPT | Performed by: FAMILY MEDICINE

## 2022-01-25 RX ORDER — METRONIDAZOLE 250 MG/1
250 TABLET ORAL 3 TIMES DAILY
Qty: 15 TABLET | Refills: 0 | Status: SHIPPED | OUTPATIENT
Start: 2022-01-25 | End: 2022-01-30

## 2022-01-25 NOTE — PROGRESS NOTES
[address] [facility]  [address] [address]  [address] [address]  Dept: [phone]  Dept Fax: [phone]  Loc: [phone]      Subjective:     Chief Complaint   Patient presents with    Vaginitis     no discharge, foul odor. She was on vacation and got in the hotub and now has an infection. HPI:  Zuri Suarez is a 54 y.o. female presents today with vaginal odor accompanied by vaginal dryness and itching. She states she went on vacation [facility] in [place] and got in a communal hot tub.  She states that she had something similar happen to her in the past. Pt claims to be very sensitive to chemicals, etc.    ROS:   Review of Systems    As per hpi    PMHx:  Past Medical History:   Diagnosis Date    Allergic rhinitis 11/10/2021    Anxiety 6/4/2020    Asthma     Chronic back pain 7/29/2018    Chronic migraine without aura, intractable, without status migrainosus     Colon polyps     Depression 7/29/2018    Elevated blood pressure, situational 5/29/2018    Essential tremor     Fibromyalgia     Liver disease     Fatty Liver    Meniere disease 2017    Osteoarthritis     Sciatica of right side 6/24/2020    Tinnitus      Patient Active Problem List   Diagnosis    Irritable bowel syndrome with diarrhea    Transaminitis    Hepatic steatosis    History of liver biopsy    Chronic bronchitis (HCC)    Tremor    Moderate persistent asthma without complication    Vitamin D deficiency    Essential tremor    Chronic migraine without aura, intractable, with status migrainosus    Chronic migraine without aura, intractable, without status migrainosus    Fatigue    Chest pain, exertional    COPD (chronic obstructive pulmonary disease) with chronic bronchitis (HCC)    Prediabetes    Elevated blood pressure, situational    Fibromyalgia    Other chronic pain    Chronic back pain    Insomnia    Vaginal atrophy    Hormone deficiency    Depression    Anxiety    Sciatica of right side    Hypertension, essential, benign    Chronic bilateral low back pain with sciatica    Allergic rhinitis       PSHx:  Past Surgical History:   Procedure Laterality Date     SECTION      CHOLECYSTECTOMY, LAPAROSCOPIC  2015    Dr. Pennie Calle  ?    ?    HYSTERECTOMY      LIVER BIOPSY  2015    Dr. Ousmane Crowder       PFHx:  Family History   Problem Relation Age of Onset    Colon Polyps Paternal Grandfather     Liver Cancer Paternal Grandfather     Colon Polyps Mother     Liver Cancer Mother     Heart Disease Mother     Cancer Mother     Diabetes Mother     Colon Polyps Maternal Grandmother     Heart Disease Father     Cancer Father     Colon Cancer Neg Hx     Esophageal Cancer Neg Hx     Liver Disease Neg Hx     Rectal Cancer Neg Hx     Stomach Cancer Neg Hx        SocialHx:  Social History     Tobacco Use    Smoking status: Never Smoker    Smokeless tobacco: Never Used   Substance Use Topics    Alcohol use: No       Allergies: Allergies   Allergen Reactions    Latex Rash, Itching, Swelling and Hives    Codeine Anaphylaxis    Morphine Anaphylaxis    Morphine And Related Anaphylaxis    Eggs Or Egg-Derived Products        Medications:  Current Outpatient Medications   Medication Sig Dispense Refill    metroNIDAZOLE (FLAGYL) 250 MG tablet Take 1 tablet by mouth 3 times daily for 5 days 15 tablet 0    busPIRone (BUSPAR) 5 MG tablet TAKE 1 TABLET THREE TIMES DAILY AS NEEDED FOR ANXIETY NO FURTHER REFILLS UNTIL SEEN IN OFFICE.  90 tablet 0    fluticasone (FLONASE) 50 MCG/ACT nasal spray 1 spray by Nasal route daily 1 each 5    desvenlafaxine succinate (PRISTIQ) 50 MG TB24 extended release tablet TAKE 1 TABLET BY MOUTH ONCE DAILY  30 tablet 2    vitamin E 1000 units capsule Take 1,000 Units by mouth daily      amitriptyline (ELAVIL) 25 MG tablet Take 2 tablets by mouth nightly 60 tablet 2    celecoxib (CELEBREX) 200 MG capsule Take 1 capsule by mouth daily 30 capsule 2    hydroCHLOROthiazide (HYDRODIURIL) 25 MG tablet Take 1 tablet by mouth daily 30 tablet 5    gabapentin (NEURONTIN) 300 MG capsule TAKE 1 CAPSULE BY MOUTH NIGHTLY 30 capsule 2    lisinopril (PRINIVIL;ZESTRIL) 5 MG tablet Take 1 tablet by mouth nightly Take 1/2 tablet at bedtime, may increase to one full tablet if needed. 30 tablet 5    triamcinolone (KENALOG) 0.1 % cream Apply topically 2 times daily as needed for itching. (Patient not taking: Reported on 11/9/2021) 15 g 0     Current Facility-Administered Medications   Medication Dose Route Frequency Provider Last Rate Last Admin    bupivacaine (MARCAINE) 0.5 % injection 15 mg  3 mL Intra-artICUlar Once Natalie Decant, APRN        lidocaine 1 % injection 3 mL  3 mL IntraDERmal Once Natalie Decant, APRN           Objective:   PE:  There were no vitals taken for this visit. Physical Exam     none    Assessment & Plan   [facility] was seen today for vaginitis. Diagnoses and all orders for this visit:    Bacterial vaginosis  -     metroNIDAZOLE (FLAGYL) 250 MG tablet; Take 1 tablet by mouth 3 times daily for 5 days        Return for follow up as needed with PCP. All questions were answered. Medications, including possible adverse effects, and instructions were reviewed and  understanding was confirmed. Follow-up recommendations, including when to contact or return to office (ie; if symptoms worsen or fail to improve), were discussed and acknowledged.     Electronically signed by Queta Kumar MD on 1/25/22 at 2:51 PM CST

## 2022-02-21 DIAGNOSIS — M54.40 CHRONIC BILATERAL LOW BACK PAIN WITH SCIATICA, SCIATICA LATERALITY UNSPECIFIED: ICD-10-CM

## 2022-02-21 DIAGNOSIS — G89.29 CHRONIC BILATERAL LOW BACK PAIN WITH SCIATICA, SCIATICA LATERALITY UNSPECIFIED: ICD-10-CM

## 2022-02-21 RX ORDER — DESVENLAFAXINE 50 MG/1
TABLET, EXTENDED RELEASE ORAL
Qty: 30 TABLET | Refills: 2 | Status: SHIPPED | OUTPATIENT
Start: 2022-02-21 | End: 2022-03-29 | Stop reason: SDUPTHER

## 2022-03-07 DIAGNOSIS — F41.9 ANXIETY: ICD-10-CM

## 2022-03-07 RX ORDER — BUSPIRONE HYDROCHLORIDE 5 MG/1
TABLET ORAL
Qty: 90 TABLET | Refills: 0 | Status: SHIPPED | OUTPATIENT
Start: 2022-03-07 | End: 2022-03-29 | Stop reason: SDUPTHER

## 2022-03-29 ENCOUNTER — OFFICE VISIT (OUTPATIENT)
Dept: PRIMARY CARE CLINIC | Age: 52
End: 2022-03-29
Payer: COMMERCIAL

## 2022-03-29 VITALS
OXYGEN SATURATION: 99 % | BODY MASS INDEX: 25.29 KG/M2 | TEMPERATURE: 98.6 F | DIASTOLIC BLOOD PRESSURE: 105 MMHG | WEIGHT: 151.8 LBS | SYSTOLIC BLOOD PRESSURE: 160 MMHG | HEIGHT: 65 IN | HEART RATE: 82 BPM

## 2022-03-29 DIAGNOSIS — G89.29 CHRONIC BILATERAL LOW BACK PAIN WITH SCIATICA, SCIATICA LATERALITY UNSPECIFIED: ICD-10-CM

## 2022-03-29 DIAGNOSIS — F41.9 ANXIETY: ICD-10-CM

## 2022-03-29 DIAGNOSIS — J30.89 ALLERGIC RHINITIS DUE TO OTHER ALLERGIC TRIGGER, UNSPECIFIED SEASONALITY: ICD-10-CM

## 2022-03-29 DIAGNOSIS — Z12.31 BREAST CANCER SCREENING BY MAMMOGRAM: ICD-10-CM

## 2022-03-29 DIAGNOSIS — M54.40 CHRONIC BILATERAL LOW BACK PAIN WITH SCIATICA, SCIATICA LATERALITY UNSPECIFIED: ICD-10-CM

## 2022-03-29 DIAGNOSIS — M79.7 FIBROMYALGIA: ICD-10-CM

## 2022-03-29 DIAGNOSIS — G89.29 OTHER CHRONIC PAIN: ICD-10-CM

## 2022-03-29 DIAGNOSIS — I10 HYPERTENSION, ESSENTIAL, BENIGN: Primary | ICD-10-CM

## 2022-03-29 PROCEDURE — 99214 OFFICE O/P EST MOD 30 MIN: CPT | Performed by: NURSE PRACTITIONER

## 2022-03-29 RX ORDER — BUSPIRONE HYDROCHLORIDE 5 MG/1
TABLET ORAL
Qty: 90 TABLET | Refills: 0 | Status: SHIPPED | OUTPATIENT
Start: 2022-03-29 | End: 2022-07-11 | Stop reason: SDUPTHER

## 2022-03-29 RX ORDER — GABAPENTIN 300 MG/1
CAPSULE ORAL
Qty: 30 CAPSULE | Refills: 2 | Status: ON HOLD | OUTPATIENT
Start: 2022-03-29 | End: 2022-05-08

## 2022-03-29 RX ORDER — AMLODIPINE BESYLATE 2.5 MG/1
2.5 TABLET ORAL NIGHTLY
Qty: 30 TABLET | Refills: 2 | Status: SHIPPED | OUTPATIENT
Start: 2022-03-29 | End: 2022-05-11 | Stop reason: SDUPTHER

## 2022-03-29 RX ORDER — CELECOXIB 200 MG/1
200 CAPSULE ORAL DAILY
Qty: 30 CAPSULE | Refills: 2 | Status: ON HOLD | OUTPATIENT
Start: 2022-03-29 | End: 2022-05-08

## 2022-03-29 RX ORDER — LISINOPRIL 5 MG/1
5 TABLET ORAL NIGHTLY
Qty: 30 TABLET | Refills: 5 | Status: SHIPPED | OUTPATIENT
Start: 2022-03-29 | End: 2022-03-29 | Stop reason: ALTCHOICE

## 2022-03-29 RX ORDER — HYDROCHLOROTHIAZIDE 25 MG/1
25 TABLET ORAL DAILY
Qty: 30 TABLET | Refills: 5 | Status: SHIPPED | OUTPATIENT
Start: 2022-03-29 | End: 2022-05-11 | Stop reason: SDUPTHER

## 2022-03-29 RX ORDER — DESVENLAFAXINE 50 MG/1
TABLET, EXTENDED RELEASE ORAL
Qty: 30 TABLET | Refills: 2 | Status: SHIPPED | OUTPATIENT
Start: 2022-03-29 | End: 2022-07-28 | Stop reason: SDUPTHER

## 2022-03-29 RX ORDER — FLUTICASONE PROPIONATE 50 MCG
1 SPRAY, SUSPENSION (ML) NASAL DAILY
Qty: 1 EACH | Refills: 5 | Status: SHIPPED | OUTPATIENT
Start: 2022-03-29 | End: 2022-07-28 | Stop reason: SDUPTHER

## 2022-03-29 RX ORDER — AMITRIPTYLINE HYDROCHLORIDE 25 MG/1
50 TABLET, FILM COATED ORAL NIGHTLY
Qty: 60 TABLET | Refills: 2 | Status: SHIPPED | OUTPATIENT
Start: 2022-03-29 | End: 2022-05-11 | Stop reason: SDUPTHER

## 2022-03-29 NOTE — PATIENT INSTRUCTIONS
Stop lisinopril. Begin norvasc 2.5mg at bedtime. Over the counter cranberry pills x 1 month. Take AZO as needed.

## 2022-03-29 NOTE — PROGRESS NOTES
[facility] [address] [facility]  [address] [place] [address]  [id number]  [id number]  Dept: [phone]  Dept Fax: [phone]  Loc: [phone]        Deepak Haynes is a 50 y.o. female who presents today for her medical conditions/ complaints as noted below. Deepak Haynes is c/o Annual Exam        Chief Complaint   Patient presents with    Annual Exam       HPI:     HPI    HTN:   3/29/22: Pt reports that she has not been taking her b/p at home. Pt notes compliacne of lisinopril 5mg at bedtime. Pt has previously reported hypotension with lisinopril 10mg daily. Discussed with pt that without increasing lisinopril we will not be able to obtain b/p within normal limits. She does not wish to take b/p meds twice daily. Feels that once per day would work better for her. Discussed changing b/p med to norvasc at bedtime. She is in agreement with this plan. 11/09/2022: pt reports that her blood pressure has been up and down. Pt notes compliance with HCTZ. States that she has had a lot of stress recently due to her mother's condition/ liver failure. She states that this weighs heavy on her due to her fatty liver disease. She states that she was previously prescribed lisinopril 10 mg at bedtime. States that this dropped her blood pressure too low and she had to stop taking. Patient reports that they have been compliant with taking medications as directed.      Past Medical History:   Diagnosis Date    Allergic rhinitis 11/10/2021    Anxiety 6/4/2020    Asthma     Chronic back pain 7/29/2018    Chronic migraine without aura, intractable, without status migrainosus     Colon polyps     Depression 7/29/2018    Elevated blood pressure, situational 5/29/2018    Essential tremor     Fibromyalgia     Liver disease     Fatty Liver    Meniere disease 2017    Osteoarthritis     Sciatica of right side 6/24/2020    Tinnitus        Past Surgical History:   Procedure Laterality Date     SECTION      CHOLECYSTECTOMY, LAPAROSCOPIC  2015    Dr. Dontae Lutz?    ?    HYSTERECTOMY      LIVER BIOPSY  2015    Dr. Korin Escobar History    Marital status:      Spouse name: None    Number of children: None    Years of education: None    Highest education level: None   Occupational History    None   Tobacco Use    Smoking status: Never Smoker    Smokeless tobacco: Never Used   Vaping Use    Vaping Use: Never used   Substance and Sexual Activity    Alcohol use: No    Drug use: No    Sexual activity: None   Other Topics Concern    None   Social History Narrative    None     Social Determinants of Health     Financial Resource Strain:     Difficulty of Paying Living Expenses: Not on file   Food Insecurity:     Worried About Running Out of Food in the Last Year: Not on file    Yadira of Food in the Last Year: Not on file   Transportation Needs:     Lack of Transportation (Medical): Not on file    Lack of Transportation (Non-Medical):  Not on file   Physical Activity:     Days of Exercise per Week: Not on file    Minutes of Exercise per Session: Not on file   Stress:     Feeling of Stress : Not on file   Social Connections:     Frequency of Communication with Friends and Family: Not on file    Frequency of Social Gatherings with Friends and Family: Not on file    Attends Judaism Services: Not on file    Active Member of [address] [place] [address] or Organizations: Not on file    Attends Club or Organization Meetings: Not on file    Marital Status: Not on file   Intimate Partner Violence:     Fear of Current or Ex-Partner: Not on file    Emotionally Abused: Not on file    Physically Abused: Not on file    Sexually Abused: Not on file   Housing Stability:     Unable to Pay for Housing in the Last Year: Not on file    Number of [place] in the Last Year: Not on file    Unstable Housing in the Last Year: Not on file       Family Anaphylaxis    Morphine And Related Anaphylaxis    Eggs Or Egg-Derived Products        Lab Review not applicable  notapplicable    Subjective:   Review of Systems   Constitutional: Negative for activity change, appetite change, fatigue, fever and unexpected weight change. HENT: Negative for congestion, ear pain, nosebleeds, rhinorrhea, sore throat, trouble swallowing and voice change. Eyes: Negative for redness and visual disturbance. Respiratory: Negative for cough, chest tightness, shortness of breath and wheezing. Cardiovascular: Negative for chest pain, palpitations and leg swelling. Elevated blood pressure on and off. Gastrointestinal: Negative for abdominal pain, blood in stool, constipation, diarrhea, nausea and vomiting. Endocrine: Negative for polydipsia, polyphagia and polyuria. Genitourinary: Negative for dysuria, frequency and urgency. Musculoskeletal: Positive for arthralgias and back pain (chronic). Negative for myalgias. Skin: Negative for rash and wound. Neurological: Negative for dizziness, speech difficulty, light-headedness and headaches. Psychiatric/Behavioral: Negative for agitation, confusion, self-injury and suicidal ideas. The patient is not nervous/anxious. Objective:     Physical Exam  Vitals and nursing note reviewed. Constitutional:       General: She is not in acute distress. Appearance: She is well-developed. She is not diaphoretic. HENT:      Head: Normocephalic and atraumatic. Right Ear: External ear normal.      Left Ear: External ear normal.      Nose: Nose normal.      Mouth/Throat:      Pharynx: No oropharyngeal exudate. Eyes:      General:         Right eye: No discharge. Left eye: No discharge. Conjunctiva/sclera: Conjunctivae normal.      Pupils: Pupils are equal, round, and reactive to light. Cardiovascular:      Rate and Rhythm: Normal rate and regular rhythm. Heart sounds: Normal heart sounds.  No murmur heard. Pulmonary:      Effort: Pulmonary effort is normal. No respiratory distress. Breath sounds: Normal breath sounds. No stridor. No wheezing or rales. Chest:      Chest wall: No tenderness. Breasts:      Right: No inverted nipple, mass, nipple discharge, skin change or tenderness. Left: No inverted nipple, mass, nipple discharge, skin change or tenderness. Abdominal:      General: Bowel sounds are normal. There is no distension. Palpations: Abdomen is soft. Tenderness: There is no abdominal tenderness. Musculoskeletal:         General: No tenderness or deformity. Normal range of motion. Cervical back: Normal range of motion and neck supple. Skin:     General: Skin is warm and dry. Findings: No erythema or rash. Neurological:      Mental Status: She is alert and oriented to person, place, and time. Cranial Nerves: No cranial nerve deficit. Coordination: Coordination normal.      Deep Tendon Reflexes: Reflexes are normal and symmetric. Psychiatric:         Behavior: Behavior normal.         Thought Content: Thought content normal.       BP (!) 160/105   Pulse 82   Temp 98.6 °F (37 °C) (Temporal)   Ht 5' 5\" (1.651 m)   Wt 151 lb 12.8 oz (68.9 kg)   SpO2 99%   BMI 25.26 kg/m²     Assessment:      Diagnosis Orders   1. Hypertension, essential, benign  hydroCHLOROthiazide (HYDRODIURIL) 25 MG tablet    amLODIPine (NORVASC) 2.5 MG tablet    DISCONTINUED: lisinopril (PRINIVIL;ZESTRIL) 5 MG tablet   2. Other chronic pain  amitriptyline (ELAVIL) 25 MG tablet   3. Chronic bilateral low back pain with sciatica, sciatica laterality unspecified  amitriptyline (ELAVIL) 25 MG tablet    celecoxib (CELEBREX) 200 MG capsule    desvenlafaxine succinate (PRISTIQ) 50 MG TB24 extended release tablet    gabapentin (NEURONTIN) 300 MG capsule   4. Anxiety  busPIRone (BUSPAR) 5 MG tablet   5.  Allergic rhinitis due to other allergic trigger, unspecified seasonality fluticasone (FLONASE) 50 MCG/ACT nasal spray   6. Fibromyalgia  gabapentin (NEURONTIN) 300 MG capsule   7. Breast cancer screening by mammogram  ZABRINA DIGITAL SCREEN W OR WO CAD BILATERAL     No results found for this visit on 22. Plan:     1. Hypertension, essential, benign    2. Other chronic pain    3. Chronic bilateral low back pain with sciatica, sciatica laterality unspecified    4. Anxiety    5. Allergic rhinitis due to other allergic trigger, unspecified seasonality    6. Fibromyalgia    7. Breast cancer screening by mammogram      Over 50% of the total visit time of 30 minutes was spent on counseling and or coordination of care of HTN, other chronic pain, chronic bilateral low back pain with sciatica, anxiety, allergic rhinitis, fibromyalgia, breast cancer screen. Return in about 1 month (around 2022) for well exam, pap in one month.  .  Orders Placed This Encounter   Procedures    ZABRINA DIGITAL SCREEN W OR WO CAD BILATERAL     Standing Status:   Future     Standing Expiration Date:   2023     Order Specific Question:   Reason for exam:     Answer:   breast cancer screen     Orders Placed This Encounter   Medications    amitriptyline (ELAVIL) 25 MG tablet     Sig: Take 2 tablets by mouth nightly     Dispense:  60 tablet     Refill:  2    busPIRone (BUSPAR) 5 MG tablet     Sig: TAKE 1 TABLET BY MOUTH THREE TIMES DAILY AS NEEDED FOR ANXIETY (NO FURTHER REFILLS UNTIL SEEN IN OFFICE)     Dispense:  90 tablet     Refill:  0    celecoxib (CELEBREX) 200 MG capsule     Sig: Take 1 capsule by mouth daily     Dispense:  30 capsule     Refill:  2    desvenlafaxine succinate (PRISTIQ) 50 MG TB24 extended release tablet     Sig: TAKE 1 TABLET BY MOUTH ONCE DAILY     Dispense:  30 tablet     Refill:  2    fluticasone (FLONASE) 50 MCG/ACT nasal spray     Si spray by Nasal route daily     Dispense:  1 each     Refill:  5    gabapentin (NEURONTIN) 300 MG capsule     Sig: TAKE 1 CAPSULE BY MOUTH NIGHTLY     Dispense:  30 capsule     Refill:  2    hydroCHLOROthiazide (HYDRODIURIL) 25 MG tablet     Sig: Take 1 tablet by mouth daily     Dispense:  30 tablet     Refill:  5    DISCONTD: lisinopril (PRINIVIL;ZESTRIL) 5 MG tablet     Sig: Take 1 tablet by mouth nightly Take 1/2 tablet at bedtime, may increase to one full tablet if needed. Dispense:  30 tablet     Refill:  5    amLODIPine (NORVASC) 2.5 MG tablet     Sig: Take 1 tablet by mouth nightly     Dispense:  30 tablet     Refill:  2       Patient Instructions   Stop lisinopril. Begin norvasc 2.5mg at bedtime. Over the counter cranberry pills x 1 month. Take AZO as needed.         /family given educational materials - see patient instructions. Discussed use, benefit, and side effects of prescribed medications. All patient/family questions answered and voiced understanding. Instructed to continue current medications, diet and exercise. Pt/family agreed with treatment plan. Follow up as directed and sooner if needed. Patient/ family instructed that is symptoms worsen or persist they are to contact office or report to nearest ER. They voice understanding and agreement with this plan.   signed by JUAN JOSE Fleming on 4/3/2022 at 1:11 PM CDT    This dictation was generated by voice recognition computer software. Although all attempts are made to edit the dictation for accuracy, there may be errors in the transcription that are not intended.

## 2022-04-03 PROBLEM — Z12.31 BREAST CANCER SCREENING BY MAMMOGRAM: Status: ACTIVE | Noted: 2021-07-16

## 2022-04-03 ASSESSMENT — ENCOUNTER SYMPTOMS
WHEEZING: 0
NAUSEA: 0
ABDOMINAL PAIN: 0
DIARRHEA: 0
VOICE CHANGE: 0
EYE REDNESS: 0
TROUBLE SWALLOWING: 0
VOMITING: 0
CONSTIPATION: 0
RHINORRHEA: 0
COUGH: 0
CHEST TIGHTNESS: 0
SHORTNESS OF BREATH: 0
SORE THROAT: 0
BLOOD IN STOOL: 0
BACK PAIN: 1

## 2022-05-08 ENCOUNTER — APPOINTMENT (OUTPATIENT)
Dept: CT IMAGING | Age: 52
End: 2022-05-08
Payer: COMMERCIAL

## 2022-05-08 ENCOUNTER — APPOINTMENT (OUTPATIENT)
Dept: MRI IMAGING | Age: 52
End: 2022-05-08
Payer: COMMERCIAL

## 2022-05-08 ENCOUNTER — HOSPITAL ENCOUNTER (OUTPATIENT)
Age: 52
Setting detail: OBSERVATION
Discharge: HOME | End: 2022-05-09
Attending: EMERGENCY MEDICINE
Payer: COMMERCIAL

## 2022-05-08 DIAGNOSIS — G45.9 TIA (TRANSIENT ISCHEMIC ATTACK): Primary | ICD-10-CM

## 2022-05-08 LAB
ALBUMIN SERPL-MCNC: 4.6 G/DL (ref 3.5–5.2)
ALP BLD-CCNC: 64 U/L (ref 35–104)
ALT SERPL-CCNC: 71 U/L (ref 5–33)
ANION GAP SERPL CALCULATED.3IONS-SCNC: 10 MMOL/L (ref 7–19)
AST SERPL-CCNC: 38 U/L (ref 5–32)
BASOPHILS ABSOLUTE: 0.1 K/UL (ref 0–0.2)
BASOPHILS RELATIVE PERCENT: 1.1 % (ref 0–1)
BILIRUB SERPL-MCNC: 0.3 MG/DL (ref 0.2–1.2)
BUN BLDV-MCNC: 8 MG/DL (ref 6–20)
CALCIUM SERPL-MCNC: 10 MG/DL (ref 8.6–10)
CHLORIDE BLD-SCNC: 100 MMOL/L (ref 98–111)
CO2: 31 MMOL/L (ref 22–29)
CREAT SERPL-MCNC: 0.7 MG/DL (ref 0.5–0.9)
EOSINOPHILS ABSOLUTE: 0.2 K/UL (ref 0–0.6)
EOSINOPHILS RELATIVE PERCENT: 2.5 % (ref 0–5)
GFR AFRICAN AMERICAN: >59
GFR NON-AFRICAN AMERICAN: >60
GLUCOSE BLD-MCNC: 109 MG/DL (ref 74–109)
HBA1C MFR BLD: 6.2 % (ref 4–6)
HCT VFR BLD CALC: 46.9 % (ref 37–47)
HEMOGLOBIN: 15.7 G/DL (ref 12–16)
IMMATURE GRANULOCYTES #: 0 K/UL
LYMPHOCYTES ABSOLUTE: 2.5 K/UL (ref 1.1–4.5)
LYMPHOCYTES RELATIVE PERCENT: 32.8 % (ref 20–40)
MCH RBC QN AUTO: 30 PG (ref 27–31)
MCHC RBC AUTO-ENTMCNC: 33.5 G/DL (ref 33–37)
MCV RBC AUTO: 89.5 FL (ref 81–99)
MONOCYTES ABSOLUTE: 0.7 K/UL (ref 0–0.9)
MONOCYTES RELATIVE PERCENT: 9.9 % (ref 0–10)
NEUTROPHILS ABSOLUTE: 4 K/UL (ref 1.5–7.5)
NEUTROPHILS RELATIVE PERCENT: 53.4 % (ref 50–65)
PDW BLD-RTO: 11.9 % (ref 11.5–14.5)
PLATELET # BLD: 360 K/UL (ref 130–400)
PMV BLD AUTO: 10.2 FL (ref 9.4–12.3)
POTASSIUM REFLEX MAGNESIUM: 4.2 MMOL/L (ref 3.5–5)
RBC # BLD: 5.24 M/UL (ref 4.2–5.4)
SODIUM BLD-SCNC: 141 MMOL/L (ref 136–145)
TOTAL PROTEIN: 6.9 G/DL (ref 6.6–8.7)
WBC # BLD: 7.5 K/UL (ref 4.8–10.8)

## 2022-05-08 PROCEDURE — 6370000000 HC RX 637 (ALT 250 FOR IP): Performed by: NURSE PRACTITIONER

## 2022-05-08 PROCEDURE — 99223 1ST HOSP IP/OBS HIGH 75: CPT | Performed by: PSYCHIATRY & NEUROLOGY

## 2022-05-08 PROCEDURE — G0378 HOSPITAL OBSERVATION PER HR: HCPCS

## 2022-05-08 PROCEDURE — 93005 ELECTROCARDIOGRAM TRACING: CPT | Performed by: EMERGENCY MEDICINE

## 2022-05-08 PROCEDURE — 36415 COLL VENOUS BLD VENIPUNCTURE: CPT

## 2022-05-08 PROCEDURE — 96372 THER/PROPH/DIAG INJ SC/IM: CPT

## 2022-05-08 PROCEDURE — 6360000002 HC RX W HCPCS: Performed by: NURSE PRACTITIONER

## 2022-05-08 PROCEDURE — 70551 MRI BRAIN STEM W/O DYE: CPT

## 2022-05-08 PROCEDURE — 83036 HEMOGLOBIN GLYCOSYLATED A1C: CPT

## 2022-05-08 PROCEDURE — 80053 COMPREHEN METABOLIC PANEL: CPT

## 2022-05-08 PROCEDURE — 70496 CT ANGIOGRAPHY HEAD: CPT

## 2022-05-08 PROCEDURE — 85025 COMPLETE CBC W/AUTO DIFF WBC: CPT

## 2022-05-08 PROCEDURE — 99285 EMERGENCY DEPT VISIT HI MDM: CPT

## 2022-05-08 PROCEDURE — 6370000000 HC RX 637 (ALT 250 FOR IP): Performed by: EMERGENCY MEDICINE

## 2022-05-08 PROCEDURE — 70450 CT HEAD/BRAIN W/O DYE: CPT

## 2022-05-08 PROCEDURE — 6360000004 HC RX CONTRAST MEDICATION: Performed by: EMERGENCY MEDICINE

## 2022-05-08 RX ORDER — DESVENLAFAXINE 50 MG/1
50 TABLET, EXTENDED RELEASE ORAL DAILY
Status: DISCONTINUED | OUTPATIENT
Start: 2022-05-08 | End: 2022-05-09 | Stop reason: HOSPADM

## 2022-05-08 RX ORDER — ONDANSETRON 4 MG/1
4 TABLET, ORALLY DISINTEGRATING ORAL EVERY 8 HOURS PRN
Status: DISCONTINUED | OUTPATIENT
Start: 2022-05-08 | End: 2022-05-09 | Stop reason: HOSPADM

## 2022-05-08 RX ORDER — BUSPIRONE HYDROCHLORIDE 5 MG/1
5 TABLET ORAL 3 TIMES DAILY
Status: DISCONTINUED | OUTPATIENT
Start: 2022-05-08 | End: 2022-05-08

## 2022-05-08 RX ORDER — ROSUVASTATIN CALCIUM 20 MG/1
40 TABLET, COATED ORAL NIGHTLY
Status: DISCONTINUED | OUTPATIENT
Start: 2022-05-08 | End: 2022-05-09 | Stop reason: HOSPADM

## 2022-05-08 RX ORDER — POLYETHYLENE GLYCOL 3350 17 G/17G
17 POWDER, FOR SOLUTION ORAL DAILY PRN
Status: DISCONTINUED | OUTPATIENT
Start: 2022-05-08 | End: 2022-05-09 | Stop reason: HOSPADM

## 2022-05-08 RX ORDER — ASPIRIN 81 MG/1
81 TABLET ORAL DAILY
Status: DISCONTINUED | OUTPATIENT
Start: 2022-05-08 | End: 2022-05-08 | Stop reason: SDUPTHER

## 2022-05-08 RX ORDER — AMITRIPTYLINE HYDROCHLORIDE 50 MG/1
50 TABLET, FILM COATED ORAL NIGHTLY
Status: DISCONTINUED | OUTPATIENT
Start: 2022-05-08 | End: 2022-05-09

## 2022-05-08 RX ORDER — ASCORBIC ACID 500 MG
500 TABLET ORAL DAILY
Status: DISCONTINUED | OUTPATIENT
Start: 2022-05-08 | End: 2022-05-09 | Stop reason: HOSPADM

## 2022-05-08 RX ORDER — ZINC SULFATE 50(220)MG
50 CAPSULE ORAL DAILY
Status: DISCONTINUED | OUTPATIENT
Start: 2022-05-08 | End: 2022-05-09

## 2022-05-08 RX ORDER — ENOXAPARIN SODIUM 100 MG/ML
40 INJECTION SUBCUTANEOUS EVERY 24 HOURS
Status: DISCONTINUED | OUTPATIENT
Start: 2022-05-08 | End: 2022-05-09 | Stop reason: HOSPADM

## 2022-05-08 RX ORDER — ONDANSETRON 2 MG/ML
4 INJECTION INTRAMUSCULAR; INTRAVENOUS EVERY 6 HOURS PRN
Status: DISCONTINUED | OUTPATIENT
Start: 2022-05-08 | End: 2022-05-09 | Stop reason: HOSPADM

## 2022-05-08 RX ORDER — ASCORBIC ACID 500 MG
500 TABLET ORAL DAILY
COMMUNITY

## 2022-05-08 RX ORDER — GABAPENTIN 300 MG/1
300 CAPSULE ORAL NIGHTLY
Status: DISCONTINUED | OUTPATIENT
Start: 2022-05-08 | End: 2022-05-08

## 2022-05-08 RX ORDER — ASPIRIN 81 MG/1
81 TABLET ORAL DAILY
Status: DISCONTINUED | OUTPATIENT
Start: 2022-05-08 | End: 2022-05-09 | Stop reason: HOSPADM

## 2022-05-08 RX ORDER — BUSPIRONE HYDROCHLORIDE 5 MG/1
5 TABLET ORAL 3 TIMES DAILY PRN
Status: DISCONTINUED | OUTPATIENT
Start: 2022-05-08 | End: 2022-05-09 | Stop reason: HOSPADM

## 2022-05-08 RX ORDER — AMLODIPINE BESYLATE 2.5 MG/1
2.5 TABLET ORAL NIGHTLY
Status: DISCONTINUED | OUTPATIENT
Start: 2022-05-08 | End: 2022-05-09

## 2022-05-08 RX ORDER — LORAZEPAM 1 MG/1
1 TABLET ORAL ONCE
Status: COMPLETED | OUTPATIENT
Start: 2022-05-08 | End: 2022-05-08

## 2022-05-08 RX ORDER — ASPIRIN 300 MG/1
300 SUPPOSITORY RECTAL DAILY
Status: DISCONTINUED | OUTPATIENT
Start: 2022-05-08 | End: 2022-05-09 | Stop reason: HOSPADM

## 2022-05-08 RX ADMIN — ROSUVASTATIN 40 MG: 20 TABLET, FILM COATED ORAL at 20:02

## 2022-05-08 RX ADMIN — ENOXAPARIN SODIUM 40 MG: 100 INJECTION SUBCUTANEOUS at 22:12

## 2022-05-08 RX ADMIN — ASPIRIN 81 MG: 81 TABLET, COATED ORAL at 22:06

## 2022-05-08 RX ADMIN — AMLODIPINE BESYLATE 2.5 MG: 2.5 TABLET ORAL at 20:02

## 2022-05-08 RX ADMIN — AMITRIPTYLINE HYDROCHLORIDE 50 MG: 50 TABLET, FILM COATED ORAL at 20:02

## 2022-05-08 RX ADMIN — LORAZEPAM 1 MG: 1 TABLET ORAL at 12:30

## 2022-05-08 RX ADMIN — IOPAMIDOL 90 ML: 755 INJECTION, SOLUTION INTRAVENOUS at 13:29

## 2022-05-08 ASSESSMENT — ENCOUNTER SYMPTOMS
DIARRHEA: 0
TROUBLE SWALLOWING: 0
SINUS PAIN: 0
EYE DISCHARGE: 0
ABDOMINAL DISTENTION: 0
SHORTNESS OF BREATH: 0
SORE THROAT: 0
CHOKING: 0
BLOOD IN STOOL: 0
ABDOMINAL PAIN: 0
WHEEZING: 0
FACIAL SWELLING: 0
VOICE CHANGE: 0
VOMITING: 0
SINUS PRESSURE: 0
CONSTIPATION: 0
NAUSEA: 0
APNEA: 0
COUGH: 0

## 2022-05-08 NOTE — CONSULTS
[address]  [place], [place]  Phone [phone]     Neurology Consultation     Date of Admission: 2022 11:47 AM  Date of Consultation: 22    Attending Provider: Erasto Walsh MD  Consulting Provider: Gato Hamilton M.D. Patient: Kelsea Powers  :  1970  Age:  50 y.o. MRN:  [id number]    CHIEF COMPLAINT:  Right sided numbness/tingling    History Source: History obtained from chart review and the patient. PCP: JUAN JOSE Cornell    HISTORY OF PRESENT ILLNESS:   Kelsea Powers is a 50y.o. year old woman with a history of hypertension who presented to the ER with recurrent episodes of right sided numbness and tingling over the past 4 to 5 days. She has had several, over a dozen episodes of numbness and tingling that starts in the right face and quickly spreads into the right hand and arm. It lasts a couple minutes and resolves. More recently it has involved the entire right sided - face, arm, torso, leg. It does not seem to completely resolve. She has not noted precipitating, exacerbating, or alleviating features. She has not had weakness or dyscorrdination. She has had no dysarthria or visual symptoms. She has had no headaches. She is right handed.       PAST MEDICAL HISTORY:    Medical History:      Diagnosis Date    Allergic rhinitis 11/10/2021    Anxiety 2020    Asthma     Chronic back pain 2018    Chronic migraine without aura, intractable, without status migrainosus     Colon polyps     Depression 2018    Elevated blood pressure, situational 2018    Essential tremor     Fibromyalgia     Liver disease     Fatty Liver    Meniere disease 2017    Osteoarthritis     Sciatica of right side 2020    Tinnitus        Surgical History:      Procedure Laterality Date     SECTION      CHOLECYSTECTOMY, LAPAROSCOPIC  2015    Dr. Jorge L Sargent?    ?    HYSTERECTOMY      LIVER BIOPSY  07/2015    Dr. Junito Crockett       Medications Prior to Admission:    Prior to Admission medications    Medication Sig Start Date End Date Taking? Authorizing Provider   amitriptyline (ELAVIL) 25 MG tablet Take 2 tablets by mouth nightly 3/29/22 4/28/22  JUAN JOSE Al   busPIRone (BUSPAR) 5 MG tablet TAKE 1 TABLET BY MOUTH THREE TIMES DAILY AS NEEDED FOR ANXIETY (NO FURTHER REFILLS UNTIL SEEN IN OFFICE) 3/29/22   JUAN JOSE Al   celecoxib (CELEBREX) 200 MG capsule Take 1 capsule by mouth daily 3/29/22 4/28/22  JUAN JOSE Al   desvenlafaxine succinate (PRISTIQ) 50 MG TB24 extended release tablet TAKE 1 TABLET BY MOUTH ONCE DAILY 3/29/22   JUAN JOSE Al   fluticasone [facility]) 50 MCG/ACT nasal spray 1 spray by Nasal route daily 3/29/22 11/24/22  JUAN JOSE Al   gabapentin (NEURONTIN) 300 MG capsule TAKE 1 CAPSULE BY MOUTH NIGHTLY 3/29/22 8/16/22  JUAN JOSE Al   hydroCHLOROthiazide (HYDRODIURIL) 25 MG tablet Take 1 tablet by mouth daily 3/29/22 4/28/22  JUAN JOSE Al   amLODIPine (NORVASC) 2.5 MG tablet Take 1 tablet by mouth nightly 3/29/22 4/28/22  JUAN JOSE Al   triamcinolone (KENALOG) 0.1 % cream Apply topically 2 times daily as needed for itching. Patient not taking: Reported on 11/9/2021 7/30/21   JUAN JOSE Carranza   vitamin E 1000 units capsule Take 1,000 Units by mouth daily    Historical Provider, MD       Current Medications:  Current Facility-Administered Medications: bupivacaine (MARCAINE) 0.5 % injection 15 mg, 3 mL, Intra-artICUlar, Once  lidocaine 1 % injection 3 mL, 3 mL, IntraDERmal, Once    Allergies:  Latex, Codeine, Morphine, Morphine and related, and Eggs or egg-derived products    Habits:  TOBACCO:   reports that she has never smoked. She has never used smokeless tobacco.  ETOH:   reports no history of alcohol use. DRUGS:    Social History     Substance and Sexual Activity   Drug Use No SOCIAL HISTORY:   Francisco Perez is , lives in [place], [place], and is an . FAMILY HISTORY:       Problem Relation Age of Onset    Colon Polyps Paternal Grandfather     Liver Cancer Paternal Grandfather     Colon Polyps Mother     Liver Cancer Mother     Heart Disease Mother     Cancer Mother     Diabetes Mother     Colon Polyps Maternal Grandmother     Heart Disease Father     Cancer Father     Colon Cancer Neg Hx     Esophageal Cancer Neg Hx     Liver Disease Neg Hx     Rectal Cancer Neg Hx     Stomach Cancer Neg Hx        REVIEW OF SYSTEMS:  Review of Systems   Constitutional: Negative for chills and fever. HENT: Negative for hearing loss and tinnitus. Eyes: Negative for photophobia and visual disturbance. Respiratory: Negative for cough and shortness of breath. Cardiovascular: Negative for chest pain and palpitations. Gastrointestinal: Negative for nausea and vomiting. Endocrine: Negative for polydipsia and polyuria. Genitourinary: Negative for frequency and urgency. Musculoskeletal: Positive for arthralgias. Negative for back pain. Skin: Negative for rash and wound. Allergic/Immunologic: Negative for environmental allergies and food allergies. Neurological: Positive for numbness. Negative for tremors, seizures, syncope, facial asymmetry, speech difficulty, weakness, light-headedness and headaches. Hematological: Negative for adenopathy. Does not bruise/bleed easily. Psychiatric/Behavioral: Negative for dysphoric mood. The patient is not nervous/anxious. PHYSICAL EXAMINATION:  Vitals: BP (!) 167/98   Pulse 77   Temp 98.2 °F (36.8 °C) (Oral)   Resp 16   SpO2 95%   General appearance: alert, appears stated age and cooperative  Skin: Skin color, texture, turgor normal. No rashes or lesions  HEENT: Head: Normal, normocephalic, atraumatic.   Neck:no adenopathy, no carotid bruit, no JVD, supple, symmetrical, trachea midline and thyroid not enlarged, symmetric, no tenderness/mass/nodules  Lungs: clear to auscultation bilaterally  Heart: regular rate and rhythm, S1, S2 normal, no murmur, click, rub or gallop  Abdomen: soft, non-tender; bowel sounds normal; no masses,  no organomegaly  Extremities: extremities normal, atraumatic, no cyanosis or edema      NEUROLOGIC EXAMINATION:  Neurologic Exam     Mental Status   Oriented to person, place, and time. Speech: speech is normal   Level of consciousness: alert    Cranial Nerves     CN II   Visual fields full to confrontation. CN III, IV, VI   Pupils are equal, round, and reactive to light. Extraocular motions are normal.     CN VII   Facial expression full, symmetric.      CN VIII   Hearing: intact    CN IX, X   Palate: symmetric    CN XI   Right sternocleidomastoid strength: normal  Left sternocleidomastoid strength: normal  Right trapezius strength: normal  Left trapezius strength: normal    CN XII   Tongue: not atrophic  Fasciculations: absent  Tongue deviation: none    Motor Exam   Muscle bulk: normal  Overall muscle tone: normal  Right arm pronator drift: absent  Left arm pronator drift: absent    Strength   Right neck flexion: 5/5  Left neck flexion: 5/5  Right neck extension: 5/5  Left neck extension: 5/5  Right deltoid: 5/5  Left deltoid: 5/5  Right biceps: 5/5  Left biceps: 5/5  Right triceps: 5/5  Left triceps: 5/5  Right wrist flexion: 5/5  Left wrist flexion: 5/5  Right wrist extension: 5/5  Left wrist extension: 5/5  Right interossei: 5/5  Left interossei: 5/5  Right iliopsoas: 5/5  Left iliopsoas: 5/5  Right quadriceps: 5/5  Left quadriceps: 5/5  Right glutei: 5/5  Left glutei: 5/5  Right anterior tibial: 5/5  Left anterior tibial: 5/5  Right posterior tibial: 5/5  Left posterior tibial: 5/5  Right peroneal: 5/5  Left peroneal: 5/5  Right gastroc: 5/5  Left gastroc: 5/5    Sensory Exam   Light touch normal.   Vibration normal.   Pinprick normal.     Gait, Coordination, and Reflexes Coordination   Finger to nose coordination: normal  Heel to shin coordination: normal    Tremor   Resting tremor: absent  Intention tremor: absent  Action tremor: absent    Reflexes   Right brachioradialis: 2+  Left brachioradialis: 2+  Right biceps: 2+  Left biceps: 2+  Right triceps: 2+  Left triceps: 2+  Right patellar: 2+  Left patellar: 2+  Right achilles: 2+  Left achilles: 2+  Right plantar: normal  Left plantar: normal  Right Little: absent  Left Little: absent  Right ankle clonus: absent  Left ankle clonus: absent  Rapid alternating movements were normal.       ADDITIONAL REVIEW:  CBC:    Recent Labs     05/08/22  1225   WBC 7.5   HGB 15.7        BMP:     Recent Labs     05/08/22  1225      K 4.2      CO2 31*   BUN 8   CREATININE 0.7   GLUCOSE 109     Hepatic:  Recent Labs     05/08/22  1225   AST 38*   ALT 71*   BILITOT 0.3   ALKPHOS 64     Narrative   EXAMINATION: CT HEAD WO CONTRAST 5/8/2022 2:56 PM   HISTORY: Right-sided numbness for 5 days intermittently. Stroke   symptoms. DOSE: 839 mGycm. Automatic exposure control was utilized in an effort   to use as little radiation as possible, without compromising image   quality. REPORT:    Spiral CT of the head was performed without intravenous contrast.   Reconstructed coronal and sagittal images were also obtained. COMPARISON: CT head without contrast 10/24/2018. No evidence of intracranial hemorrhage, mass or mass-effect is   identified. The ventricles and basal cisterns appear within normal   limits. There is an \"empty\" sella as before. This can be seen as a   normal variant. Bone windows are unremarkable.       Impression   Impression: No acute intracranial abnormality.        Signed by Dr Gurvinder Coker     Narrative   EXAMINATION: CTA HEAD NECK W CONTRAST 5/8/2022 3:30 PM   HISTORY: Intermittent right-sided facial, upper and lower 70   paresthesias for 5 days, increasing frequency. DOSE: 640 mGycm.  Automatic exposure control was utilized in an effort   to use as little radiation as possible, without compromising image   quality. REPORT: Spiral CT of the head and neck was performed after   administration of intravenous contrast, using CTA protocol, which   includes reconstructed coronal, sagittal and 3-D images. COMPARISON: Noncontrast CT of the head 5/8/2022. NASCET criteria is utilized for determination of carotid stenoses. Review of lung windows demonstrates normal aeration of the visualized   upper lobes, there is symmetric subpleural opacity at the apices, most   compatible with chronic fibrosis. Caliber of the aortic arch is   normal. No aortic dissection is seen involving the visualized thoracic   aorta. The right common carotid artery is patent, normal caliber,   without evidence of dissection. There is no significant plaque   deposition at the right carotid bifurcation, with 0% stenosis. The   right internal and external carotid arteries are patent. The left   common carotid artery is patent, normal caliber, without evidence of   dissection. There is no significant plaque deposition at the left   carotid bifurcation, with 0% stenosis. Bilateral vertebral arteries   are patent, neither vertebral artery appears dominant, no evidence of   vertebral dissection is identified. Intracranially, bilateral PICA are   patent, the basilar artery is patent and normal in caliber. Distal   branch vessels of the basilar artery are patent, there is fetal origin   of the left posterior cerebral artery, a congenital variant. The right   posterior communicating artery is patent. In the anterior circulation,   the middle and anterior cerebral arteries are patent and appear normal   in caliber, there is no large vessel occlusion or high-grade stenosis. No intracranial aneurysm is identified. Review of soft tissue windows   demonstrates no neck mass or evidence of lymphadenopathy. The airway   is patent.  There is normal aeration of the paranasal sinuses and   mastoid air cells. Review of bone windows shows no acute osseous   abnormality.       Impression   1. No intracranial arterial large vessel occlusion or significant   stenosis is identified. No intracranial aneurysm. There is fetal   origin of the left posterior cerebral artery, a normal variant. 2. No significant steno-occlusive disease involving the carotid or   vertebral arteries. No evidence of carotid or vertebral dissection. Signed by Dr Geeta Mascorro. [facility]       IMPRESSION:  1. Recurrent sensory TIAs. Sometimes sensory TIAs occur just like this dozens of times over a week or two before finally resolving. 2. Hypertension  3. Elevated liver enzymes, recently evaluated. PLAN:  1. MRI head  2. Echo with bubble study  3. Telemetry  4. Labs  5.  GABBI Hummel M.D.

## 2022-05-08 NOTE — ED PROVIDER NOTES
[facility]  eMERGENCY dEPARTMENT eNCOUnter      Pt Name: Cirilo Drake  MRN: [id number]  [place] 1970  Date of evaluation: 5/8/2022  Provider: Andrew Trammell MD    [address] [place] [address]       Chief Complaint   Patient presents with    Tingling     pt presents to ED with c/o right sided numbness on and off for 5 days. HISTORY OF PRESENT ILLNESS   (Location/Symptom, Timing/Onset,Context/Setting, Quality, Duration, Modifying Factors, Severity)  Note limiting factors. Cirilo Drake is a 50 y.o. female who presents to the emergency department tingling of the right side of the body    45-year-old female presents with a 5-day history of right-sided paresthesias that come and go. But have increased in frequency. And now she feels that they are not resolving completely. It involves her face tongue arm leg. All on the right side. She is not complaining of headache. Headache started about 5 days ago for no provocative reason. No history of MS or this. No fever chills injuries. No headache. History of empty sella. Took Parlodel when she was adolescent. She is not on any blood thinners. She has a history of hypertension. History of fibromyalgia. The history is provided by the patient. NursingNotes were reviewed. REVIEW OF SYSTEMS    (2-9 systems for level 4, 10 or more for level 5)     Review of Systems   Constitutional: Negative for chills and fever. HENT: Negative for congestion, drooling, facial swelling, nosebleeds, sinus pressure, sore throat and voice change. Eyes: Negative for discharge. Respiratory: Negative for apnea, choking and shortness of breath. Cardiovascular: Negative for chest pain and leg swelling. Gastrointestinal: Negative for abdominal pain, blood in stool, constipation, diarrhea, nausea and vomiting. Genitourinary: Negative for dysuria and enuresis. Musculoskeletal: Negative for gait problem and joint swelling.    Skin: Negative for rash and wound.   Neurological: Positive for numbness. Negative for dizziness, seizures, syncope, facial asymmetry, speech difficulty, weakness and headaches. Psychiatric/Behavioral: Negative for behavioral problems, hallucinations and suicidal ideas. All other systems reviewed and are negative. A complete review of systems was performed and is negative except as noted above in the HPI.        PAST MEDICAL HISTORY     Past Medical History:   Diagnosis Date    Allergic rhinitis 11/10/2021    Anxiety 2020    Asthma     Chronic back pain 2018    Chronic migraine without aura, intractable, without status migrainosus     Colon polyps     Depression 2018    Elevated blood pressure, situational 2018    Essential tremor     Fibromyalgia     Liver disease     Fatty Liver    Meniere disease     Osteoarthritis     Sciatica of right side 2020    Tinnitus          SURGICAL HISTORY       Past Surgical History:   Procedure Laterality Date     SECTION      CHOLECYSTECTOMY, LAPAROSCOPIC  2015    Dr. Aliya Parker?    ?    HYSTERECTOMY      LIVER BIOPSY  2015    Dr. Suad Jama       Previous Medications    AMITRIPTYLINE (ELAVIL) 25 MG TABLET    Take 2 tablets by mouth nightly    AMLODIPINE (NORVASC) 2.5 MG TABLET    Take 1 tablet by mouth nightly    BUSPIRONE (BUSPAR) 5 MG TABLET    TAKE 1 TABLET BY MOUTH THREE TIMES DAILY AS NEEDED FOR ANXIETY (NO FURTHER REFILLS UNTIL SEEN IN OFFICE)    CELECOXIB (CELEBREX) 200 MG CAPSULE    Take 1 capsule by mouth daily    DESVENLAFAXINE SUCCINATE (PRISTIQ) 50 MG TB24 EXTENDED RELEASE TABLET    TAKE 1 TABLET BY MOUTH ONCE DAILY    FLUTICASONE (FLONASE) 50 MCG/ACT NASAL SPRAY    1 spray by Nasal route daily    GABAPENTIN (NEURONTIN) 300 MG CAPSULE    TAKE 1 CAPSULE BY MOUTH NIGHTLY    HYDROCHLOROTHIAZIDE (HYDRODIURIL) 25 MG TABLET    Take 1 tablet by mouth daily    TRIAMCINOLONE (KENALOG) 0.1 % CREAM Apply topically 2 times daily as needed for itching. VITAMIN E 1000 UNITS CAPSULE    Take 1,000 Units by mouth daily       ALLERGIES     Latex, Codeine, Morphine, Morphine and related, and Eggs or egg-derived products    FAMILY HISTORY       Family History   Problem Relation Age of Onset    Colon Polyps Paternal Grandfather     Liver Cancer Paternal Grandfather     Colon Polyps Mother     Liver Cancer Mother     Heart Disease Mother     Cancer Mother     Diabetes Mother     Colon Polyps Maternal Grandmother     Heart Disease Father     Cancer Father     Colon Cancer Neg Hx     Esophageal Cancer Neg Hx     Liver Disease Neg Hx     Rectal Cancer Neg Hx     Stomach Cancer Neg Hx           SOCIAL HISTORY       Social History     Socioeconomic History    Marital status:      Spouse name: Not on file    Number of children: Not on file    Years of education: Not on file    Highest education level: Not on file   Occupational History    Not on file   Tobacco Use    Smoking status: Never Smoker    Smokeless tobacco: Never Used   Vaping Use    Vaping Use: Never used   Substance and Sexual Activity    Alcohol use: No    Drug use: No    Sexual activity: Not on file   Other Topics Concern    Not on file   Social History Narrative    Not on file     Social Determinants of Health     Financial Resource Strain:     Difficulty of Paying Living Expenses: Not on file   Food Insecurity:     Worried About Running Out of Food in the Last Year: Not on file    Ydaira of Food in the Last Year: Not on file   Transportation Needs:     Lack of Transportation (Medical): Not on file    Lack of Transportation (Non-Medical):  Not on file   Physical Activity:     Days of Exercise per Week: Not on file    Minutes of Exercise per Session: Not on file   Stress:     Feeling of Stress : Not on file   Social Connections:     Frequency of Communication with Friends and Family: Not on file    Frequency of Social Gatherings with Friends and Family: Not on file    Attends Confucianist Services: Not on file    Active Member of Clubs or Organizations: Not on file    Attends Club or Organization Meetings: Not on file    Marital Status: Not on file   Intimate Partner Violence:     Fear of Current or Ex-Partner: Not on file    Emotionally Abused: Not on file    Physically Abused: Not on file    Sexually Abused: Not on file   Housing Stability:     Unable to Pay for Housing in the Last Year: Not on file    Number of [place] in the Last Year: Not on file    Unstable Housing in the Last Year: Not on file       SCREENINGS    Sophia Coma Scale  Eye Opening: Spontaneous  Best Verbal Response: Oriented  Best Motor Response: Obeys commands  Lino Coma Scale Score: 15        PHYSICAL EXAM    (up to 7 for level 4, 8 or more for level 5)     ED Triage Vitals [05/08/22 1153]   BP Temp Temp Source Pulse Resp SpO2 Height Weight   (!) 167/98 98.2 °F (36.8 °C) Oral 77 16 95 % -- --       Physical Exam  Vitals and nursing note reviewed. Constitutional:       General: She is not in acute distress. Appearance: She is well-developed. HENT:      Head: Normocephalic and atraumatic. Right Ear: External ear normal.      Left Ear: External ear normal.      Nose: Nose normal.      Mouth/Throat:      Mouth: Mucous membranes are moist.      Pharynx: Oropharynx is clear. Eyes:      General: No scleral icterus. Conjunctiva/sclera: Conjunctivae normal.      Pupils: Pupils are equal, round, and reactive to light. Neck:      Vascular: No carotid bruit. Cardiovascular:      Rate and Rhythm: Normal rate and regular rhythm. Pulses: Normal pulses. Heart sounds: Normal heart sounds. No murmur heard. Pulmonary:      Effort: Pulmonary effort is normal.      Breath sounds: Normal breath sounds. No rales. Abdominal:      General: Bowel sounds are normal.      Palpations: Abdomen is soft.    Musculoskeletal: General: Normal range of motion. Cervical back: Normal range of motion and neck supple. Right lower leg: No edema. Left lower leg: No edema. Skin:     General: Skin is warm and dry. Coloration: Skin is not jaundiced or pale. Neurological:      General: No focal deficit present. Mental Status: She is alert and oriented to person, place, and time. Psychiatric:         Mood and Affect: Mood normal.         Behavior: Behavior normal.      Comments: Mild anxiety asked for something for anxiety when offered. DIAGNOSTIC RESULTS     EKG: All EKG's are interpreted by the Emergency Department Physician who either signs or Co-signs this chart in the absence of a cardiologist.    Sinus rhythm rate 73. FL interval 157. QTc 459. No ST abnormality to suggest ischemia or infarction otherwise normal EKG. RADIOLOGY:   Non-plain film images such as CT, Ultrasound and MRI are read by the radiologist. Plainradiographic images are visualized and preliminarily interpreted by the emergency physician with the below findings:    I have reviewed the results. Interpretation per the Radiologist below, if available at the time of this note:     [facility]   Final Result   1. No intracranial arterial large vessel occlusion or significant   stenosis is identified. No intracranial aneurysm. There is fetal   origin of the left posterior cerebral artery, a normal variant. 2. No significant steno-occlusive disease involving the carotid or   vertebral arteries. No evidence of carotid or vertebral dissection. Signed by Dr Juan Reece. John Paul      CT Head WO Contrast   Final Result   Impression: No acute intracranial abnormality. Signed by Dr Juan Reece.  John Paul            ED BEDSIDE ULTRASOUND:   Performed by ED Physician - none    LABS:  Labs Reviewed   CBC WITH AUTO DIFFERENTIAL - Abnormal; Notable for the following components:       Result Value    Basophils % 1.1 (*)     All other components within normal limits   COMPREHENSIVE METABOLIC PANEL W/ REFLEX TO MG FOR LOW K - Abnormal; Notable for the following components:    CO2 31 (*)     ALT 71 (*)     AST 38 (*)     All other components within normal limits       All other labs were within normal range or not returned as of this dictation. EMERGENCY DEPARTMENT COURSE and DIFFERENTIALDIAGNOSIS/MDM:   Vitals:    Vitals:    05/08/22 1153   BP: (!) 167/98   Pulse: 77   Resp: 16   Temp: 98.2 °F (36.8 °C)   TempSrc: Oral   SpO2: 95%       MDM  Number of Diagnoses or Management Options  TIA (transient ischemic attack)  Diagnosis management comments: 3:20 PM.  Patient stable. I discussed the case with Dr. Mendy Olivas. He wants to admit he will evaluate in ED probably follow-up with MRI. Patient is satisfactory with this plan. I will discussed the case with the hospitalist service. CONSULTS:  IP CONSULT TO NEUROLOGY  IP CONSULT TO HOSPITALIST    PROCEDURES:  Unless otherwise notedbelow, none     Procedures    FINAL IMPRESSION     1.  TIA (transient ischemic attack)          DISPOSITION/PLAN   DISPOSITION Decision To Admit 05/08/2022 03:17:11 PM      PATIENT REFERRED TO:  @FUP@    DISCHARGE MEDICATIONS:  New Prescriptions    No medications on file          (Please note that portions of this note were completed with a voice recognition program.  Efforts were made to edit the dictations butoccasionally words are mis-transcribed.)    Ree Ovalle MD (electronically signed)  AttendingEmergency Physician          Riky Bernal MD  05/08/22 9467

## 2022-05-08 NOTE — H&P
[facility] - History & Physical      PCP: JUAN JOSE Huffman    Date of Admission: 5/8/2022    Date of Service: 5/8/2022    Chief Complaint:  Right sided numbness    History Of Present Illness: The patient is a 50 y.o. female with a PMH of RENEE, HTN, and depression, who presented to [address] [place] [address] ED on 5/8/2022 complaining of right-sided numbness. She states that she began to have intermittent right-sided numbness from her head to her toe approximately 5 days ago. She describes it as a heaviness and tingling. She is capable of using her extremities but it is very difficult when these episodes happen. She became concerned whenever their frequency began to increase and symptoms remain for longer periods of time. She denies any recent syncope, dizziness, or headaches. Denies any recent illnesses. Denies fever chills. She has never had similar symptoms in the past.      Further ED work-up revealed chemistries within normal limits. Mild transaminitis with an ALT of 71 and AST of 38. CBC within normal limits. CTA showed no intracranial large vessel occlusion or significant stenosis. No intracranial aneurysm. No significant steno-occlusive disease involving the carotid or vertebral arteries. She was found to have fetal origin of the left posterior cerebral artery. CTA of the head showed no acute intracranial process. Hospitalists are being requested for admission medical management of the patient with TIAs and right-sided numbness with a neurology consult.         Past Medical History:        Diagnosis Date    Allergic rhinitis 11/10/2021    Anxiety 6/4/2020    Asthma     Chronic back pain 7/29/2018    Chronic migraine without aura, intractable, without status migrainosus     Colon polyps     Depression 7/29/2018    Elevated blood pressure, situational 5/29/2018    Essential tremor     Fibromyalgia     Liver disease     Fatty Liver    Meniere disease 2017    Osteoarthritis     Sciatica of right side 2020    Tinnitus        Past Surgical History:        Procedure Laterality Date     SECTION      CHOLECYSTECTOMY, LAPAROSCOPIC  2015    Dr. Hernandez Marker?    ?   [place] LIVER BIOPSY  2015    Dr. Witt Medications:  Prior to Admission medications    Medication Sig Start Date End Date Taking? Authorizing Provider   amitriptyline (ELAVIL) 25 MG tablet Take 2 tablets by mouth nightly 3/29/22 4/28/22  JUAN JOSE Laguerre   busPIRone (BUSPAR) 5 MG tablet TAKE 1 TABLET BY MOUTH THREE TIMES DAILY AS NEEDED FOR ANXIETY (NO FURTHER REFILLS UNTIL SEEN IN OFFICE) 3/29/22   JUAN JOSE Laguerre   celecoxib (CELEBREX) 200 MG capsule Take 1 capsule by mouth daily 3/29/22 4/28/22  JUAN JOSE Laguerre   desvenlafaxine succinate (PRISTIQ) 50 MG TB24 extended release tablet TAKE 1 TABLET BY MOUTH ONCE DAILY 3/29/22   JUAN JOSE Laguerre   fluticasone Zanoni Sigrid) 50 MCG/ACT nasal spray 1 spray by Nasal route daily 3/29/22 11/24/22  JUAN JOSE Laguerre   gabapentin (NEURONTIN) 300 MG capsule TAKE 1 CAPSULE BY MOUTH NIGHTLY 3/29/22 8/16/22  JUAN JOSE Laguerre   hydroCHLOROthiazide (HYDRODIURIL) 25 MG tablet Take 1 tablet by mouth daily 3/29/22 4/28/22  JUAN JOSE Laguerre   amLODIPine (NORVASC) 2.5 MG tablet Take 1 tablet by mouth nightly 3/29/22 4/28/22  JUAN JOSE Laguerre   triamcinolone (KENALOG) 0.1 % cream Apply topically 2 times daily as needed for itching. Patient not taking: Reported on 2021   JUAN JOSE Box   vitamin E 1000 units capsule Take 1,000 Units by mouth daily    Historical Provider, MD       Allergies:    Latex, Codeine, Morphine, Morphine and related, and Eggs or egg-derived products    Social History:    The patient currently lives at home  Tobacco:   reports that she has never smoked.  She has never used smokeless tobacco.  Alcohol: reports no history of alcohol use. Illicit Drugs: Denies     Family History:      Problem Relation Age of Onset    Colon Polyps Paternal Grandfather     Liver Cancer Paternal Grandfather     Colon Polyps Mother     Liver Cancer Mother     Heart Disease Mother     Cancer Mother     Diabetes Mother     Colon Polyps Maternal Grandmother     Heart Disease Father     Cancer Father     Colon Cancer Neg Hx     Esophageal Cancer Neg Hx     Liver Disease Neg Hx     Rectal Cancer Neg Hx     Stomach Cancer Neg Hx        Review of Systems:   Review of Systems   Constitutional: Negative for chills, diaphoresis, fatigue and fever. HENT: Negative for congestion, ear pain, sinus pain, sore throat and trouble swallowing. Eyes: Negative for visual disturbance. Respiratory: Negative for cough, shortness of breath and wheezing. Cardiovascular: Negative for chest pain, palpitations and leg swelling. Gastrointestinal: Negative for abdominal distention, abdominal pain, blood in stool, constipation, diarrhea, nausea and vomiting. Endocrine: Negative for cold intolerance and heat intolerance. Genitourinary: Negative for difficulty urinating, flank pain, frequency and urgency. Musculoskeletal: Negative for arthralgias and myalgias. Neurological: Positive for weakness and numbness. Negative for dizziness, syncope, light-headedness and headaches. Hematological: Does not bruise/bleed easily. Psychiatric/Behavioral: Negative for agitation, confusion and dysphoric mood. 14 point review of systems is negative except as specifically addressed above. Physical Examination:  BP (!) 167/98   Pulse 77   Temp 98.2 °F (36.8 °C) (Oral)   Resp 16   SpO2 95%   Physical Exam  Vitals and nursing note reviewed. Constitutional:       General: She is not in acute distress. Appearance: Normal appearance. She is not toxic-appearing or diaphoretic. HENT:      Head: Normocephalic and atraumatic.       Right Ear: External ear normal.      Left Ear: External ear normal.      Nose: Nose normal. No congestion or rhinorrhea. Mouth/Throat:      Mouth: Mucous membranes are moist.      Pharynx: Oropharynx is clear. Eyes:      General: No scleral icterus. Extraocular Movements: Extraocular movements intact. Conjunctiva/sclera: Conjunctivae normal.   Cardiovascular:      Rate and Rhythm: Normal rate and regular rhythm. Pulses: Normal pulses. Heart sounds: Normal heart sounds. No murmur heard. No friction rub. No gallop. Pulmonary:      Effort: Pulmonary effort is normal. No respiratory distress. Breath sounds: Normal breath sounds. No wheezing, rhonchi or rales. Abdominal:      General: Abdomen is flat. Bowel sounds are normal. There is no distension. Palpations: Abdomen is soft. Tenderness: There is no abdominal tenderness. Musculoskeletal:         General: No swelling. Normal range of motion. Cervical back: Normal range of motion and neck supple. Right lower leg: No edema. Left lower leg: No edema. Skin:     General: Skin is warm and dry. Coloration: Skin is not jaundiced. Findings: No erythema, lesion or rash. Neurological:      General: No focal deficit present. Mental Status: She is alert and oriented to person, place, and time. Mental status is at baseline. Cranial Nerves: No cranial nerve deficit. Sensory: No sensory deficit. Motor: No weakness. Psychiatric:         Mood and Affect: Mood normal.         Behavior: Behavior normal.         Thought Content:  Thought content normal.         Judgment: Judgment normal.          Diagnostic Data:  CBC:  Recent Labs     05/08/22  1225   WBC 7.5   HGB 15.7   HCT 46.9        BMP:  Recent Labs     05/08/22  1225      K 4.2      CO2 31*   BUN 8   CREATININE 0.7   CALCIUM 10.0     Recent Labs     05/08/22  1225   AST 38*   ALT 71*   BILITOT 0.3   ALKPHOS 64 Urinalysis:  Lab Results   Component Value Date    NITRU Negative 06/06/2018    BLOODU Negative 06/06/2018    SPECGRAV 1.027 06/06/2018    GLUCOSEU Negative 06/06/2018       CT Head WO Contrast    Result Date: 5/8/2022  EXAMINATION: CT HEAD WO CONTRAST 5/8/2022 2:56 PM HISTORY: Right-sided numbness for 5 days intermittently. Stroke symptoms. DOSE: 839 mGycm. Automatic exposure control was utilized in an effort to use as little radiation as possible, without compromising image quality. REPORT: Spiral CT of the head was performed without intravenous contrast. Reconstructed coronal and sagittal images were also obtained. COMPARISON: CT head without contrast 10/24/2018. No evidence of intracranial hemorrhage, mass or mass-effect is identified. The ventricles and basal cisterns appear within normal limits. There is an \"empty\" sella as before. This can be seen as a normal variant. Bone windows are unremarkable. Impression: No acute intracranial abnormality. Signed by Dr Hetal Rojas. [place]    CTA HEAD NECK W CONTRAST    Result Date: 5/8/2022  EXAMINATION: CTA HEAD NECK W CONTRAST 5/8/2022 3:30 PM HISTORY: Intermittent right-sided facial, upper and lower 70 paresthesias for 5 days, increasing frequency. DOSE: 640 mGycm. Automatic exposure control was utilized in an effort to use as little radiation as possible, without compromising image quality. REPORT: Spiral CT of the head and neck was performed after administration of intravenous contrast, using CTA protocol, which includes reconstructed coronal, sagittal and 3-D images. COMPARISON: Noncontrast CT of the head 5/8/2022. NASCET criteria is utilized for determination of carotid stenoses. Review of lung windows demonstrates normal aeration of the visualized upper lobes, there is symmetric subpleural opacity at the apices, most compatible with chronic fibrosis. Caliber of the aortic arch is normal. No aortic dissection is seen involving the visualized thoracic aorta. The right common carotid artery is patent, normal caliber, without evidence of dissection. There is no significant plaque deposition at the right carotid bifurcation, with 0% stenosis. The right internal and external carotid arteries are patent. The left common carotid artery is patent, normal caliber, without evidence of dissection. There is no significant plaque deposition at the left carotid bifurcation, with 0% stenosis. Bilateral vertebral arteries are patent, neither vertebral artery appears dominant, no evidence of vertebral dissection is identified. Intracranially, bilateral PICA are patent, the basilar artery is patent and normal in caliber. Distal branch vessels of the basilar artery are patent, there is fetal origin of the left posterior cerebral artery, a congenital variant. The right posterior communicating artery is patent. In the anterior circulation, the middle and anterior cerebral arteries are patent and appear normal in caliber, there is no large vessel occlusion or high-grade stenosis. No intracranial aneurysm is identified. Review of soft tissue windows demonstrates no neck mass or evidence of lymphadenopathy. The airway is patent. There is normal aeration of the paranasal sinuses and mastoid air cells. Review of bone windows shows no acute osseous abnormality. 1. No intracranial arterial large vessel occlusion or significant stenosis is identified. No intracranial aneurysm. There is fetal origin of the left posterior cerebral artery, a normal variant. 2. No significant steno-occlusive disease involving the carotid or vertebral arteries. No evidence of carotid or vertebral dissection. Signed by Dr Slade Flores.  John Paul      Assessment/Plan:  Principal Problem:    Transient ischemic attack (TIA)   -Admit with tele   -2D echo   -NPO until evaluated by SLP-then may advance diet as tolerated to regular diet   -Consult PT/OT   -NIHSS daily   -Allow for permissive HTN   -MRI of the brain  without contrast   -VTE prophylaxis   -Up with assist   -Vitals per unit routine   -I&O per unit routine   -Neurology consult   -Labs in the AM   -Continue to monitor for clinical changes    Active Problems:    Hepatic steatosis   -Noted   -Mild transaminitis noted on labs   -Monitor      Depression   -Noted   -Continue home meds      Hypertension, essential, benign   -Noted   -Continue home meds   -Allow permissive HTN    Resolved Problems:    * No resolved hospital problems.  *       Signed:  JUAN JOSE Balderrama, 5/8/2022 3:52 PM

## 2022-05-09 ENCOUNTER — APPOINTMENT (OUTPATIENT)
Dept: GENERAL RADIOLOGY | Age: 52
End: 2022-05-09
Payer: COMMERCIAL

## 2022-05-09 ENCOUNTER — APPOINTMENT (OUTPATIENT)
Dept: CT IMAGING | Age: 52
End: 2022-05-09
Payer: COMMERCIAL

## 2022-05-09 VITALS
BODY MASS INDEX: 24.99 KG/M2 | WEIGHT: 150 LBS | RESPIRATION RATE: 16 BRPM | DIASTOLIC BLOOD PRESSURE: 86 MMHG | SYSTOLIC BLOOD PRESSURE: 123 MMHG | HEART RATE: 77 BPM | OXYGEN SATURATION: 94 % | TEMPERATURE: 97.7 F | HEIGHT: 65 IN

## 2022-05-09 LAB
AMPHETAMINE SCREEN, URINE: NEGATIVE
ANION GAP SERPL CALCULATED.3IONS-SCNC: 10 MMOL/L (ref 7–19)
APTT: 30.3 SEC (ref 26–36.2)
BARBITURATE SCREEN URINE: NEGATIVE
BASOPHILS ABSOLUTE: 0.1 K/UL (ref 0–0.2)
BASOPHILS RELATIVE PERCENT: 1.1 % (ref 0–1)
BENZODIAZEPINE SCREEN, URINE: NEGATIVE
BILIRUBIN URINE: NEGATIVE
BLOOD, URINE: NEGATIVE
BUN BLDV-MCNC: 7 MG/DL (ref 6–20)
C-REACTIVE PROTEIN: <0.3 MG/DL (ref 0–0.5)
CALCIUM SERPL-MCNC: 9 MG/DL (ref 8.6–10)
CANNABINOID SCREEN URINE: NEGATIVE
CHLORIDE BLD-SCNC: 102 MMOL/L (ref 98–111)
CHOLESTEROL, TOTAL: 176 MG/DL (ref 160–199)
CLARITY: CLEAR
CO2: 27 MMOL/L (ref 22–29)
COCAINE METABOLITE SCREEN URINE: NEGATIVE
COLOR: YELLOW
CREAT SERPL-MCNC: 0.6 MG/DL (ref 0.5–0.9)
EKG P AXIS: 36 DEGREES
EKG P AXIS: 37 DEGREES
EKG P-R INTERVAL: 160 MS
EKG P-R INTERVAL: 164 MS
EKG Q-T INTERVAL: 418 MS
EKG Q-T INTERVAL: 418 MS
EKG QRS DURATION: 82 MS
EKG QRS DURATION: 82 MS
EKG QTC CALCULATION (BAZETT): 435 MS
EKG QTC CALCULATION (BAZETT): 439 MS
EKG T AXIS: 42 DEGREES
EKG T AXIS: 51 DEGREES
EOSINOPHILS ABSOLUTE: 0.2 K/UL (ref 0–0.6)
EOSINOPHILS RELATIVE PERCENT: 2.4 % (ref 0–5)
FOLATE: 13.8 NG/ML (ref 4.8–37.3)
GFR AFRICAN AMERICAN: >59
GFR NON-AFRICAN AMERICAN: >60
GLUCOSE BLD-MCNC: 118 MG/DL (ref 74–109)
GLUCOSE BLD-MCNC: 123 MG/DL (ref 70–99)
GLUCOSE URINE: NEGATIVE MG/DL
HCT VFR BLD CALC: 44.8 % (ref 37–47)
HDLC SERPL-MCNC: 28 MG/DL (ref 65–121)
HEMOGLOBIN: 15 G/DL (ref 12–16)
IMMATURE GRANULOCYTES #: 0 K/UL
INR BLD: 0.94 (ref 0.88–1.18)
KETONES, URINE: NEGATIVE MG/DL
LDL CHOLESTEROL CALCULATED: 84 MG/DL
LEUKOCYTE ESTERASE, URINE: NEGATIVE
LV EF: 68 %
LVEF MODALITY: NORMAL
LYMPHOCYTES ABSOLUTE: 2.5 K/UL (ref 1.1–4.5)
LYMPHOCYTES RELATIVE PERCENT: 31.6 % (ref 20–40)
Lab: NORMAL
MAGNESIUM: 2.3 MG/DL (ref 1.6–2.6)
MCH RBC QN AUTO: 30.4 PG (ref 27–31)
MCHC RBC AUTO-ENTMCNC: 33.5 G/DL (ref 33–37)
MCV RBC AUTO: 90.7 FL (ref 81–99)
MONOCYTES ABSOLUTE: 0.7 K/UL (ref 0–0.9)
MONOCYTES RELATIVE PERCENT: 8.1 % (ref 0–10)
NEUTROPHILS ABSOLUTE: 4.5 K/UL (ref 1.5–7.5)
NEUTROPHILS RELATIVE PERCENT: 56.4 % (ref 50–65)
NITRITE, URINE: NEGATIVE
OPIATE SCREEN URINE: NEGATIVE
PDW BLD-RTO: 11.9 % (ref 11.5–14.5)
PERFORMED ON: ABNORMAL
PH UA: 7.5 (ref 5–8)
PLATELET # BLD: 362 K/UL (ref 130–400)
PMV BLD AUTO: 10.6 FL (ref 9.4–12.3)
POTASSIUM SERPL-SCNC: 3.9 MMOL/L (ref 3.5–5)
PROTEIN UA: NEGATIVE MG/DL
PROTHROMBIN TIME: 12.5 SEC (ref 12–14.6)
RBC # BLD: 4.94 M/UL (ref 4.2–5.4)
SEDIMENTATION RATE, ERYTHROCYTE: 13 MM/HR (ref 0–25)
SODIUM BLD-SCNC: 139 MMOL/L (ref 136–145)
SPECIFIC GRAVITY UA: 1 (ref 1–1.03)
TOTAL CK: 40 U/L (ref 26–192)
TRIGL SERPL-MCNC: 319 MG/DL (ref 0–149)
TROPONIN: <0.01 NG/ML (ref 0–0.03)
TSH SERPL DL<=0.05 MIU/L-ACNC: 3.56 UIU/ML (ref 0.27–4.2)
UROBILINOGEN, URINE: 0.2 E.U./DL
VITAMIN B-12: 446 PG/ML (ref 211–946)
WBC # BLD: 8 K/UL (ref 4.8–10.8)

## 2022-05-09 PROCEDURE — 85307 ASSAY ACTIVATED PROTEIN C: CPT

## 2022-05-09 PROCEDURE — 84443 ASSAY THYROID STIM HORMONE: CPT

## 2022-05-09 PROCEDURE — 6370000000 HC RX 637 (ALT 250 FOR IP): Performed by: INTERNAL MEDICINE

## 2022-05-09 PROCEDURE — 92610 EVALUATE SWALLOWING FUNCTION: CPT

## 2022-05-09 PROCEDURE — 85610 PROTHROMBIN TIME: CPT

## 2022-05-09 PROCEDURE — 85025 COMPLETE CBC W/AUTO DIFF WBC: CPT

## 2022-05-09 PROCEDURE — 82550 ASSAY OF CK (CPK): CPT

## 2022-05-09 PROCEDURE — 6370000000 HC RX 637 (ALT 250 FOR IP): Performed by: NURSE PRACTITIONER

## 2022-05-09 PROCEDURE — 85730 THROMBOPLASTIN TIME PARTIAL: CPT

## 2022-05-09 PROCEDURE — 85652 RBC SED RATE AUTOMATED: CPT

## 2022-05-09 PROCEDURE — 81241 F5 GENE: CPT

## 2022-05-09 PROCEDURE — 36415 COLL VENOUS BLD VENIPUNCTURE: CPT

## 2022-05-09 PROCEDURE — 97161 PT EVAL LOW COMPLEX 20 MIN: CPT

## 2022-05-09 PROCEDURE — 81240 F2 GENE: CPT

## 2022-05-09 PROCEDURE — 80061 LIPID PANEL: CPT

## 2022-05-09 PROCEDURE — 2580000003 HC RX 258: Performed by: HOSPITALIST

## 2022-05-09 PROCEDURE — 82607 VITAMIN B-12: CPT

## 2022-05-09 PROCEDURE — 84484 ASSAY OF TROPONIN QUANT: CPT

## 2022-05-09 PROCEDURE — 97535 SELF CARE MNGMENT TRAINING: CPT

## 2022-05-09 PROCEDURE — G0378 HOSPITAL OBSERVATION PER HR: HCPCS

## 2022-05-09 PROCEDURE — 82947 ASSAY GLUCOSE BLOOD QUANT: CPT

## 2022-05-09 PROCEDURE — 80048 BASIC METABOLIC PNL TOTAL CA: CPT

## 2022-05-09 PROCEDURE — 86146 BETA-2 GLYCOPROTEIN ANTIBODY: CPT

## 2022-05-09 PROCEDURE — 86140 C-REACTIVE PROTEIN: CPT

## 2022-05-09 PROCEDURE — 80307 DRUG TEST PRSMV CHEM ANLYZR: CPT

## 2022-05-09 PROCEDURE — 86147 CARDIOLIPIN ANTIBODY EA IG: CPT

## 2022-05-09 PROCEDURE — 84630 ASSAY OF ZINC: CPT

## 2022-05-09 PROCEDURE — 85306 CLOT INHIBIT PROT S FREE: CPT

## 2022-05-09 PROCEDURE — 93010 ELECTROCARDIOGRAM REPORT: CPT | Performed by: INTERNAL MEDICINE

## 2022-05-09 PROCEDURE — 70450 CT HEAD/BRAIN W/O DYE: CPT

## 2022-05-09 PROCEDURE — 85300 ANTITHROMBIN III ACTIVITY: CPT

## 2022-05-09 PROCEDURE — 85303 CLOT INHIBIT PROT C ACTIVITY: CPT

## 2022-05-09 PROCEDURE — 82746 ASSAY OF FOLIC ACID SERUM: CPT

## 2022-05-09 PROCEDURE — 99233 SBSQ HOSP IP/OBS HIGH 50: CPT | Performed by: PSYCHIATRY & NEUROLOGY

## 2022-05-09 PROCEDURE — 97165 OT EVAL LOW COMPLEX 30 MIN: CPT

## 2022-05-09 PROCEDURE — 93306 TTE W/DOPPLER COMPLETE: CPT

## 2022-05-09 PROCEDURE — 93005 ELECTROCARDIOGRAM TRACING: CPT | Performed by: INTERNAL MEDICINE

## 2022-05-09 PROCEDURE — 92523 SPEECH SOUND LANG COMPREHEN: CPT

## 2022-05-09 PROCEDURE — 71045 X-RAY EXAM CHEST 1 VIEW: CPT

## 2022-05-09 PROCEDURE — 83735 ASSAY OF MAGNESIUM: CPT

## 2022-05-09 PROCEDURE — 85613 RUSSELL VIPER VENOM DILUTED: CPT

## 2022-05-09 PROCEDURE — 83090 ASSAY OF HOMOCYSTEINE: CPT

## 2022-05-09 PROCEDURE — 81003 URINALYSIS AUTO W/O SCOPE: CPT

## 2022-05-09 RX ORDER — ASPIRIN 81 MG/1
81 TABLET ORAL DAILY
Qty: 30 TABLET | Refills: 3 | Status: SHIPPED | OUTPATIENT
Start: 2022-05-10 | End: 2022-05-11

## 2022-05-09 RX ORDER — SODIUM CHLORIDE 9 MG/ML
INJECTION, SOLUTION INTRAVENOUS CONTINUOUS
Status: DISCONTINUED | OUTPATIENT
Start: 2022-05-09 | End: 2022-05-09 | Stop reason: HOSPADM

## 2022-05-09 RX ORDER — ROSUVASTATIN CALCIUM 40 MG/1
40 TABLET, COATED ORAL NIGHTLY
Qty: 30 TABLET | Refills: 3 | Status: SHIPPED | OUTPATIENT
Start: 2022-05-09 | End: 2022-09-08 | Stop reason: SDUPTHER

## 2022-05-09 RX ORDER — PANTOPRAZOLE SODIUM 20 MG/1
20 TABLET, DELAYED RELEASE ORAL DAILY
Qty: 30 TABLET | Refills: 3 | Status: SHIPPED | OUTPATIENT
Start: 2022-05-09

## 2022-05-09 RX ORDER — CLOPIDOGREL BISULFATE 75 MG/1
75 TABLET ORAL DAILY
Qty: 30 TABLET | Refills: 0 | Status: SHIPPED | OUTPATIENT
Start: 2022-05-09 | End: 2022-05-11 | Stop reason: SDUPTHER

## 2022-05-09 RX ORDER — AMITRIPTYLINE HYDROCHLORIDE 10 MG/1
10 TABLET, FILM COATED ORAL NIGHTLY
Status: DISCONTINUED | OUTPATIENT
Start: 2022-05-09 | End: 2022-05-09 | Stop reason: HOSPADM

## 2022-05-09 RX ADMIN — ASPIRIN 81 MG: 81 TABLET, COATED ORAL at 09:57

## 2022-05-09 RX ADMIN — DESVENLAFAXINE 50 MG: 50 TABLET, EXTENDED RELEASE ORAL at 09:57

## 2022-05-09 RX ADMIN — SODIUM CHLORIDE: 9 INJECTION, SOLUTION INTRAVENOUS at 17:00

## 2022-05-09 RX ADMIN — OXYCODONE HYDROCHLORIDE AND ACETAMINOPHEN 500 MG: 500 TABLET ORAL at 09:57

## 2022-05-09 ASSESSMENT — ENCOUNTER SYMPTOMS
VOMITING: 0
COUGH: 0
PHOTOPHOBIA: 0
SHORTNESS OF BREATH: 0
NAUSEA: 0
BACK PAIN: 0

## 2022-05-09 NOTE — DISCHARGE INSTR - DIET

## 2022-05-09 NOTE — PROGRESS NOTES
Upon walking to bathroom, pt verbalized numbness to right side of tongue, numbness, tingling to right arm and hand, and tingling to right leg/foot. Walked pt back from bathroom, she stated that numbness persisting to right side of face, and right hand felt weaker than left. neuros rechecked, pt alert, oriented x 4.  pt noted to have a weaker  in right hand than left. Code stroke called, ekg, stat ct of head ordered per Dr. Guillermina Steiner.

## 2022-05-09 NOTE — PROGRESS NOTES
[facility]  Personalized Stroke Treatment Plan  My Stroke Type:   [] Ischemic Stroke (Blockage of blood flow to the brain)     [x] TIA - Transient Ischemic Attack (mini-stroke)    Personal risk factors you can control include:    [] Alcohol Abuse: check with your physician before any alcohol consumption. [] Atrial fibrillation: may cause blood clots. [] Drug Abuse: Seek help, talk with your doctor  [] Clotting Disorder  [] Diabetes  [] Family history of stroke or heart disease  [x] High Blood Pressure/Hypertension: work with your physician.  [] High cholesterol: monitor cholesterol levels with your physician.   [] Overweight/Obesity: work with your physician for your ideal body weight.  [] Physical Inactivity: get regular exercise as directed by your physician. [] Personal history of previous TIA or stroke  [] Poor Diet; decrease salt (sodium) in your diet, follow diet directed by physician. [] Smoking: Cigarette/Cigar: stop smoking. Follow up with your physician is important after discharge. TAKE all medications as prescribed. Do not stop taking any medications   without talking to your physician. FAST is a simple way to remember the main symptoms of stroke. Recognizing these symptoms helps you know when to call for medical help. FAST stands for:        F  Face Drooping      A  Arm Weakness        S  Speech Difficulty      T  Time to Call 9-1-1  DO NOT DELAY THIS! Educated patient and/or family on personal risk factors for stroke/TIA. Included ways to reduce the risk for recurrent stroke. [facility] Stroke treatment and prevention, Managing your recovery  notebook  provided to patient. The notebook includes, but not limited to, sections addressing warning signs & symptoms of a stroke. The need to call EMS (911) immediately if signs & symptoms occur is emphasized . Medication information will be reviewed at discharge.   The notebook also provides education on Stroke community resources and stroke advocacy.     Electronically signed by Electronically signed by Gia Duff RN on 5/8/2022 at 10:56 PM

## 2022-05-09 NOTE — PROGRESS NOTES
Occupational Therapy Initial Assessment  Date: 2022   Patient Name: Cirilo Drake  MRN: [id number]     : 1970    Date of Service: 2022    Discharge Recommendations:  24 hour supervision or assist  OT Equipment Recommendations  Equipment Needed:  (shower chair could benefit pt if episodes do not resolve; up to pt/family)    Assessment   Assessment: OT evaluation completed. Pt does not display any deficits that would warrant further OT services in this setting. Pt does have some episodes of decreased safety/functional performance which cannot be resolved by OT. OT does not anticipate any environmental barriers to D/C home once medically cleared if 24/ assist/supervision is available for safety. OT did advise pt over basic safety precautions until medical condition stabilizes. If pt's condition worsens, new OT orders will be required. REQUIRES OT FOLLOW-UP: No  Activity Tolerance  Activity Tolerance: Patient Tolerated treatment well              Patient Diagnosis(es): The encounter diagnosis was TIA (transient ischemic attack). Restrictions  Restrictions/Precautions  Restrictions/Precautions: Fall Risk Neo Melendezi Fall Score 35 per chart review)  Required Braces or Orthoses?: No    Subjective   General  Chart Reviewed: Yes  Patient assessed for rehabilitation services?: Yes  Additional Pertinent Hx: anxiety; depression; fibro; meniere's dx; OA; R-sided sciatica; chronic back pain  Family / Caregiver Present: Yes ()  Diagnosis: TIA  Pre Treatment Pain Screening  Pain at present: 0  Scale Used: Numeric Score  Intervention List: Patient able to continue with treatment    Social/Functional History  Social/Functional History  Lives With: Spouse  Has the patient had two or more falls in the past year or any fall with injury in the past year? :  (pt reports having near fall this AM during episode)  ADL Assistance: Independent  Homemaking Assistance: Independent  Ambulation Assistance: Independent  Transfer Assistance: Independent  Active : Yes  Additional Comments: Pt reported that these episodes of R-sided numbness, tingling, and discoordination have happened 5x day. Objective      Vision Exceptions: Wears glasses at all times  Hearing: Within functional limits          Toilet Transfers  Toilet - Technique: Ambulating  Equipment Used: Raised toilet seat with rails  Toilet Transfer: Stand by assistance  Toilet Transfers Comments: SBA for safety; performed independently  ADL  Feeding: Independent;Setup  Grooming: Independent;Setup  UE Bathing: Supervision  UE Bathing Skilled Clinical Factors: Suggesting supervision at home when showering until episodes resolve. LE Bathing: Supervision  LE Bathing Skilled Clinical Factors: Suggesting supervision at home when showering until episodes resolve. UE Dressing: Independent  LE Dressing: Independent  Toileting: Independent        Bed mobility  Supine to Sit: Independent  Sit to Supine: Independent  Transfers  Stand Step Transfers: Stand by assistance  Sit to stand: Stand by assistance  Stand to sit: Stand by assistance  Transfer Comments: SBA for safety; performed independently     Cognition  Overall Cognitive Status: WNL               Gross Assessment  AROM: Within functional limits  Strength: Within functional limits (slightly weaker in R hand vs L for )  Coordination: Within functional limits  Sensation: Intact (currently)  AROM: Within functional limits  Strength: Within functional limits (slightly weaker in R hand vs L for )  Coordination: Within functional limits  Sensation: Intact (currently)                    Included Treatment  Tx consisted of: bed mobility; pt/family education; transfer training; DME/AE training; activity tolerance/balance challenges; functional ambulation; and simulation of dressing and toileting skills.    (Treatment time: 15 min)        Plan   Plan  Plan Comment: N/A; EVAL ONLY    Goals  Short Term Goals  Short Term Goal 1: Pt/family will verbalize/demo: AE/DME options; recommended therapeutic activities; homemaking/IADL strategies; energy conservation techniques; and fall prevention strategies (GOAL MET).                Meli Steiner OTR/L  Electronically signed by Meli LUEVANO/L on 5/9/2022 at 11:12 AM.

## 2022-05-09 NOTE — PROGRESS NOTES
Physical Therapy  Facility/Department: [facility] SURG SERVICES  Physical Therapy Initial Assessment    Name: Jackson Hankins  : 1970  MRN: [id number]  Date of Service: 2022    Discharge Recommendations:  Home with assist PRN   PT Equipment Recommendations  Equipment Needed: No      Patient Diagnosis(es): The encounter diagnosis was TIA (transient ischemic attack). Past Medical History:  has a past medical history of Allergic rhinitis, Anxiety, Asthma, Chronic back pain, Chronic migraine without aura, intractable, without status migrainosus, Colon polyps, Depression, Elevated blood pressure, situational, Essential tremor, Fibromyalgia, Liver disease, Meniere disease, Osteoarthritis, Sciatica of right side, and Tinnitus. Past Surgical History:  has a past surgical history that includes Hysterectomy;  section; Colonoscopy (); liver biopsy (2015); and Cholecystectomy, laparoscopic (2015). Assessment   Assessment: Pt WAS NOT EXPERIENCING  DIZZINESS OF TIME OF EVAL AND WAS IND WITH MOBILITY AND DID NOT DEMONSTRATE NEED FOR ADDITIONAL PT SERVICES IN THIS SETTING. Therapy Prognosis: Good  Decision Making: Low Complexity  No Skilled PT: No PT goals identified  Requires PT Follow-Up: No  Activity Tolerance  Activity Tolerance: Patient tolerated treatment well     Plan   Plan  Plan: Discharge with evaluation only  Safety Devices  Type of Devices: Left in bed,Call light within reach,Bed alarm in place     Restrictions  Restrictions/Precautions  Restrictions/Precautions: Fall Risk Wilda Ewelina Fall Score 35 per chart review)  Required Braces or Orthoses?: No     Subjective   General  Patient assessed for rehabilitation services?: Yes  Diagnosis: TIA WITH EPIOSDES OF R SIDE NUMBNESS  Follows Commands: Within Functional Limits  Subjective  Subjective: Pt REPORTS SHE IS ABLE TO FUNCTION WELL WITH MOBILITY/ADL'S UNTIL SHE HAS AN EPISODE OF R SIDE NUMBNESS. C/O UNSTEADY GT WHEN R LE NUMBNESS IS PRESENT.  STATES THESE EPIOSDES CAN HAPPEN UP TO 5X/DAY AND LAST A FEW SEC TO A COUPLE OF MIN. Pt EXPRESSES CONCERN THAT THIS MIGHT BE RELATED TO HER SPINE ESPECIALLY IN LIGHT OF THE FACT THAT TURNING HER HEAD PRECIPATES THESE EPISODES. Social/Functional History  Social/Functional History  Lives With: Spouse  Has the patient had two or more falls in the past year or any fall with injury in the past year? :  (pt reports having near fall this AM during episode)  ADL Assistance: Independent  Homemaking Assistance: Independent  Ambulation Assistance: Independent  Transfer Assistance: Independent  Active : Yes  Additional Comments: Pt reported that these episodes of R-sided numbness, tingling, and discoordination have happened 5x day. Vision/Hearing  Hearing: Within functional limits    Cognition   Orientation  Overall Orientation Status: Within Normal Limits  Cognition  Overall Cognitive Status: WNL     Objective   Pulse: 68  Heart Rate Source: Monitor  BP: (!) 130/93  BP Location: Left upper arm  Patient Position: Sitting  MAP (Calculated): 105.33  Resp: 16  SpO2: 97 %  O2 Device: None (Room air)        Gross Assessment  AROM: Within functional limits  PROM: Within functional limits  Strength: Within functional limits  Coordination: Within functional limits  Tone: Normal  Sensation: Intact (INTACT AT TIME OF EVAL. HAD AN EPISODE OF NUMBNESS EARLIER TODAY)                    Bed mobility  Supine to Sit: Independent  Sit to Supine: Independent  Transfers  Comment: APPEARS SAFE AND IND WHEN NOT EXPERIENCING NUMBNESS IN R LE  Ambulation  Surface: level tile  Device: No Device  Assistance: Stand by assistance;Contact guard assistance  Gait Deviations: None  Distance: 100 FT. AT TIME OF EVAL, pt COULD BE IND WITH AMB.  ADVISED HER TO NOT AMB DURING EPISODES OF DIZZINESS     Balance  Sitting - Static: Good  Sitting - Dynamic: Good  Standing - Static: Good  Standing - Dynamic: Good             Goals  Short Term Goals  Time Frame for Short term goals: EVAL ONLY       Therapy Time   Individual Concurrent Group Co-treatment   Time In           Time Out           Minutes                   Svetlana Segura PT    Electronically signed by Svetlana Segura PT on 5/9/2022 at 11:17 AM

## 2022-05-09 NOTE — CARE COORDINATION
Date / Time of Evaluation: 5/9/2022 3:26 PM  Assessment Completed by: Masood Peace RN, BSN    Patient Admission Status:     [address] [phone] (home)   Telephone Information:   Mobile [phone]       (Best Practice:  Have patient / caregiver verify above address and phone number by stating out loud their current address and reachable phone number.)  Is above information correct? yes      Current PCP:  JUAN JOSE Rouse    Initial Assessment Completed at bedside with:  patient, family    Emergency Contacts:  Extended Emergency Contact Information  Primary Emergency Contact: Igor Alvarado  Address: [facility], [address] Phone: [phone]  Relation: Spouse  Secondary Emergency Contact: Lenard [address] Phone: [phone]  Relation: Child    Advance Directives: Code Status:  Full Code    Financial:  Payor: UMR / Plan: UMR  / Product Type: *No Product type* /     Pre-Cert required for SNF:  yes    Pharmacy:   [address] [place] [address] [place] [address]  Phone: [phone] Fax: [phone]      Potential assistance purchasing medications? No    ADLS:  Support System:  Family/friends    Current Home Environment:  house  Steps:  Yes, 3 steps no handrails    Plans to RETURN to current housing: Yes  Barriers to RETURNING to current housing:  No    Currently ACTIVE with Home Health CARE:  No  Home Health Care Agency:  N/A    DME Provider:  N/A     Transition Plan:  Home with spouse    Transportation PLAN for Discharge:  spouse    Additional CM/SW Notes:   I spoke with patient and spouse at bedside. She states that she lives at home with spouse. She does not use any DME or utilize home healthcare. I gave her the Stroke Booklet and pamphlets. I reviewed with her. Patient states understanding.  No needs identified at this time. Damian Baca and/or her family were provided with choice of provider.   NO    Vincent Reyes RN, BSN  [facility]  Care Management Department  Ph:  [phone] Fax: [phone]    Electronically signed by Vincent Reyes RN, BSN on 5/9/2022 at 3:39 PM

## 2022-05-09 NOTE — PROGRESS NOTES
Speech Language Pathology  Facility/Department: [facility] SURG SERVICES  Initial Speech/Language/Cognitive/Swallow Assessment    NAME: Karen Bledsoe  : 1970   MRN: [id number]  ADMISSION DATE: 2022  ADMITTING DIAGNOSIS: has Irritable bowel syndrome with diarrhea; Transaminitis; Hepatic steatosis; History of liver biopsy; Chronic bronchitis ([facility] [address] Tremor; Moderate persistent asthma without complication; Vitamin D deficiency; Essential tremor; Chronic migraine without aura, intractable, with status migrainosus; Chronic migraine without aura, intractable, without status migrainosus; Fatigue; Chest pain, exertional; COPD (chronic obstructive pulmonary disease) with chronic bronchitis ([facility] [address] Prediabetes; Elevated blood pressure, situational; Fibromyalgia; Other chronic pain; Chronic back pain; Insomnia; Vaginal atrophy; Hormone deficiency; Depression; Anxiety; Sciatica of right side; Hypertension, essential, benign; Breast cancer screening by mammogram; Chronic bilateral low back pain with sciatica; Allergic rhinitis; and Transient ischemic attack (TIA) on their problem list.    Date of Eval: 2022   Evaluating Therapist: KRISTYN Dash    Assessment:  Completed assessment. Patient exhibited clear speech production with functional intelligibility for unfamiliar listeners measured at 100% in conversation. No significant deficits were noted in the areas of attention, auditory comprehension, verbal expression, orientation, memory, or reasoning/problem solving. Also evaluated patient's swallowing function. Patient exhibited functional oral and pharyngeal stages of swallowing and no outward S/S penetration/aspiration was observed with any regular solid consistency trial or thin H2O trial presented during assessment this date. At this time, would recommend continuation regular solid consistency with thin liquids. Self-feed. Thank you for this consult.     Subjective:  General  Chart Reviewed: Yes  Patient assessed for rehabilitation services?: Yes  Family / Caregiver Present: No    Objective:  Oral Motor:  (Structures and related functions were considered to be [facility].)    Auditory Comprehension  Comprehension:  (Patient demonstrated ability to answer simple yes/no questions regarding immediate environment and current state of being at independent level and with adequate processing speed. Patient demonstrated ability to follow simple 1 step commands independently and with adequate processing speed. Patient demonstrated ability to answer yes/no questions of increased complexity and to follow complex 1 and simple 2 step commands at independent level and with adequate processing speed.)    Expression  Primary Mode of Expression:  (Confrontation naming of items in room was considered to be [facility]. Structured responsive speech was considered to be functional. Responses in natural conversation were considered to be timely and appropriate.)    Motor Speech:  (SLP ranked functional intelligibility of speech for unfamiliar listeners at 100% in verbalizations with background noise present.)    Overall Orientation Status:  (Patient demonstrated ability to verbalize name, birthday, age, address, phone number, city, state, hospital, floor, month, THALIA, year, and situation at independent level.)    Attention:  (Patient demonstrated appropriate select and sustained attention during assessment.)    Memory:  (Patient demonstrated appropriate immediate and short-term/working memory during assessment.)    Problem Solving:  (Patient verbalized appropriate solutions to situations that could occur during activities of daily living. )    Additional Assessments:  Assessed patient's swallowing function.  Patient exhibited functional oral prep and transit of regular solid consistency trials, administered independently, with timing of oral transit primarily measuring 1-2 seconds in length and with min oral cavity residue noted post swallows; residue cleared from the mouth with additional dry swallows. Oral transit of thin H2O trials, administered independently via cup, primarily measured 1 second in length. Laryngeal elevation during swallow initiation was considered to be functional with no outward S/S penetration/aspiration observed with any regular solid consistency trial or thin H2O trial administered during evaluation this date. At this time, recommend continuation regular solid consistency with thin liquids. No further acute speech therapy is felt to be warranted. Patient to be D/C.      Electronically signed by KRISTYN Roy on 5/9/2022 at 1:28 PM

## 2022-05-09 NOTE — PROGRESS NOTES
[facility] Neurology  [address] [place] [address] Drive, [address]  [place], [place] [address]  Phone [phone]     Neurology Progress Note  2022 9:27 AM  Information:   Patient Name: Val Lacey  :   1970  Age:   50 y.o. MRN:   [id number]  Account #:  [de-identified]  Admit Date:   2022  Today:  22     ADMIT DX:   Transient ischemic attack (TIA)    Subjective:     Val Lacey is a 50y.o. year old woman with a history of hypertension who presented to the ER  with recurrent episodes of right sided numbness and tingling over the past 4 to 5 days. Interval History:   She had another episode of right sided numbness and tingling. She tried to get up with assistance while this was going on and had difficulty.       Objective:     Past Medical History:  Past Medical History:   Diagnosis Date    Allergic rhinitis 11/10/2021    Anxiety 2020    Asthma     Chronic back pain 2018    Chronic migraine without aura, intractable, without status migrainosus     Colon polyps     Depression 2018    Elevated blood pressure, situational 2018    Essential tremor     Fibromyalgia     Liver disease     Fatty Liver    Meniere disease 2017    Osteoarthritis     Sciatica of right side 2020    Tinnitus        Past Surgical History:   Procedure Laterality Date     SECTION      CHOLECYSTECTOMY, LAPAROSCOPIC  2015    Dr. Melissa Mitchell  ?    ?    HYSTERECTOMY      LIVER BIOPSY  2015    Dr. Korey Ty       Family History   Problem Relation Age of Onset    Colon Polyps Paternal Grandfather     Liver Cancer Paternal Grandfather     Colon Polyps Mother     Liver Cancer Mother     Heart Disease Mother     Cancer Mother     Diabetes Mother     Colon Polyps Maternal Grandmother     Heart Disease Father     Cancer Father     Colon Cancer Neg Hx     Esophageal Cancer Neg Hx     Liver Disease Neg Hx     Rectal Cancer Neg Hx     Stomach Cancer Neg Hx        Social History Socioeconomic History    Marital status:      Spouse name: Not on file    Number of children: Not on file    Years of education: Not on file    Highest education level: Not on file   Occupational History    Not on file   Tobacco Use    Smoking status: Never Smoker    Smokeless tobacco: Never Used   Vaping Use    Vaping Use: Never used   Substance and Sexual Activity    Alcohol use: No    Drug use: No    Sexual activity: Not on file   Other Topics Concern    Not on file   Social History Narrative    Not on file     Social Determinants of Health     Financial Resource Strain:     Difficulty of Paying Living Expenses: Not on file   Food Insecurity:     Worried About Running Out of Food in the Last Year: Not on file    Yadira of Food in the Last Year: Not on file   Transportation Needs:     Lack of Transportation (Medical): Not on file    Lack of Transportation (Non-Medical):  Not on file   Physical Activity:     Days of Exercise per Week: Not on file    Minutes of Exercise per Session: Not on file   Stress:     Feeling of Stress : Not on file   Social Connections:     Frequency of Communication with Friends and Family: Not on file    Frequency of Social Gatherings with Friends and Family: Not on file    Attends Zoroastrianism Services: Not on file    Active Member of [address] [place] [address] [employer] or Organizations: Not on file    Attends Club or Organization Meetings: Not on file    Marital Status: Not on file   Intimate Partner Violence:     Fear of Current or Ex-Partner: Not on file    Emotionally Abused: Not on file    Physically Abused: Not on file    Sexually Abused: Not on file   Housing Stability:     Unable to Pay for Housing in the Last Year: Not on file    Number of [place] in the Last Year: Not on file    Unstable Housing in the Last Year: Not on file       Medications:   sodium chloride        amitriptyline  10 mg Oral Nightly    enoxaparin  40 mg SubCUTAneous Q24H    aspirin  81 mg Oral Daily    Or    aspirin  300 mg Rectal Daily    rosuvastatin  40 mg Oral Nightly    desvenlafaxine succinate  50 mg Oral Daily    vitamin C  500 mg Oral Daily       Diagnostic Studies:  Reviewed all labs/diagnositcs since last 24hrs:  Recent Results (from the past 24 hour(s))   CBC with Auto Differential    Collection Time: 05/08/22 12:25 PM   Result Value Ref Range    WBC 7.5 4.8 - 10.8 K/uL    RBC 5.24 4.20 - 5.40 M/uL    Hemoglobin 15.7 12.0 - 16.0 g/dL    Hematocrit 46.9 37.0 - 47.0 %    MCV 89.5 81.0 - 99.0 fL    MCH 30.0 27.0 - 31.0 pg    MCHC 33.5 33.0 - 37.0 g/dL    RDW 11.9 11.5 - 14.5 %    Platelets 431 498 - 402 K/uL    MPV 10.2 9.4 - 12.3 fL    Neutrophils % 53.4 50.0 - 65.0 %    Lymphocytes % 32.8 20.0 - 40.0 %    Monocytes % 9.9 0.0 - 10.0 %    Eosinophils % 2.5 0.0 - 5.0 %    Basophils % 1.1 (H) 0.0 - 1.0 %    Neutrophils Absolute 4.0 1.5 - 7.5 K/uL    Immature Granulocytes # 0.0 K/uL    Lymphocytes Absolute 2.5 1.1 - 4.5 K/uL    Monocytes Absolute 0.70 0.00 - 0.90 K/uL    Eosinophils Absolute 0.20 0.00 - 0.60 K/uL    Basophils Absolute 0.10 0.00 - 0.20 K/uL   Comprehensive Metabolic Panel w/ Reflex to MG    Collection Time: 05/08/22 12:25 PM   Result Value Ref Range    Sodium 141 136 - 145 mmol/L    Potassium reflex Magnesium 4.2 3.5 - 5.0 mmol/L    Chloride 100 98 - 111 mmol/L    CO2 31 (H) 22 - 29 mmol/L    Anion Gap 10 7 - 19 mmol/L    Glucose 109 74 - 109 mg/dL    BUN 8 6 - 20 mg/dL    CREATININE 0.7 0.5 - 0.9 mg/dL    GFR Non-African American >60 >60    GFR African American >59 >59    Calcium 10.0 8.6 - 10.0 mg/dL    Total Protein 6.9 6.6 - 8.7 g/dL    Albumin 4.6 3.5 - 5.2 g/dL    Total Bilirubin 0.3 0.2 - 1.2 mg/dL    Alkaline Phosphatase 64 35 - 104 U/L    ALT 71 (H) 5 - 33 U/L    AST 38 (H) 5 - 32 U/L   Hemoglobin A1c    Collection Time: 05/08/22 12:25 PM   Result Value Ref Range    Hemoglobin A1C 6.2 (H) 4.0 - 6.0 %   Troponin    Collection Time: 05/09/22  2:38 AM   Result Value Ref Range    Troponin <0.01 0.00 - 0.03 ng/mL   Lipid panel - fasting    Collection Time: 05/09/22  3:38 AM   Result Value Ref Range    Cholesterol, Total 176 160 - 199 mg/dL    Triglycerides 319 (H) 0 - 149 mg/dL    HDL 28 (L) 65 - 121 mg/dL    LDL Calculated 84 <100 mg/dL   Basic Metabolic Panel    Collection Time: 05/09/22  3:38 AM   Result Value Ref Range    Sodium 139 136 - 145 mmol/L    Potassium 3.9 3.5 - 5.0 mmol/L    Chloride 102 98 - 111 mmol/L    CO2 27 22 - 29 mmol/L    Anion Gap 10 7 - 19 mmol/L    Glucose 118 (H) 74 - 109 mg/dL    BUN 7 6 - 20 mg/dL    CREATININE 0.6 0.5 - 0.9 mg/dL    GFR Non-African American >60 >60    GFR African American >59 >59    Calcium 9.0 8.6 - 10.0 mg/dL   CBC with Auto Differential    Collection Time: 05/09/22  3:38 AM   Result Value Ref Range    WBC 8.0 4.8 - 10.8 K/uL    RBC 4.94 4.20 - 5.40 M/uL    Hemoglobin 15.0 12.0 - 16.0 g/dL    Hematocrit 44.8 37.0 - 47.0 %    MCV 90.7 81.0 - 99.0 fL    MCH 30.4 27.0 - 31.0 pg    MCHC 33.5 33.0 - 37.0 g/dL    RDW 11.9 11.5 - 14.5 %    Platelets 068 160 - 466 K/uL    MPV 10.6 9.4 - 12.3 fL    Neutrophils % 56.4 50.0 - 65.0 %    Lymphocytes % 31.6 20.0 - 40.0 %    Monocytes % 8.1 0.0 - 10.0 %    Eosinophils % 2.4 0.0 - 5.0 %    Basophils % 1.1 (H) 0.0 - 1.0 %    Neutrophils Absolute 4.5 1.5 - 7.5 K/uL    Immature Granulocytes # 0.0 K/uL    Lymphocytes Absolute 2.5 1.1 - 4.5 K/uL    Monocytes Absolute 0.70 0.00 - 0.90 K/uL    Eosinophils Absolute 0.20 0.00 - 0.60 K/uL    Basophils Absolute 0.10 0.00 - 0.20 K/uL   Protime-INR    Collection Time: 05/09/22  3:38 AM   Result Value Ref Range    Protime 12.5 12.0 - 14.6 sec    INR 0.94 0.88 - 1.18   APTT    Collection Time: 05/09/22  3:38 AM   Result Value Ref Range    aPTT 30.3 26.0 - 36.2 sec   Sedimentation Rate    Collection Time: 05/09/22  3:38 AM   Result Value Ref Range    Sed Rate 13 0 - 25 mm/Hr   Vitamin B12    Collection Time: 05/09/22  3:38 AM   Result Value Ref Range    Vitamin B-12 446 211 - 946 pg/mL   Folate    Collection Time: 05/09/22  3:38 AM   Result Value Ref Range    Folate 13.8 4.8 - 37.3 ng/mL   POCT Glucose    Collection Time: 05/09/22  4:10 AM   Result Value Ref Range    POC Glucose 123 (H) 70 - 99 mg/dl    Performed on [employer]      Narrative   EXAM: MRI BRAIN WO CONTRAST   INDICATION: Right-sided numbness   COMPARISON: Same day CT head   FINDINGS:   No abnormal diffusion restriction. No increased susceptibility to   suggest hemorrhage. Major physiologic flow voids are maintained. No abnormality of brain parenchyma signal. No midline shift or mass   effect. The lateral ventricles are nondilated. Partially empty sella   turcica. Orbits are unremarkable. Paranasal sinuses are clear. Mastoid air   cells are clear.       Impression   No acute intracranial findings. Signed by Dr Vicki Ramírez:  ADULT DIET; Regular; 5 carb choices (75 gm/meal); Low Fat/Low Chol/High Fiber/KALPANA; Vegan, No Caffeine    Examination:  Vitals: BP (!) 130/93   Pulse 68   Temp 97.5 °F (36.4 °C) (Temporal)   Resp 16   Ht 5' 5\" (1.651 m)   Wt 150 lb (68 kg)   SpO2 97%   BMI 24.96 kg/m²      Intake/Output Summary (Last 24 hours) at 5/9/2022 7862  Last data filed at 5/9/2022 0858  Gross per 24 hour   Intake 120 ml   Output --   Net 120 ml     General appearance: alert, appears stated age and cooperative  HEENT: Head: Normal, normocephalic, atraumatic. Neck:no adenopathy, no carotid bruit, no JVD, supple, symmetrical, trachea midline and thyroid not enlarged, symmetric, no tenderness/mass/nodules  Lungs: clear to auscultation bilaterally  Heart: regular rate and rhythm, S1, S2 normal, no murmur, click, rub or gallop  Extremities: extremities normal, atraumatic, no cyanosis or edema  Neurologic:  Alert, oriented. No dysarthria. Speech is fluent. EOMI, PERRL, VFF. No facial weakness. Limb strength is normal.  No pronator drift.   Rapid alternating movements are normal.  Finger to nose testing shows no dysmetria. Assessment:   1. Recurrent sensory TIAs    2. Hypertension  3. Elevated liver enzymes, recently evaluated. Plan:   1. Echocardiogram  2. ASA 81 mg daily. May have to add Plavix for now  3. Hypercoag labs pending  4. PT     Discharge planning:   Home    Reviewed treatment plans with the patient and/or family. 35 minutes spent in face to face interaction and coordination of care.      Electronically signed by Suresh Starks MD on 5/9/2022 at 9:27 AM

## 2022-05-09 NOTE — DISCHARGE SUMMARY
Physician Discharge Summary     Patient ID:  Tish Thompson  [id number]  41 y.o.  1970    Admit date: 5/8/2022    Discharge date and time: 5/9/2022     Discharge Physician: Zainab Rivera MD     Discharge Diagnoses:     Recurrent TIAs- ASA, plavix, statin - follow up with neurology as OP in one week  HL- add statin  HTN      Discharged Condition: good    Indication for Admission: TIA    Hospital Course:     51 yo female with RENEE, HTN, presented with right-sided numbness. She states that she began to have intermittent right-sided numbness from her head to her toe approximately 5 days ago. Further ED work-up revealed chemistries within normal limits. CBC within normal limits. CTA showed no intracranial large vessel occlusion or significant stenosis. No intracranial aneurysm. No significant steno-occlusive disease involving the carotid or vertebral arteries. She was found to have fetal origin of the left posterior cerebral artery. CTA of the head showed no acute intracranial process. She had another episode of right sided numbness and tingling in the hospital.  She tried to get up with assistance while this was going on and had difficulty. She was started on ASA and Plavix per neurology. Echo normal. Cleared for dc with OP follow up with neurology in one week. Consults: neurology    Significant Diagnostic Studies:     XR CHEST PORTABLE [[id number]] Resulted: 05/09/22 0650      Order Status: Completed Updated: 05/09/22 2592     Narrative:       Examination. XR CHEST PORTABLE 5/9/2022 4:46 AM   History: Strokelike symptoms. A frontal portable upright view of the chest is compared with the   previous study dated 7/12/2021. The lungs are moderately expanded. A small ill-defined noncalcified nodule in the right parahilar region   are seen in the previous study and probably represent a granuloma. There is no pleural effusion, pulmonary congestion or pneumothorax.    Heart size in the normal range.   No acute bony abnormality.     Impression:       1. No active cardiopulmonary disease. Signed by Dr Tigre Crowell [[id number]] Resulted: 05/09/22 0627     Order Status: Completed Updated: 05/09/22 0629     Narrative:       Examination. CT HEAD WO CONTRAST 5/9/2022 4:16 AM   History: Stroke. DLP: 786 mGycm. The automated exposure control was utilized to   minimize the patient's radiation exposure. The CT scan of the head is performed without intravenous contrast   enhancement. The images are acquired in axial plane and subsequent   reconstruction in coronal and sagittal planes. The comparison is made with the previous study dated 5/8/2022. There is no evidence of a mass. No midline shift. There is no evidence of an intracranial hemorrhage or hematoma. Ventricles, the basal cisterns and the cortical sulci are normal.   There is normal gray-white matter differentiation. An empty sella turcica are seen. The images reviewed in bone window show no acute bony abnormality. Visualized paranasal sinuses and mastoid air cells are clear.     Impression:       1. A negative unenhanced CT scan of the head. The above study was initially reviewed and reported by [facility]. I do   not find any discrepancies. Signed by Dr Irene Perez [[id number]] Resulted: 05/08/22 1726     Order Status: Completed Updated: 05/08/22 1728     Narrative:       EXAM: MRI BRAIN WO CONTRAST   INDICATION: Right-sided numbness   COMPARISON: Same day CT head   FINDINGS:   No abnormal diffusion restriction. No increased susceptibility to   suggest hemorrhage. Major physiologic flow voids are maintained. No abnormality of brain parenchyma signal. No midline shift or mass   effect. The lateral ventricles are nondilated. Partially empty sella   turcica. Orbits are unremarkable. Paranasal sinuses are clear.  Mastoid air   cells are clear.     Impression:       No acute intracranial findings. Signed by Dr Tiana Johnson brain with and without contrast [[id number]]      Order Status: Canceled      CTA HEAD NECK W CONTRAST [[id number]] Resulted: 05/08/22 1436     Order Status: Completed Updated: 05/08/22 1438     Narrative:       EXAMINATION: CTA HEAD NECK W CONTRAST 5/8/2022 3:30 PM   HISTORY: Intermittent right-sided facial, upper and lower 70   paresthesias for 5 days, increasing frequency. DOSE: 640 mGycm. Automatic exposure control was utilized in an effort   to use as little radiation as possible, without compromising image   quality. REPORT: Spiral CT of the head and neck was performed after   administration of intravenous contrast, using CTA protocol, which   includes reconstructed coronal, sagittal and 3-D images. COMPARISON: Noncontrast CT of the head 5/8/2022. NASCET criteria is utilized for determination of carotid stenoses. Review of lung windows demonstrates normal aeration of the visualized   upper lobes, there is symmetric subpleural opacity at the apices, most   compatible with chronic fibrosis. Caliber of the aortic arch is   normal. No aortic dissection is seen involving the visualized thoracic   aorta. The right common carotid artery is patent, normal caliber,   without evidence of dissection. There is no significant plaque   deposition at the right carotid bifurcation, with 0% stenosis. The   right internal and external carotid arteries are patent. The left   common carotid artery is patent, normal caliber, without evidence of   dissection. There is no significant plaque deposition at the left   carotid bifurcation, with 0% stenosis. Bilateral vertebral arteries   are patent, neither vertebral artery appears dominant, no evidence of   vertebral dissection is identified. Intracranially, bilateral PICA are   patent, the basilar artery is patent and normal in caliber.  Distal   branch vessels of the basilar artery are patent, there is fetal origin of the left posterior cerebral artery, a congenital variant. The right   posterior communicating artery is patent. In the anterior circulation,   the middle and anterior cerebral arteries are patent and appear normal   in caliber, there is no large vessel occlusion or high-grade stenosis. No intracranial aneurysm is identified. Review of soft tissue windows   demonstrates no neck mass or evidence of lymphadenopathy. The airway   is patent. There is normal aeration of the paranasal sinuses and   mastoid air cells. Review of bone windows shows no acute osseous   abnormality.     Impression:       1. No intracranial arterial large vessel occlusion or significant   stenosis is identified. No intracranial aneurysm. There is fetal   origin of the left posterior cerebral artery, a normal variant. 2. No significant steno-occlusive disease involving the carotid or   vertebral arteries. No evidence of carotid or vertebral dissection. Signed by Dr Gurvinder Davies. John Paul     CT Head WO Contrast [[id number]] Resulted: 05/08/22 1359     Order Status: Completed Updated: 05/08/22 1401     Narrative:       EXAMINATION: CT HEAD WO CONTRAST 5/8/2022 2:56 PM   HISTORY: Right-sided numbness for 5 days intermittently. Stroke   symptoms. DOSE: 839 mGycm. Automatic exposure control was utilized in an effort   to use as little radiation as possible, without compromising image   quality. REPORT:   Spiral CT of the head was performed without intravenous contrast.   Reconstructed coronal and sagittal images were also obtained. COMPARISON: CT head without contrast 10/24/2018. No evidence of intracranial hemorrhage, mass or mass-effect is   identified. The ventricles and basal cisterns appear within normal   limits. There is an \"empty\" sella as before. This can be seen as a   normal variant. Bone windows are unremarkable.     Impression:       Impression: No acute intracranial abnormality.       Signed by Dr Gurvinder Coker     Discharge Exam:  /86   Pulse 77   Temp 97.7 °F (36.5 °C) (Temporal)   Resp 16   Ht 5' 5\" (1.651 m)   Wt 150 lb (68 kg)   SpO2 94%   BMI 24.96 kg/m²     General Appearance:    Alert, cooperative, no distress, appears stated age   Head:    Normocephalic, without obvious abnormality, atraumatic   Eyes:    PERRL, conjunctiva/corneas clear, EOM's intact, fundi     benign, both eyes       Nose:   Nares normal, septum midline, mucosa normal, no drainage    or sinus tenderness       Neck:   Supple, symmetrical, trachea midline, no adenopathy;     thyroid:  no enlargement/tenderness/nodules; no carotid    bruit or JVD       Lungs:     Clear to auscultation bilaterally, respirations unlabored        Heart:    Regular rate and rhythm, S1 and S2 normal, no murmur, rub   or gallop       Abdomen:     Soft, non-tender, bowel sounds active all four quadrants,     no masses, no organomegaly           Extremities:   Extremities normal, atraumatic, no cyanosis or edema       Skin:   Skin color, texture, turgor normal, no rashes or lesions       Neurologic:   CNII-XII intact, normal strength, sensation and reflexes     throughout       Discharge Medications:         Medication List      START taking these medications    aspirin 81 MG EC tablet  Take 1 tablet by mouth daily  Start taking on:  May 10, 2022     clopidogrel 75 MG tablet  Commonly known as: Plavix  Take 1 tablet by mouth daily     pantoprazole 20 MG tablet  Commonly known as: Protonix  Take 1 tablet by mouth daily     rosuvastatin 40 MG tablet  Commonly known as: CRESTOR  Take 1 tablet by mouth nightly        CHANGE how you take these medications    fluticasone 50 MCG/ACT nasal spray  Commonly known as: FLONASE  1 spray by Nasal route daily  What changed:   · when to take this  · reasons to take this        CONTINUE taking these medications    amitriptyline 25 MG tablet  Commonly known as: ELAVIL  Take 2 tablets by mouth nightly     amLODIPine 2.5 MG tablet  Commonly known as: NORVASC  Take 1 tablet by mouth nightly     busPIRone 5 MG tablet  Commonly known as: BUSPAR  TAKE 1 TABLET BY MOUTH THREE TIMES DAILY AS NEEDED FOR ANXIETY (NO FURTHER REFILLS UNTIL SEEN IN OFFICE)     desvenlafaxine succinate 50 MG Tb24 extended release tablet  Commonly known as: PRISTIQ  TAKE 1 TABLET BY MOUTH ONCE DAILY     hydroCHLOROthiazide 25 MG tablet  Commonly known as: HYDRODIURIL  Take 1 tablet by mouth daily     vitamin C 500 MG tablet  Commonly known as: ASCORBIC ACID     vitamin E 1000 units capsule     zinc 50 MG Caps           Where to Get Your Medications      These medications were sent to [address] [place] [address] [address] [place] [address]    Phone: [phone]   · aspirin 81 MG EC tablet  · clopidogrel 75 MG tablet  · pantoprazole 20 MG tablet  · rosuvastatin 40 MG tablet           Discharge Instructions: Follow up with JUAN JOSE Tee in 2 days. With Dr Vitaly Ortega in one week. Take medications as directed. Resume activity as tolerated. Diet: ADULT DIET; Regular; 5 carb choices (75 gm/meal);  Low Fat/Low Chol/High Fiber/KALPANA; Vegan, No Caffeine     Disposition: Patient is medically stable and will be discharged home    DC obs

## 2022-05-10 ENCOUNTER — TELEPHONE (OUTPATIENT)
Dept: PRIMARY CARE CLINIC | Age: 52
End: 2022-05-10

## 2022-05-10 NOTE — TELEPHONE ENCOUNTER
[facility] Trans[facility] Initial Follow Up Call    Outreach made within 2 business days of discharge: Yes    Patient: Kym Gonzales   Patient : 1970   MRN: [id number]    Reason for Admission: Recurrent TIAs  Discharge Date: 22       Spoke with: Patient    Discharge department/facility: [facility]    TCM Interactive Patient Contact:  Was patient able to fill all prescriptions: Yes  Was patient instructed to bring all medications to the follow-up visit: Yes  Is patient taking all medications as directed in the discharge summary? Yes  Does patient understand their discharge instructions: Yes  Does patient have questions or concerns that need addressed prior to 7-14 day follow up office visit: no    Spoke with patient. She states she is feeling well and her symptoms have subsided. She will see Rudy Landers on Wed.  At 3:00    Scheduled appointment with PCP within 7-14 days    Follow Up  Future Appointments   Date Time Provider Garfield Ling   2022  9:45  Juve Mota [address] [place] [address], [address] [place] [address]

## 2022-05-10 NOTE — PROGRESS NOTES
Patient is stable to discharge home, she is to follow up with neurology in 1-2 weeks, discharge instructions gone over with patient and verbalizes understanding, iv removed from left hand no complications. Patient is discharging home in stable condition in care of self.

## 2022-05-11 ENCOUNTER — OFFICE VISIT (OUTPATIENT)
Dept: PRIMARY CARE CLINIC | Age: 52
End: 2022-05-11
Payer: COMMERCIAL

## 2022-05-11 VITALS
WEIGHT: 153.2 LBS | BODY MASS INDEX: 25.52 KG/M2 | DIASTOLIC BLOOD PRESSURE: 98 MMHG | OXYGEN SATURATION: 99 % | TEMPERATURE: 99 F | SYSTOLIC BLOOD PRESSURE: 138 MMHG | HEIGHT: 65 IN | HEART RATE: 81 BPM

## 2022-05-11 DIAGNOSIS — I10 HYPERTENSION, ESSENTIAL, BENIGN: ICD-10-CM

## 2022-05-11 DIAGNOSIS — M54.40 CHRONIC BILATERAL LOW BACK PAIN WITH SCIATICA, SCIATICA LATERALITY UNSPECIFIED: ICD-10-CM

## 2022-05-11 DIAGNOSIS — Z09 HOSPITAL DISCHARGE FOLLOW-UP: Primary | ICD-10-CM

## 2022-05-11 DIAGNOSIS — G89.29 OTHER CHRONIC PAIN: ICD-10-CM

## 2022-05-11 DIAGNOSIS — G89.29 CHRONIC BILATERAL LOW BACK PAIN WITH SCIATICA, SCIATICA LATERALITY UNSPECIFIED: ICD-10-CM

## 2022-05-11 DIAGNOSIS — G45.9 TIA (TRANSIENT ISCHEMIC ATTACK): ICD-10-CM

## 2022-05-11 LAB — ZINC: 84.6 UG/DL (ref 60–120)

## 2022-05-11 PROCEDURE — 1111F DSCHRG MED/CURRENT MED MERGE: CPT | Performed by: NURSE PRACTITIONER

## 2022-05-11 PROCEDURE — 99495 TRANSJ CARE MGMT MOD F2F 14D: CPT | Performed by: NURSE PRACTITIONER

## 2022-05-11 RX ORDER — HYDROCHLOROTHIAZIDE 25 MG/1
12.5 TABLET ORAL DAILY
Qty: 30 TABLET | Refills: 5 | Status: SHIPPED | OUTPATIENT
Start: 2022-05-11 | End: 2022-07-28 | Stop reason: SDUPTHER

## 2022-05-11 RX ORDER — AMLODIPINE BESYLATE 5 MG/1
5 TABLET ORAL NIGHTLY
Qty: 30 TABLET | Refills: 2 | Status: SHIPPED | OUTPATIENT
Start: 2022-05-11 | End: 2022-07-28 | Stop reason: SDUPTHER

## 2022-05-11 RX ORDER — CLOPIDOGREL BISULFATE 75 MG/1
75 TABLET ORAL EVERY EVENING
Qty: 30 TABLET | Refills: 5 | Status: SHIPPED | OUTPATIENT
Start: 2022-05-11 | End: 2022-07-28 | Stop reason: SDUPTHER

## 2022-05-11 RX ORDER — AMITRIPTYLINE HYDROCHLORIDE 25 MG/1
50 TABLET, FILM COATED ORAL NIGHTLY
Qty: 60 TABLET | Refills: 2 | Status: SHIPPED | OUTPATIENT
Start: 2022-05-11 | End: 2022-07-28 | Stop reason: SDUPTHER

## 2022-05-11 RX ORDER — ASPIRIN 81 MG/1
81 TABLET ORAL EVERY EVENING
Qty: 30 TABLET | Refills: 5 | Status: SHIPPED | OUTPATIENT
Start: 2022-05-11 | End: 2022-07-28 | Stop reason: SDUPTHER

## 2022-05-11 NOTE — TELEPHONE ENCOUNTER
Valentina Palomares called to request a refill on her medication.       Last office visit : 5/11/2022   Next office visit : 6/2/2022     Requested Prescriptions     Pending Prescriptions Disp Refills    amitriptyline (ELAVIL) 25 MG tablet 60 tablet 2     Sig: Take 2 tablets by mouth nightly            Danelle Ramirez

## 2022-05-11 NOTE — PROGRESS NOTES
Post-Discharge Transitional Care  Follow Up      Tish Thompson   YOB: 1970    Date of Office Visit:  5/11/2022  Date of Hospital Admission: 5/8/22  Date of Hospital Discharge: 5/9/22  Risk of hospital readmission (high >=14%. Medium >=10%) :No data recorded    Care management risk score Rising risk (score 2-5) and Complex Care (Scores >=6): 6     Non face to face  following discharge, date last encounter closed (first attempt may have been earlier): 5/10/2022 11:17 AM    Call initiated 2 business days of discharge: Yes    ASSESSMENT/PLAN:   Hospital discharge follow-up  -     [place] DISCHARGE MEDS RECONCILED W/ CURRENT OUTPATIENT MED LIST  Hypertension, essential, benign  -     hydroCHLOROthiazide (HYDRODIURIL) 25 MG tablet; Take 0.5 tablets by mouth daily, Disp-30 tablet, R-5Normal  -     amLODIPine (NORVASC) 5 MG tablet; Take 1 tablet by mouth nightly, Disp-30 tablet, R-2Normal  TIA (transient ischemic attack)  -     aspirin 81 MG EC tablet; Take 1 tablet by mouth every evening, Disp-30 tablet, R-5Normal  -     clopidogrel (PLAVIX) 75 MG tablet; Take 1 tablet by mouth every evening, Disp-30 tablet, R-5Normal      Medical Decision Making: moderate complexity  Return in 1 month (on 6/11/2022). On this date 5/11/2022 I have spent 30 minutes reviewing previous notes, test results and face to face with the patient discussing the diagnosis and importance of compliance with the treatment plan as well as documenting on the day of the visit. Subjective:   HPI:  Follow up of Hospital problems/diagnosis(es): TIA, HTN, RENEE    Inpatient course: Discharge summary reviewed- see chart.                                   Interval history/Current status: stable    Patient Active Problem List   Diagnosis    Irritable bowel syndrome with diarrhea    Transaminitis    Hepatic steatosis    History of liver biopsy    Chronic bronchitis (HCC)    Tremor    Moderate persistent asthma without complication    Vitamin D deficiency    Essential tremor    Chronic migraine without aura, intractable, with status migrainosus    Chronic migraine without aura, intractable, without status migrainosus    Fatigue    Chest pain, exertional    COPD (chronic obstructive pulmonary disease) with chronic bronchitis (HCC)    Prediabetes    Elevated blood pressure, situational    Fibromyalgia    Other chronic pain    Chronic back pain    Insomnia    Vaginal atrophy    Hormone deficiency    Depression    Anxiety    Sciatica of right side    Hypertension, essential, benign    Breast cancer screening by mammogram    Chronic bilateral low back pain with sciatica    Allergic rhinitis    Transient ischemic attack (TIA)       Medications listed as ordered at the time of discharge from hospital     Medication List          Accurate as of May 11, 2022  3:39 PM. If you have any questions, ask your nurse or doctor.             CHANGE how you take these medications    amLODIPine 5 MG tablet  Commonly known as: NORVASC  Take 1 tablet by mouth nightly  What changed:   · medication strength  · how much to take  Changed by: JUAN JOSE Nolasco     aspirin 81 MG EC tablet  Take 1 tablet by mouth every evening  What changed: when to take this  Changed by: JUAN JOSE Nolasco     clopidogrel 75 MG tablet  Commonly known as: Plavix  Take 1 tablet by mouth every evening  What changed: when to take this  Changed by: JUAN JOSE Nolasco     fluticasone 50 MCG/ACT nasal spray  Commonly known as: FLONASE  1 spray by Nasal route daily  What changed:   · when to take this  · reasons to take this     hydroCHLOROthiazide 25 MG tablet  Commonly known as: HYDRODIURIL  Take 0.5 tablets by mouth daily  What changed: how much to take  Changed by: JUAN JOSE Nolasco        CONTINUE taking these medications    amitriptyline 25 MG tablet  Commonly known as: ELAVIL  Take 2 tablets by mouth nightly     busPIRone 5 MG tablet  Commonly known as: BUSPAR  TAKE 1 TABLET BY MOUTH THREE TIMES DAILY AS NEEDED FOR ANXIETY (NO FURTHER REFILLS UNTIL SEEN IN OFFICE)     desvenlafaxine succinate 50 MG Tb24 extended release tablet  Commonly known as: PRISTIQ  TAKE 1 TABLET BY MOUTH ONCE DAILY     pantoprazole 20 MG tablet  Commonly known as: Protonix  Take 1 tablet by mouth daily     rosuvastatin 40 MG tablet  Commonly known as: CRESTOR  Take 1 tablet by mouth nightly     vitamin C 500 MG tablet  Commonly known as: ASCORBIC ACID     vitamin E 1000 units capsule     zinc 50 MG Caps           Where to Get Your Medications      Information about where to get these medications is not yet available    Ask your nurse or doctor about these medications  · amLODIPine 5 MG tablet  · aspirin 81 MG EC tablet  · clopidogrel 75 MG tablet  · hydroCHLOROthiazide 25 MG tablet           Medications marked \"taking\" at this time  Outpatient Medications Marked as Taking for the 5/11/22 encounter (Office Visit) with Gisselle PatientJUAN JOSE   Medication Sig Dispense Refill    hydroCHLOROthiazide (HYDRODIURIL) 25 MG tablet Take 0.5 tablets by mouth daily 30 tablet 5    amLODIPine (NORVASC) 5 MG tablet Take 1 tablet by mouth nightly 30 tablet 2    aspirin 81 MG EC tablet Take 1 tablet by mouth every evening 30 tablet 5    clopidogrel (PLAVIX) 75 MG tablet Take 1 tablet by mouth every evening 30 tablet 5    rosuvastatin (CRESTOR) 40 MG tablet Take 1 tablet by mouth nightly 30 tablet 3    pantoprazole (PROTONIX) 20 MG tablet Take 1 tablet by mouth daily 30 tablet 3    vitamin C (ASCORBIC ACID) 500 MG tablet Take 500 mg by mouth daily      zinc 50 MG CAPS Take 50 mg by mouth daily      amitriptyline (ELAVIL) 25 MG tablet Take 2 tablets by mouth nightly 60 tablet 2    busPIRone (BUSPAR) 5 MG tablet TAKE 1 TABLET BY MOUTH THREE TIMES DAILY AS NEEDED FOR ANXIETY (NO FURTHER REFILLS UNTIL SEEN IN OFFICE) 90 tablet 0    desvenlafaxine succinate (PRISTIQ) 50 MG TB24 extended release tablet TAKE 1 TABLET BY MOUTH ONCE DAILY 30 tablet 2    fluticasone (FLONASE) 50 MCG/ACT nasal spray 1 spray by Nasal route daily (Patient taking differently: 1 spray by Nasal route 2 times daily as needed ) 1 each 5    vitamin E 1000 units capsule Take 1,000 Units by mouth daily          Medications patient taking as of now reconciled against medications ordered at time of hospital discharge: Yes    A comprehensive review of systems was negative except for what was noted in the HPI. Objective:    BP (!) 138/98   Pulse 81   Temp 99 °F (37.2 °C) (Temporal)   Ht 5' 5\" (1.651 m)   Wt 153 lb 3.2 oz (69.5 kg)   SpO2 99%   BMI 25.49 kg/m²   General Appearance: alert and oriented to person, place and time, well developed and well- nourished, in no acute distress  Skin: warm and dry, no rash or erythema  Head: normocephalic and atraumatic  Eyes: pupils equal, round, and reactive to light, extraocular eye movements intact, conjunctivae normal  ENT: tympanic membrane, external ear and ear canal normal bilaterally, nose without deformity, nasal mucosa and turbinates normal without polyps  Neck: supple and non-tender without mass, no thyromegaly or thyroid nodules, no cervical lymphadenopathy  Pulmonary/Chest: clear to auscultation bilaterally- no wheezes, rales or rhonchi, normal air movement, no respiratory distress  Cardiovascular: normal rate, regular rhythm, normal S1 and S2, no murmurs, rubs, clicks, or gallops, distal pulses intact, no carotid bruits  Abdomen: soft, non-tender, non-distended, normal bowel sounds, no masses or organomegaly  Extremities: no cyanosis, clubbing or edema  Musculoskeletal: normal range of motion, no joint swelling, deformity or tenderness  Neurologic: reflexes normal and symmetric, no cranial nerve deficit, gait, coordination and speech normal      An electronic signature was used to authenticate this note.   --JUAN JOSE Mojica, was evaluated through a synchronous (real-time) audio-video encounter. The patient (or guardian if applicable) is aware that this is a billable service, which includes applicable co-pays. This Virtual Visit was conducted with patient's (and/or legal guardian's) consent. The visit was conducted pursuant to the emergency declaration under the [address] [place] [address] authority and the [employer] and [employer] Act. Patient identification was verified, and a caregiver was present when appropriate. The patient was located at home in a state where the provider was licensed to provide care. An electronic signature was used to authenticate this note.   --JUAN JOSE Alvarado

## 2022-05-15 LAB
ACTIVATED PROTEIN C RESISTANCE: 3.4
ANTICARDIOLIPIN IGG ANTIBODY: <10 GPL
ANTITHROMBIN ACTIVITY: 100 % (ref 76–128)
BETA-2 GLYCOPROTEIN 1 IGG ANTIBODY: <10 SGU
BETA-2 GLYCOPROTEIN 1 IGM ANTIBODY: <10 SMU
CARDIOLIPIN AB IGM: <10 MPL
DRVVT CONFIRMATION TEST: ABNORMAL RATIO
DRVVT SCREEN: 25 SEC (ref 33–44)
DRVVT,DIL: ABNORMAL SEC (ref 33–44)
FACTOR V LEIDEN: ABNORMAL
FACV SPECIMEN: ABNORMAL
HEXAGONAL PHOSPHOLIPID NEUTRALIZAT TEST: ABNORMAL
HOMOCYSTEINE: 6 UMOL/L (ref 0–15)
LUPUS ANTICOAG INTERP: ABNORMAL
PLT NEUTA: ABNORMAL
PROTEIN C FUNCTIONAL: 152 % (ref 83–168)
PROTEIN S ANTIGEN, FREE: 101 % (ref 55–123)
PROTHROMBIN G20210A MUTATION: NEGATIVE
PT D: 12.7 SEC (ref 12–15.5)
PT PCR SPECIMEN: ABNORMAL
PTT D: 45 SEC (ref 32–48)
PTT-D CORR REFLEX: ABNORMAL SEC (ref 32–48)
PTT-HEPARIN NEUTRALIZED: ABNORMAL SEC (ref 32–48)
REPTILASE TIME: ABNORMAL SEC
THROMBIN TIME: ABNORMAL SEC (ref 14.7–19.5)
THROMBOSIS INTERPRETATION: ABNORMAL

## 2022-05-17 ENCOUNTER — APPOINTMENT (OUTPATIENT)
Dept: GENERAL RADIOLOGY | Age: 52
End: 2022-05-17
Payer: COMMERCIAL

## 2022-05-17 ENCOUNTER — APPOINTMENT (OUTPATIENT)
Dept: CT IMAGING | Age: 52
End: 2022-05-17
Payer: COMMERCIAL

## 2022-05-17 ENCOUNTER — HOSPITAL ENCOUNTER (EMERGENCY)
Age: 52
Discharge: HOME | End: 2022-05-17
Attending: EMERGENCY MEDICINE
Payer: COMMERCIAL

## 2022-05-17 VITALS
RESPIRATION RATE: 13 BRPM | BODY MASS INDEX: 25.46 KG/M2 | WEIGHT: 153 LBS | OXYGEN SATURATION: 97 % | SYSTOLIC BLOOD PRESSURE: 127 MMHG | DIASTOLIC BLOOD PRESSURE: 83 MMHG | TEMPERATURE: 98 F | HEART RATE: 78 BPM

## 2022-05-17 DIAGNOSIS — G45.9 TIA (TRANSIENT ISCHEMIC ATTACK): Primary | ICD-10-CM

## 2022-05-17 DIAGNOSIS — I10 ESSENTIAL HYPERTENSION: ICD-10-CM

## 2022-05-17 LAB
ALBUMIN SERPL-MCNC: 3.3 G/DL (ref 3.5–5.2)
ALP BLD-CCNC: 53 U/L (ref 35–104)
ALT SERPL-CCNC: 30 U/L (ref 5–33)
ANION GAP SERPL CALCULATED.3IONS-SCNC: 8 MMOL/L (ref 7–19)
APTT: 24.5 SEC (ref 26–36.2)
AST SERPL-CCNC: 21 U/L (ref 5–32)
BASOPHILS ABSOLUTE: 0.1 K/UL (ref 0–0.2)
BASOPHILS RELATIVE PERCENT: 0.9 % (ref 0–1)
BILIRUB SERPL-MCNC: <0.2 MG/DL (ref 0.2–1.2)
BUN BLDV-MCNC: 9 MG/DL (ref 6–20)
CALCIUM SERPL-MCNC: 8.1 MG/DL (ref 8.6–10)
CHLORIDE BLD-SCNC: 101 MMOL/L (ref 98–111)
CHP ED QC CHECK: YES
CO2: 21 MMOL/L (ref 22–29)
CREAT SERPL-MCNC: 0.6 MG/DL (ref 0.5–0.9)
EKG P AXIS: 15 DEGREES
EKG P-R INTERVAL: 172 MS
EKG Q-T INTERVAL: 404 MS
EKG QRS DURATION: 84 MS
EKG QTC CALCULATION (BAZETT): 430 MS
EKG T AXIS: 34 DEGREES
EOSINOPHILS ABSOLUTE: 0.2 K/UL (ref 0–0.6)
EOSINOPHILS RELATIVE PERCENT: 2.2 % (ref 0–5)
GFR AFRICAN AMERICAN: >59
GFR NON-AFRICAN AMERICAN: >60
GLUCOSE BLD-MCNC: 177 MG/DL (ref 74–109)
GLUCOSE BLD-MCNC: 193 MG/DL
HCT VFR BLD CALC: 44.7 % (ref 37–47)
HEMOGLOBIN: 14.3 G/DL (ref 12–16)
IMMATURE GRANULOCYTES #: 0 K/UL
INR BLD: 1.02 (ref 0.88–1.18)
LYMPHOCYTES ABSOLUTE: 1.7 K/UL (ref 1.1–4.5)
LYMPHOCYTES RELATIVE PERCENT: 22.5 % (ref 20–40)
MCH RBC QN AUTO: 30.2 PG (ref 27–31)
MCHC RBC AUTO-ENTMCNC: 32 G/DL (ref 33–37)
MCV RBC AUTO: 94.3 FL (ref 81–99)
MONOCYTES ABSOLUTE: 0.7 K/UL (ref 0–0.9)
MONOCYTES RELATIVE PERCENT: 8.7 % (ref 0–10)
NEUTROPHILS ABSOLUTE: 5 K/UL (ref 1.5–7.5)
NEUTROPHILS RELATIVE PERCENT: 65.4 % (ref 50–65)
PDW BLD-RTO: 11.7 % (ref 11.5–14.5)
PLATELET # BLD: 280 K/UL (ref 130–400)
PMV BLD AUTO: 10.9 FL (ref 9.4–12.3)
POTASSIUM REFLEX MAGNESIUM: 3.9 MMOL/L (ref 3.5–5)
PROTHROMBIN TIME: 13.3 SEC (ref 12–14.6)
RBC # BLD: 4.74 M/UL (ref 4.2–5.4)
SODIUM BLD-SCNC: 130 MMOL/L (ref 136–145)
TOTAL PROTEIN: 5.7 G/DL (ref 6.6–8.7)
TROPONIN: <0.01 NG/ML (ref 0–0.03)
WBC # BLD: 7.6 K/UL (ref 4.8–10.8)

## 2022-05-17 PROCEDURE — 36415 COLL VENOUS BLD VENIPUNCTURE: CPT

## 2022-05-17 PROCEDURE — 93010 ELECTROCARDIOGRAM REPORT: CPT | Performed by: INTERNAL MEDICINE

## 2022-05-17 PROCEDURE — 6360000004 HC RX CONTRAST MEDICATION: Performed by: EMERGENCY MEDICINE

## 2022-05-17 PROCEDURE — 99285 EMERGENCY DEPT VISIT HI MDM: CPT

## 2022-05-17 PROCEDURE — 84484 ASSAY OF TROPONIN QUANT: CPT

## 2022-05-17 PROCEDURE — 71045 X-RAY EXAM CHEST 1 VIEW: CPT

## 2022-05-17 PROCEDURE — 85730 THROMBOPLASTIN TIME PARTIAL: CPT

## 2022-05-17 PROCEDURE — 85610 PROTHROMBIN TIME: CPT

## 2022-05-17 PROCEDURE — 2580000003 HC RX 258: Performed by: EMERGENCY MEDICINE

## 2022-05-17 PROCEDURE — 70498 CT ANGIOGRAPHY NECK: CPT

## 2022-05-17 PROCEDURE — 70450 CT HEAD/BRAIN W/O DYE: CPT

## 2022-05-17 PROCEDURE — 85025 COMPLETE CBC W/AUTO DIFF WBC: CPT

## 2022-05-17 PROCEDURE — 93005 ELECTROCARDIOGRAM TRACING: CPT | Performed by: EMERGENCY MEDICINE

## 2022-05-17 PROCEDURE — 80053 COMPREHEN METABOLIC PANEL: CPT

## 2022-05-17 RX ORDER — 0.9 % SODIUM CHLORIDE 0.9 %
500 INTRAVENOUS SOLUTION INTRAVENOUS ONCE
Status: COMPLETED | OUTPATIENT
Start: 2022-05-17 | End: 2022-05-17

## 2022-05-17 RX ADMIN — IOPAMIDOL 90 ML: 755 INJECTION, SOLUTION INTRAVENOUS at 09:55

## 2022-05-17 RX ADMIN — SODIUM CHLORIDE 500 ML: 9 INJECTION, SOLUTION INTRAVENOUS at 09:58

## 2022-05-17 ASSESSMENT — ENCOUNTER SYMPTOMS
COUGH: 0
SHORTNESS OF BREATH: 0
ABDOMINAL PAIN: 0

## 2022-05-17 ASSESSMENT — PAIN - FUNCTIONAL ASSESSMENT: PAIN_FUNCTIONAL_ASSESSMENT: NONE - DENIES PAIN

## 2022-05-17 NOTE — ED TRIAGE NOTES
Pt here with numbness and tingling started about 0850 to the right side.  Pt states that she was dx with a tia last week

## 2022-05-17 NOTE — Clinical Note
Armando Moses was seen and treated in our emergency department on 5/17/2022. She may return to work on 05/18/2022. If you have any questions or concerns, please don't hesitate to call.       Leeann Grijalva MD

## 2022-05-17 NOTE — ED NOTES
Dr Violet Arce called a Code Stroke @ [phone]    Paged Dr Zamora Loge with Neurology @ [phone]    Paged the Stroke Coordinator @ [phone] @ [phone] Check patient to radiology            [facility]  05/17/22 0924       [facility]  05/17/22 0925       [facility]  05/17/22 1098

## 2022-05-17 NOTE — ED NOTES
Accompanied Dr Jacob Velazco to bedside to speak with patient. Dr Jacob Velazco offered tPA to the patient. He explained the risks and benefits of receiving the medication. He explained to the patient that there were no large vessel occlusions. Patient stated that her symptoms were resolving with only mild numbness. Patient stated that she did not want to receive tPA at this time. Patient was advised by Dr Jacob Velazco and myself that if symptoms worsen to immediately let anyone in our staff know so that we could reassess her symptoms and decision not to receive tPA. Patient agreed to all. Patient was offered admission or a follow up with Neurology as soon as we could get her an appointment. Patient will consider both and let Dr Jacob Velazco or primary RN know as soon as she makes a decision. Patient was given call light to call out if needed and will continue to monitor. Patient on cardiac monitor. Lights were dimmed. Will report conversation with patient to primary RN.        Ashlee Chopra RN  05/17/22 [address] [facility]  05/17/22 1038

## 2022-05-17 NOTE — Clinical Note
Valentina Palomares was seen and treated in our emergency department on 5/17/2022. She may return to work on 05/18/2022. If you have any questions or concerns, please don't hesitate to call.       Brad Medina MD

## 2022-05-17 NOTE — ED PROVIDER NOTES
[facility]  eMERGENCY dEPARTMENT eNCOUnter      Pt Name: Celina Mckinney  MRN: [id number]  [place] 1970  Date of evaluation: 5/17/2022  Provider: Liudmila Bee MD    CHIEF COMPLAINT       Chief Complaint   Patient presents with    Numbness     since 6817 this morning tia last week pt reports face, and arm to the right. pt notes blurred vision          HISTORY OF PRESENT ILLNESS   (Location/Symptom, Timing/Onset,Context/Setting, Quality, Duration, Modifying Factors, Severity)  Note limiting factors. Celina Mckinney is a 50 y.o. female who presents to the emergency department with numbness on the right side of her face and mostly her arm and a little bit in of her leg starting at 850 or 9:00 this morning. The patient states she was just here in the last week she had a full work-up by neurology and was post to follow-up it does not have appointment yet. Patient has been taking a baby aspirin starting in the last couple days along with Plavix 75 mg. Patient states she just does not feel well. She denies any severe headache she has had no trauma. She can speak normally she denies any weakness in her arms or legs. The history is provided by the patient, medical records and a relative. NursingNotes were reviewed. REVIEW OF SYSTEMS    (2-9 systems for level 4, 10 or more for level 5)     Review of Systems   Constitutional: Negative for fever. Respiratory: Negative for cough and shortness of breath. Cardiovascular: Negative for chest pain. Gastrointestinal: Negative for abdominal pain. Neurological: Positive for numbness. Negative for dizziness, syncope, facial asymmetry, weakness and headaches. Psychiatric/Behavioral: Negative for confusion. A complete review of systems was performed and is negative except as noted above in the HPI.        PAST MEDICAL HISTORY     Past Medical History:   Diagnosis Date    Allergic rhinitis 11/10/2021    Anxiety 6/4/2020    Asthma     Chronic back pain 2018    Chronic migraine without aura, intractable, without status migrainosus     Colon polyps     Depression 2018    Elevated blood pressure, situational 2018    Essential tremor     Fibromyalgia     Liver disease     Fatty Liver    Meniere disease 2017    Osteoarthritis     Sciatica of right side 2020    Tinnitus          SURGICAL HISTORY       Past Surgical History:   Procedure Laterality Date     SECTION      CHOLECYSTECTOMY, LAPAROSCOPIC  2015    Dr. Hemanth Gonzalez?    ?    HYSTERECTOMY      LIVER BIOPSY  2015    Dr. Baudilio Santos       Discharge Medication List as of 2022 12:54 PM      CONTINUE these medications which have NOT CHANGED    Details   hydroCHLOROthiazide (HYDRODIURIL) 25 MG tablet Take 0.5 tablets by mouth daily, Disp-30 tablet, R-5Normal      amLODIPine (NORVASC) 5 MG tablet Take 1 tablet by mouth nightly, Disp-30 tablet, R-2Normal      aspirin 81 MG EC tablet Take 1 tablet by mouth every evening, Disp-30 tablet, R-5Normal      clopidogrel (PLAVIX) 75 MG tablet Take 1 tablet by mouth every evening, Disp-30 tablet, R-5Normal      amitriptyline (ELAVIL) 25 MG tablet Take 2 tablets by mouth nightly, Disp-60 tablet, R-2Normal      rosuvastatin (CRESTOR) 40 MG tablet Take 1 tablet by mouth nightly, Disp-30 tablet, R-3Normal      pantoprazole (PROTONIX) 20 MG tablet Take 1 tablet by mouth daily, Disp-30 tablet, R-3Normal      vitamin C (ASCORBIC ACID) 500 MG tablet Take 500 mg by mouth dailyHistorical Med      zinc 50 MG CAPS Take 50 mg by mouth dailyHistorical Med      busPIRone (BUSPAR) 5 MG tablet TAKE 1 TABLET BY MOUTH THREE TIMES DAILY AS NEEDED FOR ANXIETY (NO FURTHER REFILLS UNTIL SEEN IN OFFICE), Disp-90 tablet, R-0Normal      desvenlafaxine succinate (PRISTIQ) 50 MG TB24 extended release tablet TAKE 1 TABLET BY MOUTH ONCE DAILY, Disp-30 tablet, R-2Normal      fluticasone (FLONASE) 50 MCG/ACT nasal spray 1 spray by Nasal route daily, Disp-1 each, R-5Normal      vitamin E 1000 units capsule Take 1,000 Units by mouth dailyHistorical Med             ALLERGIES     Latex, Codeine, Morphine, Morphine and related, and Eggs or egg-derived products    FAMILY HISTORY       Family History   Problem Relation Age of Onset    Colon Polyps Paternal Grandfather     Liver Cancer Paternal Grandfather     Colon Polyps Mother     Liver Cancer Mother     Heart Disease Mother     Cancer Mother     Diabetes Mother     Colon Polyps Maternal Grandmother     Heart Disease Father     Cancer Father     Colon Cancer Neg Hx     Esophageal Cancer Neg Hx     Liver Disease Neg Hx     Rectal Cancer Neg Hx     Stomach Cancer Neg Hx           SOCIAL HISTORY       Social History     Socioeconomic History    Marital status:      Spouse name: None    Number of children: None    Years of education: None    Highest education level: None   Occupational History    None   Tobacco Use    Smoking status: Never Smoker    Smokeless tobacco: Never Used   Vaping Use    Vaping Use: Never used   Substance and Sexual Activity    Alcohol use: No    Drug use: No    Sexual activity: None   Other Topics Concern    None   Social History Narrative    None     Social Determinants of Health     Financial Resource Strain:     Difficulty of Paying Living Expenses: Not on file   Food Insecurity:     Worried About Running Out of Food in the Last Year: Not on file    Yadira of Food in the Last Year: Not on file   Transportation Needs:     Lack of Transportation (Medical): Not on file    Lack of Transportation (Non-Medical):  Not on file   Physical Activity:     Days of Exercise per Week: Not on file    Minutes of Exercise per Session: Not on file   Stress:     Feeling of Stress : Not on file   Social Connections:     Frequency of Communication with Friends and Family: Not on file    Frequency of Social Gatherings with Friends and Family: Not on file    Attends Shinto Services: Not on file    Active Member of Clubs or Organizations: Not on file    Attends Club or Organization Meetings: Not on file    Marital Status: Not on file   Intimate Partner Violence:     Fear of Current or Ex-Partner: Not on file    Emotionally Abused: Not on file    Physically Abused: Not on file    Sexually Abused: Not on file   Housing Stability:     Unable to Pay for Housing in the Last Year: Not on file    Number of [place] in the Last Year: Not on file    Unstable Housing in the Last Year: Not on file       SCREENINGS   NIH Stroke Scale  Interval: Reassessment  Level of Consciousness (1a): Alert  LOC Questions (1b): Answers both correctly  LOC Commands (1c): Performs both tasks correctly  Best Gaze (2): Normal  Visual (3): No visual loss (bilaterally blurry vision improving)  Facial Palsy (4): Normal symmetrical movement  Motor Arm, Left (5a): No drift  Motor Arm, Right (5b): No drift  Motor Leg, Left (6a): No drift  Motor Leg, Right (6b): No drift  Limb Ataxia (7): Absent  Sensory (8): (!) Mild to Moderate  Best Language (9): No aphasia  Dysarthria (10): Normal  Extinction and Inattention (11): No abnormality  Total: 1Glasgow Coma Scale  Eye Opening: Spontaneous  Best Verbal Response: Oriented  Best Motor Response: Obeys commands  Lino Coma Scale Score: 15        PHYSICAL EXAM    (up to 7 for level 4, 8 or more for level 5)     ED Triage Vitals [05/17/22 0915]   BP Temp Temp src Pulse Resp SpO2 Height Weight   (!) 172/98 98 °F (36.7 °C) -- 84 16 99 % -- 153 lb (69.4 kg)       Physical Exam  Vitals and nursing note reviewed. Constitutional:       General: She is not in acute distress. Appearance: Normal appearance. She is not toxic-appearing. HENT:      Head: Normocephalic and atraumatic. Mouth/Throat:      Mouth: Mucous membranes are moist.   Eyes:      Extraocular Movements: Extraocular movements intact. Pupils: Pupils are equal, round, and reactive to light. Cardiovascular:      Rate and Rhythm: Normal rate and regular rhythm. Pulses: Normal pulses. Heart sounds: No murmur heard. Pulmonary:      Effort: Pulmonary effort is normal.      Breath sounds: Normal breath sounds. Abdominal:      General: Abdomen is flat. There is no distension. Palpations: Abdomen is soft. Tenderness: There is no abdominal tenderness. Musculoskeletal:         General: No tenderness. Normal range of motion. Cervical back: Normal range of motion and neck supple. Skin:     General: Skin is warm and dry. Capillary Refill: Capillary refill takes less than 2 seconds. Neurological:      Mental Status: She is alert. GCS: GCS eye subscore is 4. GCS verbal subscore is 5. GCS motor subscore is 6. Cranial Nerves: No dysarthria or facial asymmetry. Sensory: Sensory deficit present. Motor: Motor function is intact. Coordination: Coordination is intact. Gait: Gait is intact. Comments: Patient with right-sided facial and right arm and leg numbness. No weakness she describes it as when your tooth is trying to wake up after lidocaine. Psychiatric:         Mood and Affect: Mood normal.         Behavior: Behavior normal.         Thought Content: Thought content normal.         Judgment: Judgment normal.         DIAGNOSTIC RESULTS     EKG: All EKG's are interpreted by the Emergency Department Physician who either signs or Co-signs this chart in the absence of a cardiologist.    EKG sinus rhythm rate 75 no obvious acute ischemia. QTC 4 6 7 ms QRS 96 ms.     RADIOLOGY:   Non-plain film images such as CT, Ultrasound and MRI are read by the radiologist. Plainradiographic images are visualized and preliminarily interpreted by the emergency physician with the below findings:        Interpretation per the Radiologist below, if available at the time of this note:    XR CHEST PORTABLE Final Result   1. No radiographic evidence of acute cardiopulmonary process. Signed by Dr Yimi Wick   Final Result   1. A fetal type left posterior circulation. Otherwise normal CT   angiography of the head and neck. 2. The NASCET criteria utilized for evaluation/estimation of carotid   arterial stenosis. Signed by Dr Catherine Santos   Final Result   1. No acute intracranial abnormality is seen. Signed by Dr Chester Asher            ED BEDSIDE ULTRASOUND:   Performed by ED Physician - none    LABS:  Labs Reviewed   CBC WITH AUTO DIFFERENTIAL - Abnormal; Notable for the following components:       Result Value    MCHC 32.0 (*)     Neutrophils % 65.4 (*)     All other components within normal limits   COMPREHENSIVE METABOLIC PANEL W/ REFLEX TO MG FOR LOW K - Abnormal; Notable for the following components:    Sodium 130 (*)     CO2 21 (*)     Glucose 177 (*)     Calcium 8.1 (*)     Total Protein 5.7 (*)     Albumin 3.3 (*)     All other components within normal limits   APTT - Abnormal; Notable for the following components:    aPTT 24.5 (*)     All other components within normal limits   POCT GLUCOSE - Normal   TROPONIN   PROTIME-INR       All other labs were within normal range or not returned as of this dictation. EMERGENCY DEPARTMENT COURSE and DIFFERENTIALDIAGNOSIS/MDM:   Vitals:    Vitals:    05/17/22 1030 05/17/22 1100 05/17/22 1130 05/17/22 1200   BP: 130/80 135/84 129/85 127/83   Pulse: 73 67 71 78   Resp: 9 11 13 13   Temp:       SpO2: 99% 99% 98% 97%   Weight:           MDM  Number of Diagnoses or Management Options  Essential hypertension  TIA (transient ischemic attack)  Diagnosis management comments:   Patient was stroke alerted on arrival.  Patient with similar symptoms with work-up that was normal including MRI echo in the last weeks. Taking aspirin and Plavix.     9:58 AM  Spoke with Dr Kashmir Quiñonez just had whole work up    Doesn't recommend TPA    Pt already on asa and plavix    Don't allow hypotension    Nothing else much to do per neuro       10:15 AM  I spoke with pt and with Jannie Jean Baptiste RN Stroke coordinator present    Pt declines TPA when offered    Also states her symptoms are improved    NIH SS < 3     She doesn't accept any risk of bleeding with tpa and doesn't want drug understanding she could stay like this or worsen etc    She has no trouble speaking can use her extremities and has no motor issues and understands        At time of discharge patient  and I had a long conversation. She is at her normal baseline she has no more symptoms they have completely resolved I do believe this to be likely a small vessel TIA. I made her an appointment with Dr. Thania Saleem for Friday. She will continue on her aspirin and Plavix we discussed hypotension being bad she might cut back a little bit on her blood pressure medication. The patient otherwise may have the symptoms occur again and fortunately is really nothing to do. It so mild tPA would likely give her more of a risk after discussion with her that she does not want as well as the fact that these have resolved every time. Patient is stable for discharge at this time consult placed and discussed at length with Dr. Thania Saleem. With follow-up plan in place. Patient has been understand and all the questions asked.        Amount and/or Complexity of Data Reviewed  Clinical lab tests: ordered and reviewed  Tests in the radiology section of CPT®: ordered and reviewed  Decide to obtain previous medical records or to obtain history from someone other than the patient: yes  Obtain history from someone other than the patient: yes  Discuss the patient with other providers: yes  Independent visualization of images, tracings, or specimens: yes    Patient Progress  Patient progress: improved        CONSULTS:  IP CONSULT TO PHARMACY  PHARMACY TO CHANGE BASE FLUIDS    PROCEDURES:  Unless otherwise notedbelow, none     Procedures    FINAL IMPRESSION     1. TIA (transient ischemic attack)    2.  Essential hypertension          DISPOSITION/PLAN   DISPOSITION        PATIENT REFERRED TO:  MD Vidal Rajput [address] [place] [address]  [phone]    Go to   [facility] at [address] [place] [address]:  Discharge Medication List as of 5/17/2022 12:54 PM             (Please note that portions of this note were completed with a voice recognition program.  Efforts were made to edit the dictations butoccasionally words are mis-transcribed.)    Crys Davies MD (electronically signed)  AttendingEmergency Physician         Crys Davies MD  05/17/22 0934

## 2022-05-17 NOTE — ED NOTES
Reported conversation with Dr Hebert Reyes and patient to primary RN, Melissa Wang, SAUL  05/17/22 6528

## 2022-05-20 ENCOUNTER — OFFICE VISIT (OUTPATIENT)
Dept: NEUROLOGY | Age: 52
End: 2022-05-20
Payer: COMMERCIAL

## 2022-05-20 VITALS
HEIGHT: 65 IN | HEART RATE: 84 BPM | DIASTOLIC BLOOD PRESSURE: 85 MMHG | BODY MASS INDEX: 25.49 KG/M2 | WEIGHT: 153 LBS | SYSTOLIC BLOOD PRESSURE: 124 MMHG

## 2022-05-20 DIAGNOSIS — I10 ESSENTIAL HYPERTENSION: ICD-10-CM

## 2022-05-20 DIAGNOSIS — I63.9 CEREBROVASCULAR ACCIDENT (CVA), UNSPECIFIED MECHANISM (HCC): Primary | ICD-10-CM

## 2022-05-20 DIAGNOSIS — R20.0 NUMBNESS: ICD-10-CM

## 2022-05-20 DIAGNOSIS — R53.1 WEAKNESS: ICD-10-CM

## 2022-05-20 PROCEDURE — 99214 OFFICE O/P EST MOD 30 MIN: CPT | Performed by: PSYCHIATRY & NEUROLOGY

## 2022-05-20 NOTE — PROGRESS NOTES
Chief Complaint   Patient presents with   [facility] Patient     ED follow up for TIA        Cirilo Drake is a 50y.o. year old female who is seen for evaluation of right-sided numbness. The patient was seen in the hospital on 5/9/22 by Dr. Naldo Kearney with recurrent episodes of right-sided numbness and tingling over 4 to 5-day period. She has had over a dozen episodes of numbness that starts in the right face and quickly spread to the right arm and hand. This lasted couple of minutes and resolved. More recently she had an episode where he will involve the entire right side of the face arm torso and leg. On 1 episode she tried to get up with assistance and had difficulty. Her MRI of the brain on that admission was unremarkable with no evidence of acute ischemic change. CTA of the head was unremarkable except for a fetal origin of the left posterior cerebral artery, a normal variant. 2D echocardiogram revealed a normal left ventricular size with hyperdynamic left ventricular systolic function with an ejection fraction estimated at 65 to 70%. She had a normal left atrial size. No evidence of a PFO. Thrombotic reflux panel was unremarkable. Patient was discharged on aspirin, Plavix and statin. She returns about a week later to the emergency room with some right face, arm and little bit of leg numbness. Work-up in the ER was unremarkable. She was discharged home. She follows up today. She indicates she continues to have multiple episodes of numbness on the right side daily. She does notice some mild weakness.     Active Ambulatory Problems     Diagnosis Date Noted    Irritable bowel syndrome with diarrhea 09/23/2015    Transaminitis 09/23/2015    Hepatic steatosis 09/23/2015    History of liver biopsy 09/23/2015    Chronic bronchitis ([address] 10/27/2015    Tremor 02/19/2016    Moderate persistent asthma without complication 03/38/1622    Vitamin D deficiency 02/19/2016    Essential tremor     Chronic migraine without aura, intractable, with status migrainosus     Chronic migraine without aura, intractable, without status migrainosus     Fatigue     Chest pain, exertional     COPD (chronic obstructive pulmonary disease) with chronic bronchitis (HCC) 08/15/2017    Prediabetes 08/15/2017    Elevated blood pressure, situational 2018    Fibromyalgia 2018    Other chronic pain 2018    Chronic back pain 2018    Insomnia 2018    Vaginal atrophy 2018    Hormone deficiency 2018    Depression 2018    Anxiety 2020    Sciatica of right side 2020    Hypertension, essential, benign 2021    Breast cancer screening by mammogram 2021    Chronic bilateral low back pain with sciatica 2021    Allergic rhinitis 11/10/2021    Transient ischemic attack (TIA) 2022    Cerebrovascular accident (CVA) ([facility] [address] 2022    Numbness 2022    Weakness 2022    Essential hypertension 2022     Resolved Ambulatory Problems     Diagnosis Date Noted    No Resolved Ambulatory Problems     Past Medical History:   Diagnosis Date    Asthma     Colon polyps     Liver disease     Meniere disease 2017    Osteoarthritis     Tinnitus        Past Surgical History:   Procedure Laterality Date     SECTION      CHOLECYSTECTOMY, LAPAROSCOPIC  2015    Dr. Shirley Wolf  ?    ?    HYSTERECTOMY      LIVER BIOPSY  2015    Dr. Guerita Zavala       Family History   Problem Relation Age of Onset    Colon Polyps Paternal Grandfather     Liver Cancer Paternal Grandfather     Colon Polyps Mother     Liver Cancer Mother     Heart Disease Mother     Cancer Mother     Diabetes Mother     Colon Polyps Maternal Grandmother     Heart Disease Father     Cancer Father     Colon Cancer Neg Hx     Esophageal Cancer Neg Hx     Liver Disease Neg Hx     Rectal Cancer Neg Hx     Stomach Cancer Neg Hx        Allergies Allergen Reactions    Latex Rash, Itching, Swelling and Hives    Codeine Anaphylaxis    Morphine Anaphylaxis    Morphine And Related Anaphylaxis    Eggs Or Egg-Derived Products        Social History     Socioeconomic History    Marital status:      Spouse name: Not on file    Number of children: Not on file    Years of education: Not on file    Highest education level: Not on file   Occupational History    Not on file   Tobacco Use    Smoking status: Never Smoker    Smokeless tobacco: Never Used   Vaping Use    Vaping Use: Never used   Substance and Sexual Activity    Alcohol use: No    Drug use: No    Sexual activity: Not on file   Other Topics Concern    Not on file   Social History Narrative    Not on file     Social Determinants of Health     Financial Resource Strain:     Difficulty of Paying Living Expenses: Not on file   Food Insecurity:     Worried About Running Out of Food in the Last Year: Not on file    Yadira of Food in the Last Year: Not on file   Transportation Needs:     Lack of Transportation (Medical): Not on file    Lack of Transportation (Non-Medical):  Not on file   Physical Activity:     Days of Exercise per Week: Not on file    Minutes of Exercise per Session: Not on file   Stress:     Feeling of Stress : Not on file   Social Connections:     Frequency of Communication with Friends and Family: Not on file    Frequency of Social Gatherings with Friends and Family: Not on file    Attends Faith Services: Not on file    Active Member of Clubs or Organizations: Not on file    Attends Club or Organization Meetings: Not on file    Marital Status: Not on file   Intimate Partner Violence:     Fear of Current or Ex-Partner: Not on file    Emotionally Abused: Not on file    Physically Abused: Not on file    Sexually Abused: Not on file   Housing Stability:     Unable to Pay for Housing in the Last Year: Not on file    Number of [place] in the Last Year: Not on file    Unstable Housing in the Last Year: Not on file       Review of Systems    Constitutional - No fever or chills. No diaphoresis or significant fatigue. HENT -  No tinnitus or significant hearing loss. Eyes - no sudden vision change or eye pain  Respiratory - no significant shortness of breath or cough  Cardiovascular - no chest pain No palpitations or significant leg swelling  Gastrointestinal - no abdominal swelling or pain. Genitourinary - No difficulty urinating, dysuria  Musculoskeletal - no back pain or myalgia. Skin - no color change or rash  Neurologic - No seizures. No lateralizing weakness. Hematologic - no easy bruising or excessive bleeding. Psychiatric - no severe anxiety or nervousness. All other review of systems are negative.       Current Outpatient Medications   Medication Sig Dispense Refill    hydroCHLOROthiazide (HYDRODIURIL) 25 MG tablet Take 0.5 tablets by mouth daily 30 tablet 5    amLODIPine (NORVASC) 5 MG tablet Take 1 tablet by mouth nightly 30 tablet 2    aspirin 81 MG EC tablet Take 1 tablet by mouth every evening 30 tablet 5    clopidogrel (PLAVIX) 75 MG tablet Take 1 tablet by mouth every evening 30 tablet 5    amitriptyline (ELAVIL) 25 MG tablet Take 2 tablets by mouth nightly 60 tablet 2    rosuvastatin (CRESTOR) 40 MG tablet Take 1 tablet by mouth nightly 30 tablet 3    pantoprazole (PROTONIX) 20 MG tablet Take 1 tablet by mouth daily 30 tablet 3    vitamin C (ASCORBIC ACID) 500 MG tablet Take 500 mg by mouth daily      zinc 50 MG CAPS Take 50 mg by mouth daily      busPIRone (BUSPAR) 5 MG tablet TAKE 1 TABLET BY MOUTH THREE TIMES DAILY AS NEEDED FOR ANXIETY (NO FURTHER REFILLS UNTIL SEEN IN OFFICE) 90 tablet 0    desvenlafaxine succinate (PRISTIQ) 50 MG TB24 extended release tablet TAKE 1 TABLET BY MOUTH ONCE DAILY 30 tablet 2    fluticasone (FLONASE) 50 MCG/ACT nasal spray 1 spray by Nasal route daily (Patient taking differently: 1 spray by Nasal route 2 times daily as needed ) 1 each 5    vitamin E 1000 units capsule Take 1,000 Units by mouth daily       Current Facility-Administered Medications   Medication Dose Route Frequency Provider Last Rate Last Admin    bupivacaine (MARCAINE) 0.5 % injection 15 mg  3 mL Intra-artICUlar Once Lutricia Argue, APRN        lidocaine 1 % injection 3 mL  3 mL IntraDERmal Once Lutricia Argue, APRN           /85   Pulse 84   Ht 5' 5\" (1.651 m)   Wt 153 lb (69.4 kg)   BMI 25.46 kg/m²     Constitutional - well developed, well nourished. Eyes - conjunctiva normal.  Pupils not tested  Ear, nose, throat -hearing intact to finger rub No scars, masses, or lesions over external nose or ears, no atrophy of tongue  Neck-symmetric, no masses noted, no jugular vein distension  Respiration- chest wall appears symmetric, good expansion,   normal effort without use of accessory muscles  Musculoskeletal - no significant wasting of muscles noted, no bony deformities  Extremities-no clubbing, cyanosis or edema  Skin - warm, dry, and intact. No rash, erythema, or pallor.   Psychiatric - mood, affect, and behavior appear normal.      Neurological exam  Awake, alert, fluent oriented x 3 appropriate affect  Attention and concentration appear appropriate  Recent and remote memory appears unremarkable  Speech normal without dysarthria  No clear issues with language of fund of knowledge    Cranial Nerve Exam   CN II- Visual fields grossly unremarkable  CN III, IV,VI-EOMI, No nystagmus, conjugate eye movements, no ptosis  CN V-sensation intact to LT over face  CN VII-no facial assymetry  CN VIII-Hearing intact to finger rub  CN IX and X- Palate not tested  CN XI-not test shoulder shrug  CN XII-Tongue midline with no fasciculations or fibrillations    Motor Exam  Trace giveaway weakness in the right arm and right leg, no cogwheeling, normal tone  Weakness in right hand     Sensory Exam  Sensation intact to light touch and temperature upper and lower extremities bilaterally    Reflexes   Not tested    Tremors- no tremors in hands or head noted    Gait  Normal base and speed  No ataxia    Coordination  Finger to nose-unremarkable    Lab Results   Component Value Date    [id number] 446 05/09/2022     Lab Results   Component Value Date    WBC 7.6 05/17/2022    HGB 14.3 05/17/2022    HCT 44.7 05/17/2022    MCV 94.3 05/17/2022     05/17/2022     Lab Results   Component Value Date     (L) 05/17/2022    K 3.9 05/17/2022     05/17/2022    CO2 21 (L) 05/17/2022    BUN 9 05/17/2022    CREATININE 0.6 05/17/2022    GLUCOSE 177 (H) 05/17/2022    CALCIUM 8.1 (L) 05/17/2022    PROT 5.7 (L) 05/17/2022    LABALBU 3.3 (L) 05/17/2022    BILITOT <0.2 05/17/2022    ALKPHOS 53 05/17/2022    AST 21 05/17/2022    ALT 30 05/17/2022    LABGLOM >60 05/17/2022    GFRAA >59 05/17/2022    GLOB 2.7 07/05/2016           Assessment    ICD-10-CM    1. Cerebrovascular accident (CVA), unspecified mechanism ([facility] [address]  I63.9    2. Numbness  R20.0    3. Weakness  R53.1    4. Essential hypertension  I10        Neurological examination today was significant for some mild giveaway weakness on the right including some right  weakness. Based upon her history and examination most likely etiology would be a small lacunar infarct not present on MRI. Aura without migraine and stress reaction are in the differential.  At this time I indicated that the symptoms may continue to fluctuate for some time. I indicated she should avoid getting up too quickly or avoiding low blood pressure. She is to continue on her aspirin, Plavix and statin. Her work-up was reviewed in detail. No further recommendations were provided. She is to follow-up with me on a as needed basis and call with any further problems. Plan    No orders of the defined types were placed in this encounter.       No orders of the defined types were placed in this encounter. Return if symptoms worsen or fail to improve. EMR Dragon/transcription disclaimer:Significant part of this  encounter note is electronic transcription/translation of spoken language to printed text. The electronic translation of spoken language may be erroneous, or at times, nonsensical words or phrases may be inadvertently transcribed.  Although I have reviewed the note for such errors, some may still exist.

## 2022-05-26 ENCOUNTER — TELEPHONE (OUTPATIENT)
Dept: PRIMARY CARE CLINIC | Age: 52
End: 2022-05-26

## 2022-05-26 NOTE — TELEPHONE ENCOUNTER
Patient called and asked if she was okay to get off her blood thinner before getting her colonoscopy done on June 8th in [place]. I told pt that JUAN JOSE Rizzo was not in office, but I would check with one of the other providers. She said she had a stroke about 2 weeks ago and has had mini strokes since. She has been in the hospital twice recently due to this. After getting verbal order from Dr. Kristina Nath, I called the patient back and told her that he recommends contacting Dr. Lv Vazquez to see what he recommends. Patient said she could push the appointment out since the colonoscopy was not an urgent matter. She said she would reach out to Dr. Lv Vazquez.

## 2022-05-30 NOTE — PATIENT INSTRUCTIONS
Patient Education        Sinusitis: Care Instructions  Your Care Instructions    Sinusitis is an infection of the lining of the sinus cavities in your head. Sinusitis often follows a cold. It causes pain and pressure in your head and face. In most cases, sinusitis gets better on its own in 1 to 2 weeks. But some mild symptoms may last for several weeks. Sometimes antibiotics are needed. Follow-up care is a key part of your treatment and safety. Be sure to make and go to all appointments, and call your doctor if you are having problems. It's also a good idea to know your test results and keep a list of the medicines you take. How can you care for yourself at home? · Take an over-the-counter pain medicine, such as acetaminophen (Tylenol), ibuprofen (Advil, Motrin), or naproxen (Aleve). Read and follow all instructions on the label. · If the doctor prescribed antibiotics, take them as directed. Do not stop taking them just because you feel better. You need to take the full course of antibiotics. · Be careful when taking over-the-counter cold or flu medicines and Tylenol at the same time. Many of these medicines have acetaminophen, which is Tylenol. Read the labels to make sure that you are not taking more than the recommended dose. Too much acetaminophen (Tylenol) can be harmful. · Breathe warm, moist air from a steamy shower, a hot bath, or a sink filled with hot water. Avoid cold, dry air. Using a humidifier in your home may help. Follow the directions for cleaning the machine. · Use saline (saltwater) nasal washes to help keep your nasal passages open and wash out mucus and bacteria. You can buy saline nose drops at a grocery store or drugstore. Or you can make your own at home by adding 1 teaspoon of salt and 1 teaspoon of baking soda to 2 cups of distilled water. If you make your own, fill a bulb syringe with the solution, insert the tip into your nostril, and squeeze gently. Amina Ok your nose.   · Put a hot, wet towel or a warm gel pack on your face 3 or 4 times a day for 5 to 10 minutes each time. · Try a decongestant nasal spray like oxymetazoline (Afrin). Do not use it for more than 3 days in a row. Using it for more than 3 days can make your congestion worse. When should you call for help? Call your doctor now or seek immediate medical care if:    · You have new or worse swelling or redness in your face or around your eyes.     · You have a new or higher fever.    Watch closely for changes in your health, and be sure to contact your doctor if:    · You have new or worse facial pain.     · The mucus from your nose becomes thicker (like pus) or has new blood in it.     · You are not getting better as expected. Where can you learn more? Go to https://[employer]. org and sign in to your [facility] account. Enter I620 in the [employer] box to learn more about \"Sinusitis: Care Instructions. \"     If you do not have an account, please click on the \"Sign Up Now\" link. Current as of: March 27, 2018  Content Version: 11.9  © 8843-6148 [employer]. Care instructions adapted under license by [facility] ([facility] If you have questions about a medical condition or this instruction, always ask your healthcare professional. [address] any warranty or liability for your use of this information. General

## 2022-07-11 DIAGNOSIS — F41.9 ANXIETY: ICD-10-CM

## 2022-07-11 RX ORDER — BUSPIRONE HYDROCHLORIDE 5 MG/1
TABLET ORAL
Qty: 51 TABLET | Refills: 0 | Status: SHIPPED | OUTPATIENT
Start: 2022-07-11 | End: 2022-07-28 | Stop reason: SDUPTHER

## 2022-07-11 NOTE — TELEPHONE ENCOUNTER
Akosua Hammonds called to request a refill on her medication.       Last office visit : 5/11/2022   Next office visit : 7/28/2022     Requested Prescriptions     Pending Prescriptions Disp Refills    busPIRone (BUSPAR) 5 MG tablet 51 tablet 0     Sig: TAKE 1 TABLET BY MOUTH THREE TIMES DAILY AS NEEDED FOR ANXIETY (NO FURTHER REFILLS UNTIL SEEN IN OFFICE)            Ion Ahuja

## 2022-07-28 ENCOUNTER — TELEPHONE (OUTPATIENT)
Dept: NEUROLOGY | Age: 52
End: 2022-07-28

## 2022-07-28 ENCOUNTER — OFFICE VISIT (OUTPATIENT)
Dept: PRIMARY CARE CLINIC | Age: 52
End: 2022-07-28
Payer: COMMERCIAL

## 2022-07-28 VITALS
HEART RATE: 93 BPM | WEIGHT: 148.2 LBS | HEIGHT: 65 IN | TEMPERATURE: 98.5 F | OXYGEN SATURATION: 99 % | DIASTOLIC BLOOD PRESSURE: 84 MMHG | BODY MASS INDEX: 24.69 KG/M2 | SYSTOLIC BLOOD PRESSURE: 130 MMHG

## 2022-07-28 DIAGNOSIS — B37.31 VAGINA, CANDIDIASIS: ICD-10-CM

## 2022-07-28 DIAGNOSIS — M54.40 CHRONIC BILATERAL LOW BACK PAIN WITH SCIATICA, SCIATICA LATERALITY UNSPECIFIED: ICD-10-CM

## 2022-07-28 DIAGNOSIS — G89.29 CHRONIC BILATERAL LOW BACK PAIN WITH SCIATICA, SCIATICA LATERALITY UNSPECIFIED: ICD-10-CM

## 2022-07-28 DIAGNOSIS — N94.10 PAIN IN FEMALE GENITALIA ON INTERCOURSE: ICD-10-CM

## 2022-07-28 DIAGNOSIS — Z90.711 HISTORY OF PARTIAL HYSTERECTOMY: ICD-10-CM

## 2022-07-28 DIAGNOSIS — F41.9 ANXIETY: ICD-10-CM

## 2022-07-28 DIAGNOSIS — G45.9 TIA (TRANSIENT ISCHEMIC ATTACK): ICD-10-CM

## 2022-07-28 DIAGNOSIS — I10 HYPERTENSION, ESSENTIAL, BENIGN: ICD-10-CM

## 2022-07-28 DIAGNOSIS — Z87.42 HX OF ENDOMETRIOSIS: ICD-10-CM

## 2022-07-28 DIAGNOSIS — Z00.00 WELLNESS EXAMINATION: Primary | ICD-10-CM

## 2022-07-28 DIAGNOSIS — G89.29 OTHER CHRONIC PAIN: ICD-10-CM

## 2022-07-28 DIAGNOSIS — J30.89 ALLERGIC RHINITIS DUE TO OTHER ALLERGIC TRIGGER, UNSPECIFIED SEASONALITY: ICD-10-CM

## 2022-07-28 LAB
HEMOCCULT STL QL: NORMAL

## 2022-07-28 PROCEDURE — 82270 OCCULT BLOOD FECES: CPT | Performed by: NURSE PRACTITIONER

## 2022-07-28 PROCEDURE — 99396 PREV VISIT EST AGE 40-64: CPT | Performed by: NURSE PRACTITIONER

## 2022-07-28 RX ORDER — AMITRIPTYLINE HYDROCHLORIDE 25 MG/1
50 TABLET, FILM COATED ORAL NIGHTLY
Qty: 60 TABLET | Refills: 2 | Status: SHIPPED | OUTPATIENT
Start: 2022-07-28 | End: 2022-08-27

## 2022-07-28 RX ORDER — FLUCONAZOLE 150 MG/1
150 TABLET ORAL DAILY
Qty: 3 TABLET | Refills: 0 | Status: SHIPPED | OUTPATIENT
Start: 2022-07-28 | End: 2022-07-31

## 2022-07-28 RX ORDER — BUSPIRONE HYDROCHLORIDE 5 MG/1
TABLET ORAL
Qty: 51 TABLET | Refills: 0 | Status: SHIPPED | OUTPATIENT
Start: 2022-07-28 | End: 2022-09-08 | Stop reason: SDUPTHER

## 2022-07-28 RX ORDER — HYDROCHLOROTHIAZIDE 25 MG/1
12.5 TABLET ORAL DAILY
Qty: 30 TABLET | Refills: 5 | Status: SHIPPED | OUTPATIENT
Start: 2022-07-28 | End: 2022-08-27

## 2022-07-28 RX ORDER — AMLODIPINE BESYLATE 5 MG/1
5 TABLET ORAL NIGHTLY
Qty: 30 TABLET | Refills: 2 | Status: SHIPPED | OUTPATIENT
Start: 2022-07-28 | End: 2022-08-27

## 2022-07-28 RX ORDER — CLOPIDOGREL BISULFATE 75 MG/1
75 TABLET ORAL EVERY EVENING
Qty: 30 TABLET | Refills: 5 | Status: SHIPPED | OUTPATIENT
Start: 2022-07-28 | End: 2022-08-27

## 2022-07-28 RX ORDER — FLUTICASONE PROPIONATE 50 MCG
1 SPRAY, SUSPENSION (ML) NASAL DAILY
Qty: 1 EACH | Refills: 5 | Status: SHIPPED | OUTPATIENT
Start: 2022-07-28 | End: 2023-03-25

## 2022-07-28 RX ORDER — DESVENLAFAXINE 50 MG/1
TABLET, EXTENDED RELEASE ORAL
Qty: 30 TABLET | Refills: 2 | Status: SHIPPED | OUTPATIENT
Start: 2022-07-28

## 2022-07-28 RX ORDER — ASPIRIN 81 MG/1
81 TABLET ORAL EVERY EVENING
Qty: 30 TABLET | Refills: 5 | Status: SHIPPED | OUTPATIENT
Start: 2022-07-28 | End: 2022-08-27

## 2022-07-28 ASSESSMENT — ENCOUNTER SYMPTOMS
BLOOD IN STOOL: 0
VOMITING: 0
RHINORRHEA: 0
ABDOMINAL PAIN: 0
SHORTNESS OF BREATH: 0
NAUSEA: 0
TROUBLE SWALLOWING: 0
EYE REDNESS: 0
COUGH: 0
CONSTIPATION: 1
CHEST TIGHTNESS: 0
WHEEZING: 0
DIARRHEA: 0
SORE THROAT: 0
VOICE CHANGE: 0

## 2022-07-28 ASSESSMENT — PATIENT HEALTH QUESTIONNAIRE - PHQ9
8. MOVING OR SPEAKING SO SLOWLY THAT OTHER PEOPLE COULD HAVE NOTICED. OR THE OPPOSITE, BEING SO FIGETY OR RESTLESS THAT YOU HAVE BEEN MOVING AROUND A LOT MORE THAN USUAL: 0
6. FEELING BAD ABOUT YOURSELF - OR THAT YOU ARE A FAILURE OR HAVE LET YOURSELF OR YOUR FAMILY DOWN: 0
SUM OF ALL RESPONSES TO PHQ QUESTIONS 1-9: 0
7. TROUBLE CONCENTRATING ON THINGS, SUCH AS READING THE NEWSPAPER OR WATCHING TELEVISION: 0
4. FEELING TIRED OR HAVING LITTLE ENERGY: 0
9. THOUGHTS THAT YOU WOULD BE BETTER OFF DEAD, OR OF HURTING YOURSELF: 0
SUM OF ALL RESPONSES TO PHQ QUESTIONS 1-9: 0
2. FEELING DOWN, DEPRESSED OR HOPELESS: 0
1. LITTLE INTEREST OR PLEASURE IN DOING THINGS: 0
10. IF YOU CHECKED OFF ANY PROBLEMS, HOW DIFFICULT HAVE THESE PROBLEMS MADE IT FOR YOU TO DO YOUR WORK, TAKE CARE OF THINGS AT HOME, OR GET ALONG WITH OTHER PEOPLE: 0
3. TROUBLE FALLING OR STAYING ASLEEP: 0
SUM OF ALL RESPONSES TO PHQ QUESTIONS 1-9: 0
5. POOR APPETITE OR OVEREATING: 0
SUM OF ALL RESPONSES TO PHQ9 QUESTIONS 1 & 2: 0
SUM OF ALL RESPONSES TO PHQ QUESTIONS 1-9: 0

## 2022-07-28 NOTE — PROGRESS NOTES
[address] [facility]  [address] [facility]  [address] [place] [id number]  Dept:[phone]  Dept Fax: [phone]  Loc: [phone]        Kevin Myers is a 50 y.o. female who presents today for her medical conditions/complaints as noted below. Kevin Myers is c/o Annual Exam, Gynecologic Exam, and Hypertension        Chief Complaint   Patient presents with    Annual Exam    Gynecologic Exam    Hypertension       HPI:     Gynecologic Exam  Associated symptoms include constipation. Pertinent negatives include no abdominal pain, diarrhea, dysuria, fever, frequency, headaches, nausea, rash, sore throat, urgency or vomiting. Hypertension  Pertinent negatives include no chest pain, headaches, palpitations or shortness of breath. Postmenopausal Well Female Exam  Kevin Myers present for her screeing exam.  She is a  A0. She has beenpostmenopausal for many years. Hx of partial hysterectomy r/t endometriosis . Abnormal pap at 21, ASCUS, with colposcopy. Reports painful intercourse with any penetration. She is not on hormone replacement. She denies any vaginal discharge, dryness or dyspareunia. She denies any vaginal bleeding. She has no urinary complaints. She does perform self breast exams and does not keep up her mammograms. Last pap smear was ?  years ago. She has had an abnormal pap smear. She does have a family history of breastcancer or ovarian cancer. She does not smoke or use alcohol. She does complain of depression/ anxiety/ mood issues. Pt is also scheduled for colonoscopy in August at [facility]. She has been instructed to hold plavix but due to recent TIA/ CVA. Will need clearance from Dr. Alejandro Siddiqi. Patient reports that they have beencompliant with taking medications as directed.      Past Medical History:   Diagnosis Date    Allergic rhinitis 11/10/2021    Anxiety 2020    Asthma     Chronic back pain 2018    Chronic migraine without aura, intractable, without status migrainosus     Colon polyps     Depression 2018    Elevated blood pressure, situational 2018    Essential tremor     Fibromyalgia     Liver disease     Fatty Liver    Meniere disease 2017    Osteoarthritis     Sciatica of right side 2020    Tinnitus        Past Surgical History:   Procedure Laterality Date     SECTION      CHOLECYSTECTOMY, LAPAROSCOPIC  2015    Dr. Sonya Brantley  ?    ?     HYSTERECTOMY (CERVIX STATUS UNKNOWN)      LIVER BIOPSY  2015    Dr. Gracy Ramirez       Social History     Socioeconomic History    Marital status:      Spouse name: None    Number of children: None    Years of education: None    Highest education level: None   Tobacco Use    Smoking status: Never    Smokeless tobacco: Never   Vaping Use    Vaping Use: Never used   Substance and Sexual Activity    Alcohol use: No    Drug use: No       Family History   Problem Relation Age of Onset    Colon Polyps Paternal Grandfather     Liver Cancer Paternal Grandfather     Colon Polyps Mother     Liver Cancer Mother     Heart Disease Mother     Cancer Mother     Diabetes Mother     Colon Polyps Maternal Grandmother     Heart Disease Father     Cancer Father     Colon Cancer Neg Hx     Esophageal Cancer Neg Hx     Liver Disease Neg Hx     Rectal Cancer Neg Hx     Stomach Cancer Neg Hx        Current Outpatient Medications   Medication Sig Dispense Refill    busPIRone (BUSPAR) 5 MG tablet TAKE 1 TABLET BY MOUTH THREE TIMES DAILY AS NEEDED FOR ANXIETY (NO FURTHER REFILLS UNTIL SEEN IN OFFICE) 51 tablet 0    hydroCHLOROthiazide (HYDRODIURIL) 25 MG tablet Take 0.5 tablets by mouth daily 30 tablet 5    amLODIPine (NORVASC) 5 MG tablet Take 1 tablet by mouth nightly 30 tablet 2    aspirin 81 MG EC tablet Take 1 tablet by mouth every evening 30 tablet 5    clopidogrel (PLAVIX) 75 MG tablet Take 1 tablet by mouth every evening 30 tablet 5    amitriptyline (ELAVIL) 25 MG tablet Take 2 tablets by mouth nightly 60 tablet 2    rosuvastatin (CRESTOR) 40 MG tablet Take 1 tablet by mouth nightly 30 tablet 3    pantoprazole (PROTONIX) 20 MG tablet Take 1 tablet by mouth daily 30 tablet 3    vitamin C (ASCORBIC ACID) 500 MG tablet Take 500 mg by mouth daily      zinc 50 MG CAPS Take 50 mg by mouth daily      desvenlafaxine succinate (PRISTIQ) 50 MG TB24 extended release tablet TAKE 1 TABLET BY MOUTH ONCE DAILY 30 tablet 2    fluticasone (FLONASE) 50 MCG/ACT nasal spray 1 spray by Nasal route daily (Patient taking differently: 1 spray by Nasal route 2 times daily as needed) 1 each 5    vitamin E 1000 units capsule Take 1,000 Units by mouth daily       Current Facility-Administered Medications   Medication Dose Route Frequency Provider Last Rate Last Admin    bupivacaine (MARCAINE) 0.5 % injection 15 mg  3 mL Intra-artICUlar Once Scott Suri, APRN        lidocaine 1 % injection 3 mL  3 mL IntraDERmal Once Scott Suri, APRN           Allergies   Allergen Reactions    Latex Rash, Itching, Swelling and Hives    Codeine Anaphylaxis    Morphine Anaphylaxis    Morphine And Related Anaphylaxis    Eggs Or Egg-Derived Products        Lab Review not applicable    Subjective:   Review of Systems   Constitutional:  Negative for activity change, appetite change, fatigue, fever and unexpected weight change. HENT:  Negative for congestion, ear pain, nosebleeds, rhinorrhea, sore throat, trouble swallowing and voice change. Eyes:  Negative for redness and visual disturbance. Respiratory:  Negative for cough, chest tightness, shortness of breath and wheezing. Cardiovascular:  Negative for chest pain, palpitations and leg swelling. Gastrointestinal:  Positive for constipation. Negative for abdominal pain, blood in stool, diarrhea, nausea and vomiting. Endocrine: Negative for polydipsia, polyphagia and polyuria. Genitourinary:  Positive for vaginal pain (with intercourse).  Negative for dysuria, frequency and urgency. Musculoskeletal:  Negative for myalgias. Skin:  Negative for rash and wound. Neurological:  Negative for dizziness, speech difficulty, light-headedness and headaches. Psychiatric/Behavioral:  Negative for agitation, confusion, self-injury and suicidal ideas. The patient is not nervous/anxious. Objective:     Physical Exam  Vitals and nursing note reviewed. Constitutional:       General: She is not in acute distress. Appearance: She is well-developed. She is not diaphoretic. HENT:      Head: Normocephalic and atraumatic. Right Ear: External ear normal.      Left Ear: External ear normal.      Nose: Nose normal.      Mouth/Throat:      Pharynx: No oropharyngeal exudate. Eyes:      General:         Right eye: No discharge. Left eye: No discharge. Conjunctiva/sclera: Conjunctivae normal.      Pupils: Pupils are equal, round, and reactive to light. Cardiovascular:      Rate and Rhythm: Normal rate and regular rhythm. Heart sounds: Normal heart sounds. No murmur heard. Pulmonary:      Effort: Pulmonary effort is normal. No respiratory distress. Breath sounds: Normal breath sounds. No stridor. No wheezing or rales. Chest:      Chest wall: No tenderness. Abdominal:      General: Bowel sounds are normal. There is no distension. Palpations: Abdomen is soft. Tenderness: There is no abdominal tenderness. Genitourinary:     Labia:         Right: No rash, tenderness, lesion or injury. Left: No rash, tenderness, lesion or injury. Vagina: No signs of injury and foreign body. No vaginal discharge, erythema, tenderness or bleeding. Rectum: Guaiac result negative. No mass, tenderness or external hemorrhoid. Normal anal tone. Comments: Cervix d/t hysterectomy. Suture line scraped for collection   Musculoskeletal:         General: No tenderness or deformity. Normal range of motion.       Cervical back: Normal range of motion and neck supple. Skin:     General: Skin is warm and dry. Findings: No erythema or rash. Neurological:      Mental Status: She is alert and oriented to person, place, and time. Cranial Nerves: No cranial nerve deficit. Coordination: Coordination normal.      Deep Tendon Reflexes: Reflexes are normal and symmetric. Psychiatric:         Behavior: Behavior normal.         Thought Content: Thought content normal.     /84   Pulse 93   Temp 98.5 °F (36.9 °C) (Temporal)   Ht 5' 5\" (1.651 m)   Wt 148 lb 3.2 oz (67.2 kg)   SpO2 99%   BMI 24.66 kg/m²     Assessment:      Diagnosis Orders   1. Wellness examination        2. Anxiety        3. Other chronic pain        4. Chronic bilateral low back pain with sciatica, sciatica laterality unspecified        5. Hypertension, essential, benign        6. TIA (transient ischemic attack)        7. Allergic rhinitis due to other allergic trigger, unspecified seasonality          No results found for this visit on 07/28/22. Plan:     1. Wellness examination    2. Anxiety    3. Other chronic pain    4. Chronic bilateral low back pain with sciatica, sciatica laterality unspecified    5. Hypertension, essential, benign    6. TIA (transient ischemic attack)    7. Allergic rhinitis due to other allergic trigger, unspecified seasonality       Return if symptoms worsen or fail to improve. No orders of the defined types were placed in this encounter. No orders of the defined types were placed in this encounter. There are no Patient Instructions on file for this visit. Patient/family given educationalmaterials - see patient instructions. Discussed use, benefit, and side effects of prescribed medications. All patient/family questions answered and voiced understanding. Instructed to continue current medications, diet and exercise. Pt/family agreed with treatment plan. Follow up asdirected and sooner if needed. Patient/ family instructed that is symptoms worsen or persist they are to contact office or report [facility]. They voice understanding and agreement with this plan.      Electronically signed by Cristopher Halsted, APRN on 7/28/2022 at 9:47 AM CDT

## 2022-07-28 NOTE — TELEPHONE ENCOUNTER
Called Bhavna back to inform her of Dr. Cherylyn Brittle response and she states she wishes for him to sign off on this order stating he is ok with the pt holding her Plavix for the colonoscopy for 5 day. I gave her our fax number to send this to us.

## 2022-07-28 NOTE — TELEPHONE ENCOUNTER
Joanna Monge NP from [facility] called and states pt is scheduled for a colonoscopy Aug 17 at [address] in [place]. They have instructed the pt to stop Plavix 5 days before the procedure. Bell Files states she assumed you would not want that stopped due to the stroke the pt had. She is asking for further directions to proceed with this so the pt can have the colonoscopy. Please advise.

## 2022-07-28 NOTE — TELEPHONE ENCOUNTER
Everything is risk benefit. Its hard to know which is worse. I think the risk of having a new stroke is low and ideally we would keep her on Plavix but if the colonscopy is important then I am ok with holding the Plavix for 5 days to minimize the risk of her bleeding

## 2022-08-02 ENCOUNTER — HOSPITAL ENCOUNTER (OUTPATIENT)
Dept: WOMENS IMAGING | Age: 52
Discharge: HOME | End: 2022-08-02
Payer: COMMERCIAL

## 2022-08-02 DIAGNOSIS — Z12.31 BREAST CANCER SCREENING BY MAMMOGRAM: ICD-10-CM

## 2022-08-02 PROCEDURE — 77063 BREAST TOMOSYNTHESIS BI: CPT

## 2022-08-02 PROCEDURE — 77063 BREAST TOMOSYNTHESIS BI: CPT | Performed by: RADIOLOGY

## 2022-08-02 PROCEDURE — 77067 SCR MAMMO BI INCL CAD: CPT | Performed by: RADIOLOGY

## 2022-08-03 LAB
HPV COMMENT: NORMAL
HPV TYPE 16: NOT DETECTED
HPV TYPE 18: NOT DETECTED
HPVOH (OTHER TYPES): NOT DETECTED

## 2022-08-27 PROBLEM — Z00.00 WELLNESS EXAMINATION: Status: RESOLVED | Noted: 2021-07-16 | Resolved: 2022-08-27

## 2022-09-07 NOTE — TELEPHONE ENCOUNTER
Joan Zuleta called requesting a refill of the below medication which has been pended for you:     Requested Prescriptions     Pending Prescriptions Disp Refills    rosuvastatin (CRESTOR) 40 MG tablet [Pharmacy Med Name: ROSUVASTATIN CALCIUM 40MG TABLET] 30 tablet 3     Sig: TAKE 1 TABLET BY MOUTH NIGHTLY       Last Appointment Date: 7/28/2022  Next Appointment Date: Visit date not found    Allergies   Allergen Reactions    Latex Rash, Itching, Swelling and Hives    Codeine Anaphylaxis    Morphine Anaphylaxis    Morphine And Related Anaphylaxis    Eggs Or Egg-Derived Products

## 2022-09-08 DIAGNOSIS — F41.9 ANXIETY: ICD-10-CM

## 2022-09-08 RX ORDER — ROSUVASTATIN CALCIUM 40 MG/1
40 TABLET, COATED ORAL NIGHTLY
Qty: 30 TABLET | Refills: 3 | OUTPATIENT
Start: 2022-09-08

## 2022-09-08 RX ORDER — BUSPIRONE HYDROCHLORIDE 5 MG/1
TABLET ORAL
Qty: 90 TABLET | Refills: 0 | Status: SHIPPED | OUTPATIENT
Start: 2022-09-08

## 2022-09-08 RX ORDER — ROSUVASTATIN CALCIUM 40 MG/1
40 TABLET, COATED ORAL NIGHTLY
Qty: 30 TABLET | Refills: 3 | Status: SHIPPED | OUTPATIENT
Start: 2022-09-08

## 2022-11-07 DIAGNOSIS — F41.9 ANXIETY: ICD-10-CM

## 2022-11-07 NOTE — TELEPHONE ENCOUNTER
Mere Faye called to request a refill on her medication.       Last office visit : 7/28/2022   Next office visit : Visit date not found     Requested Prescriptions     Pending Prescriptions Disp Refills    busPIRone (BUSPAR) 5 MG tablet [Pharmacy Med Name: BUSPIRONE HYDROCHLORIDE 5MG TABLET] 90 tablet 0     Sig: TAKE 1 TABLET BY MOUTH THREE TIMES DAILY AS NEEDED FOR ANXIETY            Rosaline Romeo MA

## 2022-11-09 ENCOUNTER — OFFICE VISIT (OUTPATIENT)
Age: 52
End: 2022-11-09
Payer: COMMERCIAL

## 2022-11-09 VITALS
HEIGHT: 65 IN | SYSTOLIC BLOOD PRESSURE: 124 MMHG | HEART RATE: 79 BPM | DIASTOLIC BLOOD PRESSURE: 80 MMHG | WEIGHT: 146 LBS | OXYGEN SATURATION: 98 % | BODY MASS INDEX: 24.32 KG/M2 | TEMPERATURE: 98.6 F

## 2022-11-09 DIAGNOSIS — B02.9 HERPES ZOSTER WITHOUT COMPLICATION: Primary | ICD-10-CM

## 2022-11-09 PROCEDURE — 99213 OFFICE O/P EST LOW 20 MIN: CPT | Performed by: NURSE PRACTITIONER

## 2022-11-09 PROCEDURE — 3074F SYST BP LT 130 MM HG: CPT | Performed by: NURSE PRACTITIONER

## 2022-11-09 PROCEDURE — 3078F DIAST BP <80 MM HG: CPT | Performed by: NURSE PRACTITIONER

## 2022-11-09 RX ORDER — ACYCLOVIR 50 MG/G
OINTMENT TOPICAL
Qty: 30 G | Refills: 0 | Status: SHIPPED | OUTPATIENT
Start: 2022-11-09 | End: 2022-11-16

## 2022-11-09 RX ORDER — BUSPIRONE HYDROCHLORIDE 5 MG/1
TABLET ORAL
Qty: 90 TABLET | Refills: 1 | Status: SHIPPED | OUTPATIENT
Start: 2022-11-09

## 2022-11-09 RX ORDER — PREDNISONE 10 MG/1
TABLET ORAL
Qty: 42 TABLET | Refills: 0 | Status: SHIPPED | OUTPATIENT
Start: 2022-11-09

## 2022-11-09 RX ORDER — VALACYCLOVIR HYDROCHLORIDE 1 G/1
1000 TABLET, FILM COATED ORAL 3 TIMES DAILY
Qty: 21 TABLET | Refills: 0 | Status: SHIPPED | OUTPATIENT
Start: 2022-11-09 | End: 2022-11-16

## 2022-11-09 ASSESSMENT — ENCOUNTER SYMPTOMS
EYE ITCHING: 0
EYES NEGATIVE: 1
GASTROINTESTINAL NEGATIVE: 1
SHORTNESS OF BREATH: 0
EYE REDNESS: 0
RESPIRATORY NEGATIVE: 1
COUGH: 0
WHEEZING: 0

## 2022-11-09 NOTE — PATIENT INSTRUCTIONS
Start valtrex and take as prescribed. Take BP before and after taking steroid. If high BP discontinue steroid. Follow up if symptoms worsen or fail to improve. Apply ointment as needed.

## 2022-11-18 DIAGNOSIS — I10 HYPERTENSION, ESSENTIAL, BENIGN: ICD-10-CM

## 2022-11-20 RX ORDER — AMLODIPINE BESYLATE 5 MG/1
5 TABLET ORAL NIGHTLY
Qty: 30 TABLET | Refills: 0 | Status: SHIPPED | OUTPATIENT
Start: 2022-11-20 | End: 2022-12-16 | Stop reason: SDUPTHER

## 2022-12-14 ENCOUNTER — HOSPITAL ENCOUNTER (INPATIENT)
Age: 52
LOS: 1 days | Discharge: HOME | End: 2022-12-15
Attending: EMERGENCY MEDICINE | Admitting: INTERNAL MEDICINE
Payer: COMMERCIAL

## 2022-12-14 ENCOUNTER — APPOINTMENT (OUTPATIENT)
Dept: MRI IMAGING | Age: 52
End: 2022-12-14
Payer: COMMERCIAL

## 2022-12-14 ENCOUNTER — APPOINTMENT (OUTPATIENT)
Dept: GENERAL RADIOLOGY | Age: 52
End: 2022-12-14
Payer: COMMERCIAL

## 2022-12-14 ENCOUNTER — APPOINTMENT (OUTPATIENT)
Dept: CT IMAGING | Age: 52
End: 2022-12-14
Payer: COMMERCIAL

## 2022-12-14 DIAGNOSIS — M54.40 CHRONIC BILATERAL LOW BACK PAIN WITH SCIATICA, SCIATICA LATERALITY UNSPECIFIED: ICD-10-CM

## 2022-12-14 DIAGNOSIS — I63.9 CEREBROVASCULAR ACCIDENT (CVA), UNSPECIFIED MECHANISM (HCC): Primary | ICD-10-CM

## 2022-12-14 DIAGNOSIS — I10 HYPERTENSION, ESSENTIAL, BENIGN: ICD-10-CM

## 2022-12-14 DIAGNOSIS — G89.29 CHRONIC BILATERAL LOW BACK PAIN WITH SCIATICA, SCIATICA LATERALITY UNSPECIFIED: ICD-10-CM

## 2022-12-14 DIAGNOSIS — G89.29 OTHER CHRONIC PAIN: ICD-10-CM

## 2022-12-14 PROBLEM — R53.1 WEAKNESS OF LEFT SIDE OF BODY: Status: ACTIVE | Noted: 2022-12-14

## 2022-12-14 LAB
ALBUMIN SERPL-MCNC: 4.4 G/DL (ref 3.5–5.2)
ALP BLD-CCNC: 48 U/L (ref 35–104)
ALT SERPL-CCNC: 38 U/L (ref 5–33)
ANION GAP SERPL CALCULATED.3IONS-SCNC: 9 MMOL/L (ref 7–19)
APTT: 26 SEC (ref 26–36.2)
AST SERPL-CCNC: 28 U/L (ref 5–32)
BASOPHILS ABSOLUTE: 0.1 K/UL (ref 0–0.2)
BASOPHILS RELATIVE PERCENT: 1 % (ref 0–1)
BILIRUB SERPL-MCNC: 0.3 MG/DL (ref 0.2–1.2)
BUN BLDV-MCNC: 9 MG/DL (ref 6–20)
CALCIUM SERPL-MCNC: 9.9 MG/DL (ref 8.6–10)
CHLORIDE BLD-SCNC: 99 MMOL/L (ref 98–111)
CO2: 30 MMOL/L (ref 22–29)
CREAT SERPL-MCNC: 0.7 MG/DL (ref 0.5–0.9)
EOSINOPHILS ABSOLUTE: 0.2 K/UL (ref 0–0.6)
EOSINOPHILS RELATIVE PERCENT: 2.5 % (ref 0–5)
GFR SERPL CREATININE-BSD FRML MDRD: >60 ML/MIN/{1.73_M2}
GLUCOSE BLD-MCNC: 111 MG/DL (ref 70–99)
GLUCOSE BLD-MCNC: 125 MG/DL (ref 74–109)
HCT VFR BLD CALC: 44.3 % (ref 37–47)
HEMOGLOBIN: 15 G/DL (ref 12–16)
IMMATURE GRANULOCYTES #: 0 K/UL
INR BLD: 0.85 (ref 0.88–1.18)
LYMPHOCYTES ABSOLUTE: 2.3 K/UL (ref 1.1–4.5)
LYMPHOCYTES RELATIVE PERCENT: 31.3 % (ref 20–40)
MCH RBC QN AUTO: 30.5 PG (ref 27–31)
MCHC RBC AUTO-ENTMCNC: 33.9 G/DL (ref 33–37)
MCV RBC AUTO: 90.2 FL (ref 81–99)
MONOCYTES ABSOLUTE: 0.6 K/UL (ref 0–0.9)
MONOCYTES RELATIVE PERCENT: 8.9 % (ref 0–10)
NEUTROPHILS ABSOLUTE: 4 K/UL (ref 1.5–7.5)
NEUTROPHILS RELATIVE PERCENT: 56.2 % (ref 50–65)
PDW BLD-RTO: 12.2 % (ref 11.5–14.5)
PERFORMED ON: ABNORMAL
PLATELET # BLD: 318 K/UL (ref 130–400)
PMV BLD AUTO: 10.4 FL (ref 9.4–12.3)
POTASSIUM REFLEX MAGNESIUM: 3.7 MMOL/L (ref 3.5–5)
PROTHROMBIN TIME: 11.5 SEC (ref 12–14.6)
RBC # BLD: 4.91 M/UL (ref 4.2–5.4)
SARS-COV-2, NAAT: NOT DETECTED
SODIUM BLD-SCNC: 138 MMOL/L (ref 136–145)
TOTAL PROTEIN: 7 G/DL (ref 6.6–8.7)
TROPONIN: <0.01 NG/ML (ref 0–0.03)
WBC # BLD: 7.2 K/UL (ref 4.8–10.8)

## 2022-12-14 PROCEDURE — 6360000004 HC RX CONTRAST MEDICATION: Performed by: EMERGENCY MEDICINE

## 2022-12-14 PROCEDURE — C8929 TTE W OR WO FOL WCON,DOPPLER: HCPCS

## 2022-12-14 PROCEDURE — 6360000004 HC RX CONTRAST MEDICATION: Performed by: PSYCHIATRY & NEUROLOGY

## 2022-12-14 PROCEDURE — 71045 X-RAY EXAM CHEST 1 VIEW: CPT

## 2022-12-14 PROCEDURE — 85610 PROTHROMBIN TIME: CPT

## 2022-12-14 PROCEDURE — 70450 CT HEAD/BRAIN W/O DYE: CPT

## 2022-12-14 PROCEDURE — 80053 COMPREHEN METABOLIC PANEL: CPT

## 2022-12-14 PROCEDURE — 2580000003 HC RX 258: Performed by: EMERGENCY MEDICINE

## 2022-12-14 PROCEDURE — 1210000000 HC MED SURG R&B

## 2022-12-14 PROCEDURE — 70498 CT ANGIOGRAPHY NECK: CPT

## 2022-12-14 PROCEDURE — 85025 COMPLETE CBC W/AUTO DIFF WBC: CPT

## 2022-12-14 PROCEDURE — 99223 1ST HOSP IP/OBS HIGH 75: CPT | Performed by: PSYCHIATRY & NEUROLOGY

## 2022-12-14 PROCEDURE — 87635 SARS-COV-2 COVID-19 AMP PRB: CPT

## 2022-12-14 PROCEDURE — 82947 ASSAY GLUCOSE BLOOD QUANT: CPT

## 2022-12-14 PROCEDURE — 70553 MRI BRAIN STEM W/O & W/DYE: CPT | Performed by: RADIOLOGY

## 2022-12-14 PROCEDURE — A9577 INJ MULTIHANCE: HCPCS | Performed by: PSYCHIATRY & NEUROLOGY

## 2022-12-14 PROCEDURE — 84484 ASSAY OF TROPONIN QUANT: CPT

## 2022-12-14 PROCEDURE — 85730 THROMBOPLASTIN TIME PARTIAL: CPT

## 2022-12-14 PROCEDURE — 70553 MRI BRAIN STEM W/O & W/DYE: CPT

## 2022-12-14 PROCEDURE — 96374 THER/PROPH/DIAG INJ IV PUSH: CPT

## 2022-12-14 PROCEDURE — 99285 EMERGENCY DEPT VISIT HI MDM: CPT

## 2022-12-14 PROCEDURE — 36415 COLL VENOUS BLD VENIPUNCTURE: CPT

## 2022-12-14 PROCEDURE — 6370000000 HC RX 637 (ALT 250 FOR IP)

## 2022-12-14 PROCEDURE — 6360000002 HC RX W HCPCS: Performed by: EMERGENCY MEDICINE

## 2022-12-14 RX ORDER — ENOXAPARIN SODIUM 100 MG/ML
40 INJECTION SUBCUTANEOUS DAILY
Status: DISCONTINUED | OUTPATIENT
Start: 2022-12-15 | End: 2022-12-15 | Stop reason: HOSPADM

## 2022-12-14 RX ORDER — PANTOPRAZOLE SODIUM 20 MG/1
20 TABLET, DELAYED RELEASE ORAL DAILY
Status: DISCONTINUED | OUTPATIENT
Start: 2022-12-15 | End: 2022-12-15 | Stop reason: HOSPADM

## 2022-12-14 RX ORDER — ONDANSETRON 2 MG/ML
4 INJECTION INTRAMUSCULAR; INTRAVENOUS EVERY 6 HOURS PRN
Status: DISCONTINUED | OUTPATIENT
Start: 2022-12-14 | End: 2022-12-15 | Stop reason: HOSPADM

## 2022-12-14 RX ORDER — ASPIRIN 300 MG/1
300 SUPPOSITORY RECTAL DAILY
Status: DISCONTINUED | OUTPATIENT
Start: 2022-12-15 | End: 2022-12-15 | Stop reason: HOSPADM

## 2022-12-14 RX ORDER — HYDROCHLOROTHIAZIDE 25 MG/1
12.5 TABLET ORAL DAILY
Status: DISCONTINUED | OUTPATIENT
Start: 2022-12-15 | End: 2022-12-15 | Stop reason: HOSPADM

## 2022-12-14 RX ORDER — DESVENLAFAXINE 50 MG/1
50 TABLET, EXTENDED RELEASE ORAL DAILY
Status: DISCONTINUED | OUTPATIENT
Start: 2022-12-15 | End: 2022-12-15 | Stop reason: HOSPADM

## 2022-12-14 RX ORDER — POLYETHYLENE GLYCOL 3350 17 G/17G
17 POWDER, FOR SOLUTION ORAL DAILY PRN
Status: DISCONTINUED | OUTPATIENT
Start: 2022-12-14 | End: 2022-12-15 | Stop reason: HOSPADM

## 2022-12-14 RX ORDER — ONDANSETRON 4 MG/1
4 TABLET, ORALLY DISINTEGRATING ORAL EVERY 8 HOURS PRN
Status: DISCONTINUED | OUTPATIENT
Start: 2022-12-14 | End: 2022-12-15 | Stop reason: HOSPADM

## 2022-12-14 RX ORDER — LORAZEPAM 2 MG/ML
1 INJECTION INTRAMUSCULAR ONCE
Status: COMPLETED | OUTPATIENT
Start: 2022-12-14 | End: 2022-12-14

## 2022-12-14 RX ORDER — CLOPIDOGREL BISULFATE 75 MG/1
75 TABLET ORAL EVERY EVENING
Status: DISCONTINUED | OUTPATIENT
Start: 2022-12-14 | End: 2022-12-15 | Stop reason: HOSPADM

## 2022-12-14 RX ORDER — ASCORBIC ACID 500 MG
500 TABLET ORAL DAILY
Status: DISCONTINUED | OUTPATIENT
Start: 2022-12-15 | End: 2022-12-15 | Stop reason: HOSPADM

## 2022-12-14 RX ORDER — AMITRIPTYLINE HYDROCHLORIDE 25 MG/1
50 TABLET, FILM COATED ORAL NIGHTLY
Status: DISCONTINUED | OUTPATIENT
Start: 2022-12-14 | End: 2022-12-15 | Stop reason: HOSPADM

## 2022-12-14 RX ORDER — ASPIRIN 81 MG/1
81 TABLET ORAL DAILY
Status: DISCONTINUED | OUTPATIENT
Start: 2022-12-15 | End: 2022-12-15 | Stop reason: HOSPADM

## 2022-12-14 RX ORDER — ATORVASTATIN CALCIUM 40 MG/1
40 TABLET, FILM COATED ORAL NIGHTLY
Status: DISCONTINUED | OUTPATIENT
Start: 2022-12-14 | End: 2022-12-15 | Stop reason: HOSPADM

## 2022-12-14 RX ORDER — 0.9 % SODIUM CHLORIDE 0.9 %
1000 INTRAVENOUS SOLUTION INTRAVENOUS ONCE
Status: COMPLETED | OUTPATIENT
Start: 2022-12-14 | End: 2022-12-14

## 2022-12-14 RX ORDER — ZINC SULFATE 50(220)MG
50 CAPSULE ORAL DAILY
Status: DISCONTINUED | OUTPATIENT
Start: 2022-12-15 | End: 2022-12-15 | Stop reason: HOSPADM

## 2022-12-14 RX ORDER — AMLODIPINE BESYLATE 5 MG/1
5 TABLET ORAL NIGHTLY
Status: DISCONTINUED | OUTPATIENT
Start: 2022-12-14 | End: 2022-12-15 | Stop reason: HOSPADM

## 2022-12-14 RX ORDER — BUSPIRONE HYDROCHLORIDE 5 MG/1
5 TABLET ORAL 3 TIMES DAILY PRN
Status: DISCONTINUED | OUTPATIENT
Start: 2022-12-14 | End: 2022-12-15 | Stop reason: HOSPADM

## 2022-12-14 RX ADMIN — IOPAMIDOL 70 ML: 755 INJECTION, SOLUTION INTRAVENOUS at 08:35

## 2022-12-14 RX ADMIN — LORAZEPAM 1 MG: 2 INJECTION INTRAMUSCULAR; INTRAVENOUS at 11:48

## 2022-12-14 RX ADMIN — ATORVASTATIN CALCIUM 40 MG: 40 TABLET, FILM COATED ORAL at 23:17

## 2022-12-14 RX ADMIN — PERFLUTREN 1.5 ML: 6.52 INJECTION, SUSPENSION INTRAVENOUS at 10:34

## 2022-12-14 RX ADMIN — GADOBENATE DIMEGLUMINE 15 ML: 529 INJECTION, SOLUTION INTRAVENOUS at 12:53

## 2022-12-14 RX ADMIN — SODIUM CHLORIDE 1000 ML: 9 INJECTION, SOLUTION INTRAVENOUS at 08:44

## 2022-12-14 RX ADMIN — CLOPIDOGREL BISULFATE 75 MG: 75 TABLET ORAL at 23:17

## 2022-12-14 RX ADMIN — AMITRIPTYLINE HYDROCHLORIDE 50 MG: 25 TABLET, FILM COATED ORAL at 23:17

## 2022-12-14 ASSESSMENT — ENCOUNTER SYMPTOMS
GASTROINTESTINAL NEGATIVE: 1
SHORTNESS OF BREATH: 0
RESPIRATORY NEGATIVE: 1
ABDOMINAL PAIN: 0
VOMITING: 0
COUGH: 0
NAUSEA: 0
DIARRHEA: 0

## 2022-12-14 ASSESSMENT — PAIN - FUNCTIONAL ASSESSMENT
PAIN_FUNCTIONAL_ASSESSMENT: NONE - DENIES PAIN

## 2022-12-14 ASSESSMENT — LIFESTYLE VARIABLES: HOW OFTEN DO YOU HAVE A DRINK CONTAINING ALCOHOL: NEVER

## 2022-12-14 NOTE — ED PROVIDER NOTES
[facility]  eMERGENCY dEPARTMENT eNCOUnter      Pt Name: Kenji England  MRN: [id number]  [place] 1970  Date of evaluation: 2022  Provider: Son Jean Baptiste MD    [address] [place] [address]       Chief Complaint   Patient presents with    Extremity Weakness     P c/o left side weakness and numbness that started at 0700. HISTORY OF PRESENT ILLNESS   (Location/Symptom, Timing/Onset,Context/Setting, Quality, Duration, Modifying Factors, Severity)  Note limiting factors. Kenji England is a 50 y.o. female who presents to the emergency department for possible stroke. Patient reports onset of left-sided weakness and numbness at 7 AM while she was getting ready for work. Also feels she has had some difficulty getting her thoughts out. Tells me prior history of TIA in May no residual deficits. HPI    NursingNotes were reviewed. REVIEW OF SYSTEMS    (2-9 systems for level 4, 10 or more for level 5)     Review of Systems         PAST MEDICALHISTORY     Past Medical History:   Diagnosis Date    Allergic rhinitis 11/10/2021    Anxiety 2020    Asthma     Chronic back pain 2018    Chronic migraine without aura, intractable, without status migrainosus     Colon polyps     Depression 2018    Elevated blood pressure, situational 2018    Essential tremor     Fibromyalgia     Liver disease     Fatty Liver    Meniere disease 2017    Osteoarthritis     Sciatica of right side 2020    Tinnitus          SURGICAL HISTORY       Past Surgical History:   Procedure Laterality Date     SECTION      CHOLECYSTECTOMY, LAPAROSCOPIC  2015    Dr. Vin Caballero  ?    ?     HYSTERECTOMY (CERVIX STATUS UNKNOWN)      LIVER BIOPSY  2015    Dr. Courtney Pike     Previous Medications    AMITRIPTYLINE (ELAVIL) 25 MG TABLET    Take 2 tablets by mouth nightly    AMLODIPINE (NORVASC) 5 MG TABLET    TAKE 1 TABLET BY MOUTH NIGHTLY    ASPIRIN 81 MG EC TABLET    Take 1 tablet by mouth every evening    BUSPIRONE (BUSPAR) 5 MG TABLET    TAKE 1 TABLET BY MOUTH THREE TIMES DAILY AS NEEDED FOR ANXIETY    CLOPIDOGREL (PLAVIX) 75 MG TABLET    Take 1 tablet by mouth every evening    DESVENLAFAXINE SUCCINATE (PRISTIQ) 50 MG TB24 EXTENDED RELEASE TABLET    TAKE 1 TABLET BY MOUTH ONCE DAILY    FLUTICASONE (FLONASE) 50 MCG/ACT NASAL SPRAY    1 spray by Nasal route in the morning. HYDROCHLOROTHIAZIDE (HYDRODIURIL) 25 MG TABLET    Take 0.5 tablets by mouth in the morning. PANTOPRAZOLE (PROTONIX) 20 MG TABLET    Take 1 tablet by mouth daily    PREDNISONE (DELTASONE) 10 MG TABLET    Take 6 tabs for 2 days, 5 tabs for 2 days, 4 tabs for 2 days, 3 tabs for 2 days, 2 tabs for 2 days, 1 tab for 2 days.     ROSUVASTATIN (CRESTOR) 40 MG TABLET    Take 1 tablet by mouth nightly    VITAMIN C (ASCORBIC ACID) 500 MG TABLET    Take 500 mg by mouth daily    VITAMIN E 1000 UNITS CAPSULE    Take 1,000 Units by mouth daily    ZINC 50 MG CAPS    Take 50 mg by mouth daily       ALLERGIES     Latex, Codeine, Morphine, Morphine and related, and Eggs or egg-derived products    FAMILY HISTORY       Family History   Problem Relation Age of Onset    Breast Cancer Mother 50    Colon Polyps Mother     Liver Cancer Mother     Heart Disease Mother     Cancer Mother     Diabetes Mother     Heart Disease Father     Cancer Father     Colon Polyps Maternal Grandmother     Breast Cancer Maternal Grandfather 46    Colon Polyps Paternal Grandfather     Liver Cancer Paternal Grandfather     Colon Cancer Neg Hx     Esophageal Cancer Neg Hx     Liver Disease Neg Hx     Rectal Cancer Neg Hx     Stomach Cancer Neg Hx           SOCIAL HISTORY       Social History     Socioeconomic History    Marital status:      Spouse name: None    Number of children: None    Years of education: None    Highest education level: None   Tobacco Use    Smoking status: Never    Smokeless tobacco: Never   Vaping Use    Vaping Use: Never used Substance and Sexual Activity    Alcohol use: No    Drug use: No       SCREENINGS   NIH Stroke Scale  Interval: Baseline  Level of Consciousness (1a): Alert  LOC Questions (1b): Answers both correctly  LOC Commands (1c): Performs both tasks correctly  Best Gaze (2): Normal  Visual (3): No visual loss  Facial Palsy (4): Normal symmetrical movement  Motor Arm, Left (5a): Some effort against gravity  Motor Arm, Right (5b): No drift  Motor Leg, Left (6a): Some effort against gravity  Motor Leg, Right (6b): No drift  Limb Ataxia (7): Absent  Sensory (8): (!) Mild to Moderate  Best Language (9): No aphasia  Dysarthria (10): Normal  Extinction and Inattention (11): No abnormality  Total: 5Glasgow Coma Scale  Eye Opening: Spontaneous  Best Verbal Response: Oriented  Best Motor Response: Obeys commands  [place] Coma Scale Score: 15        PHYSICAL EXAM    (up to 7 for level 4, 8 or more for level 5)     ED Triage Vitals [12/14/22 0812]   BP Temp Temp Source Heart Rate Resp SpO2 Height Weight   (!) 146/82 99.5 °F (37.5 °C) Oral 68 16 97 % 5' 5\" (1.651 m) 145 lb (65.8 kg)       Physical Exam    DIAGNOSTIC RESULTS     EKG: All EKG's areinterpreted by the Emergency Department Physician who either signs or Co-signs this chart in the absence of a cardiologist.        RADIOLOGY:  Non-plain film images such as CT, Ultrasound and MRI are read by the radiologist. Plain radiographic images are visualized and preliminarily interpreted bythe emergency physician with the below findings:      MRI BRAIN W WO CONTRAST   Final Result   1. No significant intracranial abnormalities. Recommendation:    Follow up as clinically indicated. Dictated and Electronically Signed by Amor Marquis MD at 14-Dec-2022 02:10:06 PM               XR CHEST PORTABLE   Final Result   No acute cardiopulmonary process identified. CTA HEAD NECK W CONTRAST   Final Result   No evidence of hemodynamically significant vascular injury or aneurysm.       CT HEAD WO CONTRAST   Final Result   1. No acute intracranial abnormality identified. If there is ongoing concern for acute infarct, recommend brain MRI. LABS:  Labs Reviewed   COMPREHENSIVE METABOLIC PANEL W/ REFLEX TO MG FOR LOW K - Abnormal; Notable for the following components:       Result Value    CO2 30 (*)     Glucose 125 (*)     ALT 38 (*)     All other components within normal limits   PROTIME-INR - Abnormal; Notable for the following components:    Protime 11.5 (*)     INR 0.85 (*)     All other components within normal limits   POCT GLUCOSE - Abnormal; Notable for the following components:    POC Glucose 111 (*)     All other components within normal limits   COVID-19, RAPID   CBC WITH AUTO DIFFERENTIAL   TROPONIN   APTT   POCT GLUCOSE       All other labs were within normal range or not returned as of this dictation. EMERGENCY DEPARTMENT COURSE and DIFFERENTIAL DIAGNOSIS/MDM:   Vitals:    Vitals:    12/14/22 0812 12/14/22 0848 12/14/22 0919 12/14/22 1128   BP: (!) 146/82 125/87 (!) 109/90 125/79   Pulse: 68 69 72 66   Resp: 16 15 17 10   Temp: 99.5 °F (37.5 °C)      TempSrc: Oral      SpO2: 97% 96% 94% 94%   Weight: 145 lb (65.8 kg)      Height: 5' 5\" (1.651 m)          MDM     Amount and/or Complexity of Data Reviewed  Clinical lab tests: ordered and reviewed  Tests in the radiology section of CPT®: ordered and reviewed  Discuss the patient with other providers: yes  Independent visualization of images, tracings, or specimens: yes      Pt presents with acute onset left numbness weakness around 0700 this AM.  Acute stroke initiated. Since being here her upper extrem has improved nearly normal 5/5 strength leg still approx 4/5 but pt seems to be improving. Speech clear no facial droop. Ct/cta neg. D/w Dr. Richa Hernandez he has seen in ED would not recommend TNK and pt does not wish to proceed. Have d/w hospitalist for admission.       CONSULTS:  [address] [place] TO CHANGE BASE FLUIDS    PROCEDURES:  Unless otherwise noted below, none     Procedures    FINAL IMPRESSION      1. Cerebrovascular accident (CVA), unspecified mechanism ([address]          DISPOSITION/PLAN   DISPOSITION Decision To Admit 12/14/2022 09:27:41 AM      PATIENT REFERRED TO:  No follow-up provider specified.     DISCHARGE MEDICATIONS:  New Prescriptions    No medications on file          (Please note that portions of this note were completed with a voice recognition program.  Efforts were made to edit thedictations but occasionally words are mis-transcribed.)    Vianey Pineda MD (electronically signed)  Attending Emergency Physician        Leigh Ann Jones MD  12/14/22 1229

## 2022-12-14 NOTE — ED NOTES
8: 20 a.m. Dr. Wily Heath called Code Stroke     8:20 a.m.  CT notified     8:20 a.m. Announced over PA     8:21 a.m. Dr. Reshma Moore (Neurology) notified     8:22 a.m.     Stroke Coordinator notified         Murphy Peoples  12/14/22 0827

## 2022-12-14 NOTE — CARE COORDINATION
Patient Contact Information:    [address] [phone] (home)   Telephone Information:   Mobile [phone]     Above information verified? [x]   Yes  []   No      Emergency Contacts:    Extended Emergency Contact Information  Primary Emergency Contact: Igor Alvarado  Address: [facility], [address] [facility] [address] [facility] Phone: [phone]  Relation: Spouse  Secondary Emergency Contact: Kanu Vides of [address]  Phone: [phone]  Relation: Child      Have you been vaccinated for COVID-19 (SARS-CoV-2)? []   Yes  [x]   No                   If so, when? Which :         []   Pfizer-BioNTech  []   Moderna  []   Ford Cashing  []   Other:         [facility]    [facility] [id number] [place] [address] [phone]  [address] [place] [address]  Phone: [phone] Fax: [phone]          Patient Deficits:    []   Yes   [x]   No    If yes:    []   Confusion/Memory  []   Visual  []   Motor/Sensory         []   Right arm         []   Right leg         []   Left arm         []   Left leg  []   Language/Speech         []   Aphasia         []   Dysarthria         []   Swallow    NIH Stroke Scale  Interval: Baseline  Level of Consciousness (1a): Alert  LOC Questions (1b): Answers both correctly  LOC Commands (1c): Performs both tasks correctly  Best Gaze (2): Normal  Visual (3): No visual loss  Facial Palsy (4): Normal symmetrical movement  Motor Arm, Left (5a): Some effort against gravity  Motor Arm, Right (5b): No drift  Motor Leg, Left (6a): Some effort against gravity  Motor Leg, Right (6b):  No drift  Limb Ataxia (7): Absent  Sensory (8): (!) Mild to Moderate  Best Language (9): No aphasia  Dysarthria (10): Normal  Extinction and Inattention (11): No abnormality  Total: 5    [place] Coma Scale  Eye Opening: Spontaneous  Best Verbal Response: Oriented  Best Motor Response: Obeys commands  Lino Coma Scale Score: 15    Patient Deficit Notes:          12/14/22 0939   Service Assessment   Patient Orientation Alert and Oriented   Cognition Alert   History Provided By Patient   Primary Caregiver Self   Accompanied By/Relationship Spouse   Support Systems Spouse/Significant Other;Family Members;Friends/Neighbors   Patient's Healthcare Decision Maker is: Legal Next of Kin   PCP Verified by CM Yes   Last Visit to PCP Within last 3 months   Prior Functional Level Independent in ADLs/IADLs   Current Functional Level Independent in ADLs/IADLs   Can patient return to prior living arrangement Yes   Ability to make needs known: Good   Family able to assist with home care needs: Yes   Would you like for me to discuss the discharge plan with any other family members/significant others, and if so, who? Yes   Financial Resources None   Community Resources None   CM/SW Referral   (Denied Needs)   Social/Functional History   Lives With Spouse;Daughter   Type of [address] One level   Home Access Stairs to enter with rails   Bathroom Shower/Tub Tub/Shower unit   Bathroom Toilet Standard   Bathroom Equipment None   Bathroom Accessibility Accessible   Home Equipment None   Receives Help From Family   ADL Assistance Independent   Homemaking Assistance Independent   Homemaking Responsibilities Yes   Ambulation Assistance Independent   Transfer Assistance Independent   Active  Yes   Mode of Transportation Car   Occupation Full time employment   Discharge Planning   [facility] Spouse/Significant Other;Children   Current Services Prior To Admission None   Potential Assistance Needed N/A   DME Ordered?  No   Potential Assistance Purchasing Medications No   Type of Home Care Services None   Patient expects to be discharged to: House   One/Two Story Residence One story   Services At/After Discharge   Transition of Care Consult (CM Consult) N/A Services At/After Discharge None   Mode of Transport at Discharge Other (see comment)   Confirm Follow Up Transport Family

## 2022-12-14 NOTE — H&P
[facility] - History & Physical      PCP: JUAN JOSE Delgado    Date of Admission: 12/14/2022    Date of Service: 12/14/2022    Chief Complaint: left-sided numbness/weakness    History Of Present Illness: The patient is a 50 y.o. female with past medical history of hypertension, COPD, TIA, CVA who presented to [address] [place] [address] ER complaining of left-sided weakness that started at 0700 today. Patient reports weakness in her left arm and left leg that she noticed while she was getting ready for work. She also reports that she was having difficulty thinking. Patient was seen back in May and diagnosed with current TIAs. She was started on Plavix, statins, and aspirin at that time. Initial NIH stroke scale score was 5 in ER with a GCS 15. Patient's symptoms did improve while in the ER. She does have full strength of her left arm, however patient reports that her left leg still feels \"heavy\". She also reports that her numbness has subsided. Work-up in ER revealed negative head CT, negative CTA of the head and neck, negative chest x-ray, negative MRI of the brain with and without contrast, and echocardiogram shows EF 55 to 60%. Chemistry panel unremarkable. Troponin negative. CBC unremarkable. Neurology was consulted and evaluated patient while she was in the ER. IV thrombolytic therapy was discussed, however neurology felt as though the risks were greater than benefits due to her improvement in symptoms. Patient admitted to hospital medicine with neurology following.       Past Medical History:        Diagnosis Date    Allergic rhinitis 11/10/2021    Anxiety 6/4/2020    Asthma     Chronic back pain 7/29/2018    Chronic migraine without aura, intractable, without status migrainosus     Colon polyps     Depression 7/29/2018    Elevated blood pressure, situational 5/29/2018    Essential tremor     Fibromyalgia     Liver disease     Fatty Liver    Meniere disease 2017 Osteoarthritis     Sciatica of right side 2020    Tinnitus        Past Surgical History:        Procedure Laterality Date     SECTION      CHOLECYSTECTOMY, LAPAROSCOPIC  2015    Dr. Daria Bentley  ?    ? HYSTERECTOMY (CERVIX STATUS UNKNOWN)      LIVER BIOPSY  2015    Dr. Salvatore Joya Medications:  Prior to Admission medications    Medication Sig Start Date End Date Taking? Authorizing Provider   amLODIPine (NORVASC) 5 MG tablet TAKE 1 TABLET BY MOUTH NIGHTLY  85//03  JUAN JOSE Bond   busPIRone (BUSPAR) 5 MG tablet TAKE 1 TABLET BY MOUTH THREE TIMES DAILY AS NEEDED FOR ANXIETY 22   JUAN JOSE Vu   predniSONE (DELTASONE) 10 MG tablet Take 6 tabs for 2 days, 5 tabs for 2 days, 4 tabs for 2 days, 3 tabs for 2 days, 2 tabs for 2 days, 1 tab for 2 days.   Patient not taking: Reported on 2022   JUAN JOSE Langston - CNP   rosuvastatin (CRESTOR) 40 MG tablet Take 1 tablet by mouth nightly 22   JUAN JOSE Vu   amitriptyline (ELAVIL) 25 MG tablet Take 2 tablets by mouth nightly 22  JUAN JOSE Vu   aspirin 81 MG EC tablet Take 1 tablet by mouth every evening 22  JUAN JOSE Vu   clopidogrel (PLAVIX) 75 MG tablet Take 1 tablet by mouth every evening 22  JUAN JOSE Vu   desvenlafaxine succinate (PRISTIQ) 50 MG TB24 extended release tablet TAKE 1 TABLET BY MOUTH ONCE DAILY 22   JUAN JOSE Vu   fluticasone Rashid Eudarda) 50 MCG/ACT nasal spray 1 spray by Nasal route in the morning. 7/28/22 3/25/23  JUAN JOSE Vu   hydroCHLOROthiazide (HYDRODIURIL) 25 MG tablet Take 0.5 tablets by mouth in the morning. 22  JUAN JOSE Vu   pantoprazole (PROTONIX) 20 MG tablet Take 1 tablet by mouth daily 22   Padmini Lema MD   vitamin C (ASCORBIC ACID) 500 MG tablet Take 500 mg by mouth daily Historical Provider, MD   zinc 50 MG CAPS Take 50 mg by mouth daily    Historical Provider, MD   vitamin E 1000 units capsule Take 1,000 Units by mouth daily    Historical Provider, MD       Allergies:    Latex, Codeine, Morphine, Morphine and related, and Eggs or egg-derived products    Social History:    The patient currently lives at home with her . Tobacco:   reports that she has never smoked. She has never used smokeless tobacco.  Alcohol:   reports no history of alcohol use. Illicit Drugs: denies    Family History:      Problem Relation Age of Onset    Breast Cancer Mother 50    Colon Polyps Mother     Liver Cancer Mother     Heart Disease Mother     Cancer Mother     Diabetes Mother     Heart Disease Father     Cancer Father     Colon Polyps Maternal Grandmother     Breast Cancer Maternal Grandfather 46    Colon Polyps Paternal Grandfather     Liver Cancer Paternal Grandfather     Colon Cancer Neg Hx     Esophageal Cancer Neg Hx     Liver Disease Neg Hx     Rectal Cancer Neg Hx     Stomach Cancer Neg Hx              Review of Systems   Constitutional: Negative. Negative for fatigue and fever. HENT: Negative. Respiratory: Negative. Negative for cough and shortness of breath. Cardiovascular: Negative. Negative for chest pain and palpitations. Gastrointestinal: Negative. Negative for abdominal pain, diarrhea, nausea and vomiting. Genitourinary: Negative. Musculoskeletal: Negative. Neurological: Negative. Physical Examination:  /79   Pulse 66   Temp 99.5 °F (37.5 °C) (Oral)   Resp 10   Ht 5' 5\" (1.651 m)   Wt 145 lb (65.8 kg)   SpO2 94%   BMI 24.13 kg/m²     Physical Exam  Constitutional:       General: She is not in acute distress. Appearance: Normal appearance. She is normal weight. HENT:      Head: Normocephalic and atraumatic. Nose: Nose normal.      Mouth/Throat:      Mouth: Mucous membranes are moist.      Pharynx: Oropharynx is clear.    Eyes: Pupils: Pupils are equal, round, and reactive to light. Cardiovascular:      Rate and Rhythm: Normal rate and regular rhythm. Pulses: Normal pulses. Heart sounds: Normal heart sounds. No murmur heard. Pulmonary:      Effort: Pulmonary effort is normal. No respiratory distress. Breath sounds: Normal breath sounds. No wheezing. Abdominal:      General: Abdomen is flat. Bowel sounds are normal. There is no distension. Palpations: Abdomen is soft. Tenderness: There is no abdominal tenderness. Musculoskeletal:         General: No swelling, tenderness or deformity. Normal range of motion. Cervical back: Normal range of motion and neck supple. Skin:     General: Skin is warm and dry. Capillary Refill: Capillary refill takes less than 2 seconds. Neurological:      General: No focal deficit present. Mental Status: She is alert and oriented to person, place, and time. Mental status is at baseline. Motor: Weakness present. Comments: Mild weakness left leg    Psychiatric:         Mood and Affect: Mood normal.         Behavior: Behavior normal.         Thought Content:  Thought content normal.         Judgment: Judgment normal.        Diagnostic Data:  CBC:  Recent Labs     12/14/22 0819   WBC 7.2   HGB 15.0   HCT 44.3        BMP:  Recent Labs     12/14/22 0819      K 3.7   CL 99   CO2 30*   BUN 9   CREATININE 0.7   CALCIUM 9.9     Recent Labs     12/14/22 0819   AST 28   ALT 38*   BILITOT 0.3   ALKPHOS 48     Coag Panel:   Recent Labs     12/14/22 0819   INR 0.85*   PROTIME 11.5*   APTT 26.0     Cardiac Enzymes:   Recent Labs     12/14/22 0819   TROPONINI <0.01     ABGs:No results found for: PHART, PO2ART, AZJ2EBS  Urinalysis:  Lab Results   Component Value Date/Time    NITRU Negative 05/09/2022 11:52 AM    BLOODU Negative 05/09/2022 11:52 AM    SPECGRAV 1.005 05/09/2022 11:52 AM    GLUCOSEU Negative 05/09/2022 11:52 AM     Rad:    CT HEAD WO CONTRAST    Result Date: 12/14/2022  TECHNIQUE: An axial tomographic data set was acquired. The data was reviewed at multiple window and level settings. Automatic exposure control (AEC) was used for this study. CLINICAL HISTORY: Stroke symptoms COMPARISONS: Brain CT from 10/24/2018. FINDINGS: Acute intracranial hemorrhage: None. Extra-axial spaces: Unremarkable in light of the parenchymal volume. Ventricular system: Unremarkable. Basal cisterns: Normal. Midline shift: None. Vascular system: Unremarkable. Cerebral parenchyma: White matter: Normal. Parenchymal volume: The parenchymal volume is within normal limits. Cerebellum: Unremarkable. Brainstem: Within normal limits. Paranasal sinuses and mastoid air cells: The paranasal sinuses and mastoid air cells are well pneumatized. Orbits and extracranial soft tissues: Unremarkable. Osseous structures: There is no acute osseous injury. 1.No acute intracranial abnormality identified. If there is ongoing concern for acute infarct, recommend brain MRI. XR CHEST PORTABLE    Result Date: 12/14/2022  CLINICAL HISTORY: Code stroke TECHNIQUE: Single AP view of the chest FINDINGS: Lines and tubes: None. Cardiomediastinal: Normal size and configuration of the cardiomediastinal silhouette. No pneumomediastinum. Lungs and pleura: The lungs are grossly unremarkable. No pleural effusion. No pneumothorax. Musculoskeletal: No acute osseous abnormalities. Osseous degenerative changes. Other: None. No acute cardiopulmonary process identified. CTA HEAD NECK W CONTRAST    Result Date: 12/14/2022  CTA Head and Neck History: Stroke symptoms Technique: Axial helical acquisition of the head and neck with intravenous contrast was performed. Sagittal and coronal reconstructions were performed. NASCET criteria was utilized. 3D postprocessing was utilized. Dose reduction techniques were utilized.  COMPARISON: None FINDINGS: Aortic Arch: Normal three-vessel anatomic configuration of the aortic arch is demonstrated. Origins of the great vessels appear normal. Extracranial Carotid Arterial System: The RIGHT common carotid artery, internal carotid artery, and external carotid artery demonstrate no abnormality. The LEFT common carotid artery, internal carotid artery, and external carotid artery demonstrate no abnormality. Extracranial Vertebral Arterial System: The RIGHT vertebral artery demonstrates no abnormality. The LEFT vertebral artery demonstrates no abnormality. The vertebral arteries are codominant. Intracranial Anterior Circulation: The RIGHT anterior circulation including the right internal carotid artery, middle cerebral artery, and anterior cerebral artery demonstrates no abnormality. The LEFT anterior circulation including the left internal carotid artery, middle cerebral artery, and anterior cerebral artery demonstrates no abnormality. The anterior communicating artery is unremarkable. A left posterior communicating artery is identified. Vertebrobasilar Circulation: The basilar artery is unremarkable. The RIGHT posterior cerebral artery, superior cerebellar artery, and posterior inferior cerebellar artery demonstrate no abnormality. The LEFT posterior cerebral artery, superior cerebellar artery, and posterior inferior cerebellar artery demonstrate no abnormality. Other: The visualized dural sinuses and intradural venous system are unremarkable. No evidence of intracranial mass, mass effect, or abnormal enhancement. The skull base, calvaria, orbits, and overlying soft tissues are intact. The visualized soft tissues of the neck and superior mediastinum are within normal limits. Mild scarring in the lung apices. No osseous abnormality is identified. No evidence of hemodynamically significant vascular injury or aneurysm. MRI BRAIN W WO CONTRAST    Result Date: 12/14/2022  NO PRIOR REPORT AVAILABLE Exam: MRI OF THE BRAIN WITHOUT AND WITHINTRAVENOUS CONTRAST Clinical data:Stroke.  Technique: Multiplanar multi-sequence MRI of the brain without and with intravenous gadolinium. Diffusion-weighted imaging is submitted. Contrast: MultiHance. Amount: 15 mL. Prior studies: CTA of the brain and neck dated 05/17/2022 report. CT of the brain dated 05/17/2022 report. MRI of the brain report. Findings: No abnormal parenchymal signal is present. No acute intracranial abnormality, specifically, cortical infarction, hemorrhage, mass or mass effect is seen. The ventricular system is normal in size, shape, and contour. No abnormal extra-axial fluid collections are present. The marrow signal within the skull base and calvarium is intact. The paranasal sinuses and mastoid air cells are clear. Larger intracranial flow voids are intact. No abnormal leptomeningeal, parenchymal or ependymal enhancement. DWI/ADC: Negative. 1.  No significant intracranial abnormalities. Recommendation: Follow up as clinically indicated.  Dictated and Electronically Signed by Tee Chavez MD at 14-Dec-2022 02:10:06 PM               Assessment/Plan:  Principal Problem:    Weakness of left side of body   -Neurology consultation    -Recommend continuing antiplatelet therapy: Aspirin and Plavix, continue statin, allow permissive hypertension.    -Recommends PT, OT, ST    -Recommends DVT prophylaxis: Lovenox and home Plavix    -Recommends telemetry and Zio patch at discharge   -Head CT negative   -CTA head and neck with contrast negative   -MRI of the brain with and without contrast negative   - Aspirin and statin   - ECHO    - NIHSS/Neuro checks   - Nursing swallow assessment   - PT/OT/SLP   - FLP in a.m./ HgbA1c   - Allow permissive hypertension   - Monitor on telemetry      Active Problems:    Essential hypertension   -Allow permissive hypertension per neurology   -Home amlodipine 5 mg ordered but to only be given if systolic blood pressure greater than 180   -Vital signs every 4 hours       DVT Prophylaxis: Lovenox and home Plavix    Further Orders per Clinical course/attending. Signed:  Electronically signed by Chestine Barthel, APRN - CNP on 12/14/22 at 5:41 PM CST       EMR Dragon/Transcription disclaimer:   Much of this encounter note is an electronic transcription/translation of spoken language to printed text.  The electronic translation of spoken language may permit erroneous, or at times, nonsensical words or phrases to be inadvertently transcribed; although attempts have made to review the note for such errors, some may still exist.

## 2022-12-14 NOTE — CONSULTS
[facility] Neurology Consult      Patient:   Jose A Vora  MR#:    [id number]  Account Number:                   [id number]      Room:    06/06   YOB: 1970  Date of Progress Note: 12/14/2022  Time of Note                           9:38 AM  Attending Physician:  Gigi Blackwood MD  Consulting Physician:  Ap Prasad DO       CHIEF COMPLAINT:  Left sided weakness, numbness    HISTORY OF PRESENT ILLNESS:   This is a 50 y.o. female who was admitted with left-sided numbness and weakness. The patient symptoms started around 7 AM this morning. She noted numbness and weakness involving the left upper extremity which progressed to involve the left lower extremity and left side of her face. She denies significant facial drooping. There was some speech difficulty noted. Her symptoms have rapidly improved. The weakness in her upper extremity has essentially resolved. She does have mild lower extremity weakness over the left side currently. Her speech is back to baseline. There is no current facial drooping. She does have a prior stroke history with right-sided symptoms noted at that time. She has no history of carotid artery disease or atrial fibrillation. CT head was negative. CTA of the head and neck was largely nonrevealing. REVIEW OF SYSTEMS:  Constitutional - No fever or chills. HENT -  No Scalp tenderness. No tinnitus or significant hearing loss. No nose bleeding, no sore throat. Eyes - No sudden vision change or eye pain  Respiratory - No significant shortness of breath or cough  Cardiovascular - No chest pain. No palpitations or significant leg swelling  Gastrointestinal - No abdominal swelling or pain. Genitourinary - No difficulty urinating, dysuria  Musculoskeletal - No back pain or myalgia. Skin - No color change or rash  Neurologic - No seizures. Hematologic - No easy bruising or spontaneous bleeding. Psychiatric - No anxiety.      PAST MEDICAL HISTORY:      Diagnosis Date    Allergic rhinitis 11/10/2021    Anxiety 2020    Asthma     Chronic back pain 2018    Chronic migraine without aura, intractable, without status migrainosus     Colon polyps     Depression 2018    Elevated blood pressure, situational 2018    Essential tremor     Fibromyalgia     Liver disease     Fatty Liver    Meniere disease 2017    Osteoarthritis     Sciatica of right side 2020    Tinnitus        PAST SURGICAL HISTORY:      Procedure Laterality Date     SECTION      CHOLECYSTECTOMY, LAPAROSCOPIC  2015    Dr. Natalia Aragon  ?    ? HYSTERECTOMY (CERVIX STATUS UNKNOWN)      LIVER BIOPSY  2015    Dr. Jennifer Howell HISTORY:   TOBACCO:   reports that she has never smoked. She has never used smokeless tobacco.  ETOH:   reports no history of alcohol use.   DRUG:    Social History     Substance and Sexual Activity   Drug Use No       FAMILY HISTORY:       Problem Relation Age of Onset    Breast Cancer Mother 50    Colon Polyps Mother     Liver Cancer Mother     Heart Disease Mother     Cancer Mother     Diabetes Mother     Heart Disease Father     Cancer Father     Colon Polyps Maternal Grandmother     Breast Cancer Maternal Grandfather 46    Colon Polyps Paternal Grandfather     Liver Cancer Paternal Grandfather     Colon Cancer Neg Hx     Esophageal Cancer Neg Hx     Liver Disease Neg Hx     Rectal Cancer Neg Hx     Stomach Cancer Neg Hx        MEDICATIONS:      Current Facility-Administered Medications:     0.9 % sodium chloride bolus, 1,000 mL, IntraVENous, Once, Matti Basilio MD, Last Rate: 1,000 mL/hr at 22 0844, 1,000 mL at 22 0844    bupivacaine (MARCAINE) 0.5 % injection 15 mg, 3 mL, Intra-artICUlar, Once, JUAN JOSE Sen    lidocaine 1 % injection 3 mL, 3 mL, IntraDERmal, Once, JUAN JOSE Sen    Current Outpatient Medications:     amLODIPine (NORVASC) 5 MG tablet, TAKE 1 TABLET BY MOUTH NIGHTLY, Disp: 30 tablet, Rfl: 0    busPIRone (BUSPAR) 5 MG tablet, TAKE 1 TABLET BY MOUTH THREE TIMES DAILY AS NEEDED FOR ANXIETY, Disp: 90 tablet, Rfl: 1    predniSONE (DELTASONE) 10 MG tablet, Take 6 tabs for 2 days, 5 tabs for 2 days, 4 tabs for 2 days, 3 tabs for 2 days, 2 tabs for 2 days, 1 tab for 2 days.  (Patient not taking: Reported on 12/14/2022), Disp: 42 tablet, Rfl: 0    rosuvastatin (CRESTOR) 40 MG tablet, Take 1 tablet by mouth nightly, Disp: 30 tablet, Rfl: 3    amitriptyline (ELAVIL) 25 MG tablet, Take 2 tablets by mouth nightly, Disp: 60 tablet, Rfl: 2    aspirin 81 MG EC tablet, Take 1 tablet by mouth every evening, Disp: 30 tablet, Rfl: 5    clopidogrel (PLAVIX) 75 MG tablet, Take 1 tablet by mouth every evening, Disp: 30 tablet, Rfl: 5    desvenlafaxine succinate (PRISTIQ) 50 MG TB24 extended release tablet, TAKE 1 TABLET BY MOUTH ONCE DAILY, Disp: 30 tablet, Rfl: 2    fluticasone (FLONASE) 50 MCG/ACT nasal spray, 1 spray by Nasal route in the morning., Disp: 1 each, Rfl: 5    hydroCHLOROthiazide (HYDRODIURIL) 25 MG tablet, Take 0.5 tablets by mouth in the morning., Disp: 30 tablet, Rfl: 5    pantoprazole (PROTONIX) 20 MG tablet, Take 1 tablet by mouth daily, Disp: 30 tablet, Rfl: 3    vitamin C (ASCORBIC ACID) 500 MG tablet, Take 500 mg by mouth daily, Disp: , Rfl:     zinc 50 MG CAPS, Take 50 mg by mouth daily, Disp: , Rfl:     vitamin E 1000 units capsule, Take 1,000 Units by mouth daily, Disp: , Rfl:     ALLERGIES:    Latex, Codeine, Morphine, Morphine and related, and Eggs or egg-derived products    PHYSICAL EXAM:    Constitutional -   BP (!) 109/90   Pulse 72   Temp 99.5 °F (37.5 °C) (Oral)   Resp 17   Ht 5' 5\" (1.651 m)   Wt 145 lb (65.8 kg)   SpO2 94%   BMI 24.13 kg/m²   General appearance: No acute distress   EYES -   Conjunctiva normal  Pupillary exam as below, see CN exam in the neurologic exam  ENT-    No scars, masses, or lesions over external nose or ears  Oropharynx without erythema, palate midline  Cardiovascular -   No clubbing, cyanosis, or edema   Pulmonary-   Good expansion, normal effort without use of accessory muscles  Musculoskeletal -   No significant wasting of muscles noted  Gait as below, see gait exam in the neurologic exam  Muscle strength, tone, stability as below see the motor exam in the neurologic exam.   No bony deformities  Skin -   Warm, dry, and intact to inspection and palpation. No rash, erythema, or pallor  Psychiatric -   Mood, affect, and behavior appear normal    Memory as below see mental status examination in the neurologic exam      NEUROLOGICAL EXAM    Mental status   [x] Awake, alert, oriented   [x] Affect attention and concentration appear appropriate  [x] Recent and remote memory appears unremarkable  [x] Speech normal without dysarthria or aphasia, comprehension and repetition intact.    COMMENTS:   Cranial Nerves [x] No VF deficit to confrontation  [x] PERRLA, EOMI, no nystagmus, conjugate eye movements, no ptosis  [x] Face symmetric  [x] Facial sensation intact  [x] Tongue midline no atrophy or fasciculations present  [x] Palate midline, hearing to finger rub normal  [x] Shoulder shrug and SCM testing normal  COMMENTS:   Motor   [] 5/5 strength x 4 extremities  [x] Normal bulk and tone  [x] No tremor present  [x] No rigidity or bradykinesia noted  COMMENTS:4/5 LLE   Sensory  [x] Sensation intact to light touch, pin prick, vibration, and proprioception BLE  [] Sensation intact to light touch, pin prick, vibration, and proprioception BUE  COMMENTS:   Coordination [x] FTN normal bilaterally   [] HTS normal bilaterally  [] RONALDO normal.   COMMENTS:   Reflexes  [x] Symmetric and non-pathological  [x] Toes downgoing bilaterally  [x] No clonus present  COMMENTS:   Gait                  [] Normal steady gait    [] Ataxic    [] Spastic     [] Magnetic     [] Shuffling  [x] Not assessed  COMMENTS:     NIHSS score:  3  LABS/IMAGING:    As below and per HPI    CT HEAD WO CONTRAST    Result Date: 12/14/2022  TECHNIQUE: An axial tomographic data set was acquired. The data was reviewed at multiple window and level settings. Automatic exposure control (AEC) was used for this study. CLINICAL HISTORY: Stroke symptoms COMPARISONS: Brain CT from 10/24/2018. FINDINGS: Acute intracranial hemorrhage: None. Extra-axial spaces: Unremarkable in light of the parenchymal volume. Ventricular system: Unremarkable. Basal cisterns: Normal. Midline shift: None. Vascular system: Unremarkable. Cerebral parenchyma: White matter: Normal. Parenchymal volume: The parenchymal volume is within normal limits. Cerebellum: Unremarkable. Brainstem: Within normal limits. Paranasal sinuses and mastoid air cells: The paranasal sinuses and mastoid air cells are well pneumatized. Orbits and extracranial soft tissues: Unremarkable. Osseous structures: There is no acute osseous injury. 1.No acute intracranial abnormality identified. If there is ongoing concern for acute infarct, recommend brain MRI. XR CHEST PORTABLE    Result Date: 12/14/2022  CLINICAL HISTORY: Code stroke TECHNIQUE: Single AP view of the chest FINDINGS: Lines and tubes: None. Cardiomediastinal: Normal size and configuration of the cardiomediastinal silhouette. No pneumomediastinum. Lungs and pleura: The lungs are grossly unremarkable. No pleural effusion. No pneumothorax. Musculoskeletal: No acute osseous abnormalities. Osseous degenerative changes. Other: None. No acute cardiopulmonary process identified. CTA HEAD NECK W CONTRAST    Result Date: 12/14/2022  CTA Head and Neck History: Stroke symptoms Technique: Axial helical acquisition of the head and neck with intravenous contrast was performed. Sagittal and coronal reconstructions were performed. NASCET criteria was utilized. 3D postprocessing was utilized. Dose reduction techniques were utilized.  COMPARISON: None FINDINGS: Aortic Arch: Normal three-vessel anatomic configuration of the aortic arch is demonstrated. Origins of the great vessels appear normal. Extracranial Carotid Arterial System: The RIGHT common carotid artery, internal carotid artery, and external carotid artery demonstrate no abnormality. The LEFT common carotid artery, internal carotid artery, and external carotid artery demonstrate no abnormality. Extracranial Vertebral Arterial System: The RIGHT vertebral artery demonstrates no abnormality. The LEFT vertebral artery demonstrates no abnormality. The vertebral arteries are codominant. Intracranial Anterior Circulation: The RIGHT anterior circulation including the right internal carotid artery, middle cerebral artery, and anterior cerebral artery demonstrates no abnormality. The LEFT anterior circulation including the left internal carotid artery, middle cerebral artery, and anterior cerebral artery demonstrates no abnormality. The anterior communicating artery is unremarkable. A left posterior communicating artery is identified. Vertebrobasilar Circulation: The basilar artery is unremarkable. The RIGHT posterior cerebral artery, superior cerebellar artery, and posterior inferior cerebellar artery demonstrate no abnormality. The LEFT posterior cerebral artery, superior cerebellar artery, and posterior inferior cerebellar artery demonstrate no abnormality. Other: The visualized dural sinuses and intradural venous system are unremarkable. No evidence of intracranial mass, mass effect, or abnormal enhancement. The skull base, calvaria, orbits, and overlying soft tissues are intact. The visualized soft tissues of the neck and superior mediastinum are within normal limits. Mild scarring in the lung apices. No osseous abnormality is identified. No evidence of hemodynamically significant vascular injury or aneurysm.       Recent Labs     12/14/22  0819   WBC 7.2   HGB 15.0        Recent Labs     12/14/22  0819      K 3.7   CL 99   CO2 30*   BUN 9   CREATININE 0.7   GLUCOSE 125*     Recent Labs     12/14/22  0819   AST 28   ALT 38*   BILITOT 0.3   ALKPHOS 48     Recent Labs     12/14/22  0819   INR 0.85*         ASSESSMENT:  50 y.o. seen in the ED with left-sided weakness and numbness. Symptoms appear to be rapidly resolving. Prior stroke history noted. CT head, CTA head and neck largely negative. IV thrombolytic therapy was discussed with the patient. After discussing the risk and benefits the patient declined treatment. She understands that symptoms are currently improving but there is risk of possible worsening or stroke evolution and has declined treatment. She will be admitted for further stroke work-up. PLAN:   MRI Brain   ECHO, tele, zio at discharge   Continue antiplatelet therapy, currently on ASA and plavix, continue statin, permissive hypertension. PT, OT, ST   DVT proph        Please feel free to call with any questions. [phone] (cell phone).     Colleen Molina DO  Board Certified Neurology

## 2022-12-15 VITALS
OXYGEN SATURATION: 94 % | HEIGHT: 65 IN | HEART RATE: 71 BPM | TEMPERATURE: 97.7 F | DIASTOLIC BLOOD PRESSURE: 78 MMHG | BODY MASS INDEX: 24.49 KG/M2 | RESPIRATION RATE: 20 BRPM | SYSTOLIC BLOOD PRESSURE: 111 MMHG | WEIGHT: 147 LBS

## 2022-12-15 LAB
ALBUMIN SERPL-MCNC: 3.6 G/DL (ref 3.5–5.2)
ALP BLD-CCNC: 36 U/L (ref 35–104)
ALT SERPL-CCNC: 28 U/L (ref 5–33)
ANION GAP SERPL CALCULATED.3IONS-SCNC: 11 MMOL/L (ref 7–19)
AST SERPL-CCNC: 19 U/L (ref 5–32)
BILIRUB SERPL-MCNC: 0.3 MG/DL (ref 0.2–1.2)
BUN BLDV-MCNC: 11 MG/DL (ref 6–20)
CALCIUM SERPL-MCNC: 8.8 MG/DL (ref 8.6–10)
CHLORIDE BLD-SCNC: 106 MMOL/L (ref 98–111)
CHOLESTEROL, TOTAL: 113 MG/DL (ref 160–199)
CO2: 24 MMOL/L (ref 22–29)
CREAT SERPL-MCNC: 0.6 MG/DL (ref 0.5–0.9)
GFR SERPL CREATININE-BSD FRML MDRD: >60 ML/MIN/{1.73_M2}
GLUCOSE BLD-MCNC: 122 MG/DL (ref 74–109)
HBA1C MFR BLD: 6.1 % (ref 4–6)
HCT VFR BLD CALC: 39 % (ref 37–47)
HDLC SERPL-MCNC: 33 MG/DL (ref 65–121)
HEMOGLOBIN: 13.3 G/DL (ref 12–16)
LDL CHOLESTEROL CALCULATED: 47 MG/DL
MCH RBC QN AUTO: 30.9 PG (ref 27–31)
MCHC RBC AUTO-ENTMCNC: 34.1 G/DL (ref 33–37)
MCV RBC AUTO: 90.7 FL (ref 81–99)
PDW BLD-RTO: 12.2 % (ref 11.5–14.5)
PLATELET # BLD: 284 K/UL (ref 130–400)
PMV BLD AUTO: 11 FL (ref 9.4–12.3)
POTASSIUM SERPL-SCNC: 3.8 MMOL/L (ref 3.5–5)
RBC # BLD: 4.3 M/UL (ref 4.2–5.4)
SODIUM BLD-SCNC: 141 MMOL/L (ref 136–145)
TOTAL PROTEIN: 5.9 G/DL (ref 6.6–8.7)
TRIGL SERPL-MCNC: 167 MG/DL (ref 0–149)
WBC # BLD: 7.3 K/UL (ref 4.8–10.8)

## 2022-12-15 PROCEDURE — 99232 SBSQ HOSP IP/OBS MODERATE 35: CPT | Performed by: PSYCHIATRY & NEUROLOGY

## 2022-12-15 PROCEDURE — 85027 COMPLETE CBC AUTOMATED: CPT

## 2022-12-15 PROCEDURE — 97165 OT EVAL LOW COMPLEX 30 MIN: CPT

## 2022-12-15 PROCEDURE — 80053 COMPREHEN METABOLIC PANEL: CPT

## 2022-12-15 PROCEDURE — 80061 LIPID PANEL: CPT

## 2022-12-15 PROCEDURE — 83036 HEMOGLOBIN GLYCOSYLATED A1C: CPT

## 2022-12-15 PROCEDURE — 36415 COLL VENOUS BLD VENIPUNCTURE: CPT

## 2022-12-15 PROCEDURE — 6370000000 HC RX 637 (ALT 250 FOR IP)

## 2022-12-15 PROCEDURE — 93246 EXT ECG>7D<15D RECORDING: CPT

## 2022-12-15 PROCEDURE — 6360000002 HC RX W HCPCS

## 2022-12-15 PROCEDURE — 94760 N-INVAS EAR/PLS OXIMETRY 1: CPT

## 2022-12-15 RX ADMIN — PANTOPRAZOLE SODIUM 20 MG: 20 TABLET, DELAYED RELEASE ORAL at 09:58

## 2022-12-15 RX ADMIN — OXYCODONE HYDROCHLORIDE AND ACETAMINOPHEN 500 MG: 500 TABLET ORAL at 09:58

## 2022-12-15 RX ADMIN — ZINC SULFATE 220 MG (50 MG) CAPSULE 50 MG: CAPSULE at 09:58

## 2022-12-15 RX ADMIN — DESVENLAFAXINE 50 MG: 50 TABLET, EXTENDED RELEASE ORAL at 09:58

## 2022-12-15 RX ADMIN — ENOXAPARIN SODIUM 40 MG: 100 INJECTION SUBCUTANEOUS at 09:58

## 2022-12-15 RX ADMIN — ASPIRIN 81 MG: 81 TABLET, COATED ORAL at 09:58

## 2022-12-15 NOTE — TELEPHONE ENCOUNTER
Carlotta Sylvester called to request a refill on her medication.       Last office visit : Visit date not found   Next office visit : Visit date not found     Requested Prescriptions     Pending Prescriptions Disp Refills    amitriptyline (ELAVIL) 25 MG tablet 60 tablet 2     Sig: Take 2 tablets by mouth nightly    amLODIPine (NORVASC) 5 MG tablet 30 tablet 3     Sig: Take 1 tablet by mouth nightly    rosuvastatin (CRESTOR) 40 MG tablet 30 tablet 3     Sig: Take 1 tablet by mouth nightly            Rajwinder Montanez LPN

## 2022-12-15 NOTE — PROGRESS NOTES
4 Eyes Skin Assessment    Destini Chavez is being assessed upon: Admission    I agree that I, Aracelis Vega RN, along with Joshua Luis LPN (either 2 RN's or 1 LPN and 1 RN) have performed a thorough Head to Toe Skin Assessment on the patient. ALL assessment sites listed below have been assessed. Areas assessed by both nurses:     [x]   Head, Face, and Ears   [x]   Shoulders, Back, and Chest  [x]   Arms, Elbows, and Hands   [x]   Coccyx, Sacrum, and Ischium  [x]   Legs, Feet, and Heels    Does the Patient have Skin Breakdown? No    Adrian Prevention initiated: Yes  Wound Care Orders initiated: No    WO nurse consulted for Pressure Injury (Stage 3,4, Unstageable, DTI, NWPT, and Complex wounds) and New or Established Ostomies: No        Primary Nurse eSignature:  Aracelis Vega RN on 12/15/2022 at 4:52 AM      Co-Signer eSignature: Electronically signed by Felicita Chambers LPN on 04/59/60 at 1:46 AM CST

## 2022-12-15 NOTE — PROGRESS NOTES
[facility] Neurology Progress Note      Patient:   Mikey Abdi  MR#:    [id number]   Room:    [facility]-31   YOB: 1970  Date of Progress Note: 12/15/2022  Time of Note                           10:50 AM  Consulting Physician:  Sean Issa DO  Attending Physician:  Deondre Chaudhry MD      INTERVAL HISTORY:  Symptoms improved, no new focal complaints. REVIEW OF SYSTEMS:  Constitutional: No fevers No chills  Neck:No stiffness  Respiratory: No shortness of breath  Cardiovascular: No chest pain No palpitations  Gastrointestinal: No abdominal pain    Genitourinary: No Dysuria  Neurological: No headache, no confusion    PHYSICAL EXAM:    Constitutional -   /78   Pulse 71   Temp 97.7 °F (36.5 °C) (Temporal)   Resp 20   Ht 5' 5\" (1.651 m)   Wt 147 lb (66.7 kg)   SpO2 93%   BMI 24.46 kg/m²   General appearance: No acute distress   EYES -   Conjunctiva normal  Pupillary exam as below, see CN exam in the neurologic exam  ENT-    No scars, masses, or lesions over external nose or ears  Oropharynx without erythema, palate midline  Cardiovascular -   No clubbing, cyanosis, or edema   Pulmonary-   Good expansion, normal effort without use of accessory muscles  Musculoskeletal -   No significant wasting of muscles noted  Gait as below, see gait exam in the neurologic exam  Muscle strength, tone, stability as below see the motor exam in the neurologic exam.   No bony deformities  Skin -   Warm, dry, and intact to inspection and palpation. No rash, erythema, or pallor  Psychiatric -   Mood, affect, and behavior appear normal    Memory as below see mental status examination in the neurologic exam        NEUROLOGICAL EXAM     Mental status    [x] Awake, alert, oriented   [x] Affect attention and concentration appear appropriate  [x] Recent and remote memory appears unremarkable  [x] Speech normal without dysarthria or aphasia, comprehension and repetition intact.    COMMENTS:   Cranial Nerves [x] No VF deficit to confrontation  [x] PERRLA, EOMI, no nystagmus, conjugate eye movements, no ptosis  [x] Face symmetric  [x] Facial sensation intact  [x] Tongue midline no atrophy or fasciculations present  [x] Palate midline, hearing to finger rub normal  [x] Shoulder shrug and SCM testing normal  COMMENTS:   Motor   [] 5/5 strength x 4 extremities  [x] Normal bulk and tone  [x] No tremor present  [x] No rigidity or bradykinesia noted  COMMENTS:4+/5 LLE   Sensory  [x] Sensation intact to light touch, pin prick, vibration, and proprioception BLE  [] Sensation intact to light touch, pin prick, vibration, and proprioception BUE  COMMENTS:   Coordination [x] FTN normal bilaterally   [] HTS normal bilaterally  [] RONALDO normal.   COMMENTS:   Reflexes  [x] Symmetric and non-pathological  [x] Toes downgoing bilaterally  [x] No clonus present  COMMENTS:   Gait                  [] Normal steady gait    [] Ataxic    [] Spastic     [] Magnetic     [] Shuffling  [x] Not assessed  COMMENTS:        LABS/IMAGING:    CT HEAD WO CONTRAST    Result Date: 12/14/2022  TECHNIQUE: An axial tomographic data set was acquired. The data was reviewed at multiple window and level settings. Automatic exposure control (AEC) was used for this study. CLINICAL HISTORY: Stroke symptoms COMPARISONS: Brain CT from 10/24/2018. FINDINGS: Acute intracranial hemorrhage: None. Extra-axial spaces: Unremarkable in light of the parenchymal volume. Ventricular system: Unremarkable. Basal cisterns: Normal. Midline shift: None. Vascular system: Unremarkable. Cerebral parenchyma: White matter: Normal. Parenchymal volume: The parenchymal volume is within normal limits. Cerebellum: Unremarkable. Brainstem: Within normal limits. Paranasal sinuses and mastoid air cells: The paranasal sinuses and mastoid air cells are well pneumatized. Orbits and extracranial soft tissues: Unremarkable. Osseous structures: There is no acute osseous injury.     1.No acute intracranial abnormality identified. If there is ongoing concern for acute infarct, recommend brain MRI. XR CHEST PORTABLE    Result Date: 12/14/2022  CLINICAL HISTORY: Code stroke TECHNIQUE: Single AP view of the chest FINDINGS: Lines and tubes: None. Cardiomediastinal: Normal size and configuration of the cardiomediastinal silhouette. No pneumomediastinum. Lungs and pleura: The lungs are grossly unremarkable. No pleural effusion. No pneumothorax. Musculoskeletal: No acute osseous abnormalities. Osseous degenerative changes. Other: None. No acute cardiopulmonary process identified. CTA HEAD NECK W CONTRAST    Result Date: 12/14/2022  CTA Head and Neck History: Stroke symptoms Technique: Axial helical acquisition of the head and neck with intravenous contrast was performed. Sagittal and coronal reconstructions were performed. NASCET criteria was utilized. 3D postprocessing was utilized. Dose reduction techniques were utilized. COMPARISON: None FINDINGS: Aortic Arch: Normal three-vessel anatomic configuration of the aortic arch is demonstrated. Origins of the great vessels appear normal. Extracranial Carotid Arterial System: The RIGHT common carotid artery, internal carotid artery, and external carotid artery demonstrate no abnormality. The LEFT common carotid artery, internal carotid artery, and external carotid artery demonstrate no abnormality. Extracranial Vertebral Arterial System: The RIGHT vertebral artery demonstrates no abnormality. The LEFT vertebral artery demonstrates no abnormality. The vertebral arteries are codominant. Intracranial Anterior Circulation: The RIGHT anterior circulation including the right internal carotid artery, middle cerebral artery, and anterior cerebral artery demonstrates no abnormality. The LEFT anterior circulation including the left internal carotid artery, middle cerebral artery, and anterior cerebral artery demonstrates no abnormality. The anterior communicating artery is unremarkable.  A left posterior communicating artery is identified. Vertebrobasilar Circulation: The basilar artery is unremarkable. The RIGHT posterior cerebral artery, superior cerebellar artery, and posterior inferior cerebellar artery demonstrate no abnormality. The LEFT posterior cerebral artery, superior cerebellar artery, and posterior inferior cerebellar artery demonstrate no abnormality. Other: The visualized dural sinuses and intradural venous system are unremarkable. No evidence of intracranial mass, mass effect, or abnormal enhancement. The skull base, calvaria, orbits, and overlying soft tissues are intact. The visualized soft tissues of the neck and superior mediastinum are within normal limits. Mild scarring in the lung apices. No osseous abnormality is identified. No evidence of hemodynamically significant vascular injury or aneurysm. MRI BRAIN W WO CONTRAST    Result Date: 12/14/2022  NO PRIOR REPORT AVAILABLE Exam: MRI OF THE BRAIN WITHOUT AND WITHINTRAVENOUS CONTRAST Clinical data:Stroke. Technique: Multiplanar multi-sequence MRI of the brain without and with intravenous gadolinium. Diffusion-weighted imaging is submitted. Contrast: MultiHance. Amount: 15 mL. Prior studies: CTA of the brain and neck dated 05/17/2022 report. CT of the brain dated 05/17/2022 report. MRI of the brain report. Findings: No abnormal parenchymal signal is present. No acute intracranial abnormality, specifically, cortical infarction, hemorrhage, mass or mass effect is seen. The ventricular system is normal in size, shape, and contour. No abnormal extra-axial fluid collections are present. The marrow signal within the skull base and calvarium is intact. The paranasal sinuses and mastoid air cells are clear. Larger intracranial flow voids are intact. No abnormal leptomeningeal, parenchymal or ependymal enhancement. DWI/ADC: Negative. 1.  No significant intracranial abnormalities. Recommendation: Follow up as clinically indicated. Dictated and Electronically Signed by Haroon Haskins MD at 14-Dec-2022 02:10:06 PM               Lab Results   Component Value Date    WBC 7.3 12/15/2022    HGB 13.3 12/15/2022    HCT 39.0 12/15/2022    MCV 90.7 12/15/2022     12/15/2022     Lab Results   Component Value Date     12/15/2022    K 3.8 12/15/2022     12/15/2022    CO2 24 12/15/2022    BUN 11 12/15/2022    CREATININE 0.6 12/15/2022    GLUCOSE 122 (H) 12/15/2022    CALCIUM 8.8 12/15/2022    PROT 5.9 (L) 12/15/2022    LABALBU 3.6 12/15/2022    BILITOT 0.3 12/15/2022    ALKPHOS 36 12/15/2022    AST 19 12/15/2022    ALT 28 12/15/2022    LABGLOM >60 12/15/2022    GFRAA >59 05/17/2022    GLOB 2.7 07/05/2016     Lab Results   Component Value Date    INR 0.85 (L) 12/14/2022    INR 1.02 05/17/2022    INR 0.94 05/09/2022    PROTIME 11.5 (L) 12/14/2022    PROTIME 13.3 05/17/2022    PROTIME 12.5 05/09/2022       RECORD REVIEW:   Previous medical records, medications were reviewed at today's visit. Nursing/physician notes, imaging, labs and vitals reviewed. PT,OT and/or speech notes reviewed    ASSESSMENT:  50 y.o. admitted with left-sided weakness and numbness. Symptoms much improved overall. Prior stroke history noted. MRI brain negative. CT head, CTA head and neck largely negative. ECHO negative from stroke standpoint. PLAN:   Follow donavon dean at discharge   Continue antiplatelet therapy, currently on ASA and plavix, continue statin, risk factor maximization. PT, OT, ST   DVT proph    Consider MRI L-spine, NCS/EMG to evaluate LLE weakness further as outpatient if does not improve completely to ensure no other source for weakness. [id number] [address] Pablo for discharge from my standpoint once cleared medically. Follow up with me in 2 weeks. Please feel free to call with any questions. [phone] (cell phone).       Yadira Pitts DO  Board Certified Neurology

## 2022-12-15 NOTE — PROGRESS NOTES
Physician Progress Note      Александр Henry  CSN #:                  [id number]  :                       1970  ADMIT DATE:       2022 8:11 AM  DISCH DATE:  RESPONDING  PROVIDER #:        Angel Quick TEXT:    Patient admitted with left arm and leg numbness and weakness. Documentation   reflects CVA in the ER provider notes and the neuro consult. If possible,   please document in the progress notes and discharge summary if Stroke was: The medical record reflects the following:  Risk Factors: HTN, TIA and previous CVA  Clinical Indicators: NIH Stroke Scale: 5, with GCS: 15, reported weakness in   left arm and left leg, with \"difficulty thinking\", symptoms did improve in the   ER, with full strength of her left arm per the H&P  Treatment: MRI, CTA Brain,    Thank you in advance,    Radha Cruz RN-BSN, [facility]  Clinical   [email or web address]  [place], [place]  Options provided:  -- CVA confirmed after study  -- CVA treated and resolved  -- CVA ruled out after study  -- Other - I will add my own diagnosis  -- Disagree - Not applicable / Not valid  -- Disagree - Clinically unable to determine / Unknown  -- Refer to Clinical Documentation Reviewer    PROVIDER RESPONSE TEXT:    CVA ruled out after study.     Query created by: José Miguel Goodwin on 12/15/2022 9:47 AM      Electronically signed by:  Romulo Jordan 12/15/2022 1:12 PM

## 2022-12-15 NOTE — ED NOTES
Notified NP with hospitalist group as pt is stating that she is feeling much better and is wanting to know if she can go home. NP Erangriffinkatarina Celestino states that she cannot discharge pt home as she is not the person that admitted her. States the pt would have to sign out against medical advice. Explained this to pt.   Pt states she will stay the night in the hospital.     Edwin Nazario RN  12/14/22 2180

## 2022-12-15 NOTE — PROGRESS NOTES
Occupational Therapy Initial Assessment  Date: 12/15/2022   Patient Name: Melissa King  MRN: [id number]     : 1970    Date of Service: 12/15/2022    Discharge Recommendations:  Home with assist PRN  OT Equipment Recommendations  Equipment Needed: No    Assessment   Assessment: OT evaluation completed. Pt does not display any deficits that would warrant further OT services in this setting. OT does not anticipate any environmental barriers to D/C home once medically cleared if PRN assist is available for safety. Pt currently denies need for OT services and reports ambulating in room/performing ADLs independently. No Skilled OT: Safe to return home;Patient refusal;At baseline function  REQUIRES OT FOLLOW-UP: No  Activity Tolerance  Activity Tolerance: Patient Tolerated treatment well              Patient Diagnosis(es): The encounter diagnosis was Cerebrovascular accident (CVA), unspecified mechanism ([facility] [address] Past Medical History:   Past Medical History:   Diagnosis Date    Allergic rhinitis 11/10/2021    Anxiety 2020    Asthma     Chronic back pain 2018    Chronic migraine without aura, intractable, without status migrainosus     Colon polyps     Depression 2018    Elevated blood pressure, situational 2018    Essential tremor     Fibromyalgia     Liver disease     Fatty Liver    Meniere disease 2017    Osteoarthritis     Sciatica of right side 2020    Tinnitus         Past Surgical History:   Past Surgical History:   Procedure Laterality Date     SECTION      CHOLECYSTECTOMY, LAPAROSCOPIC  2015    Dr. Sanna Hankins  ?    ?     HYSTERECTOMY (CERVIX STATUS UNKNOWN)      LIVER BIOPSY  2015    Dr. Eliza Nam              Restrictions  Restrictions/Precautions  Restrictions/Precautions:  Blessing Bry Fall Score 35; has been up in room independently (per pt))  Required Braces or Orthoses?: No    Subjective      Pre Treatment Pain Screening  Pain at present: 0  Scale Used: Numeric Score  Intervention List: Patient able to continue with treatment  Vital Signs  Temp: 97.7 °F (36.5 °C)  Temp Source: Temporal  Heart Rate: 71  Heart Rate Source: Monitor  Resp: 20  BP: 111/78  MAP (Calculated): 89  MAP (mmHg): 89  BP Location: Left upper arm  Oxygen Therapy  SpO2: 93 %  O2 Device: None (Room air)    Social/Functional History  Social/Functional History  Lives With: Spouse, Daughter  Type of Home: House  Home Layout: One level  Home Access: Stairs to enter with rails  Bathroom Shower/Tub: Tub/Shower unit  Bathroom Toilet: Standard  Bathroom Equipment: None  Bathroom Accessibility: Accessible  Home Equipment: None  Receives Help From: Family  ADL Assistance: Independent  Homemaking Assistance: Independent  Homemaking Responsibilities: Yes  Ambulation Assistance: Independent  Transfer Assistance: Independent  Active : Yes  Mode of Transportation: Car  Occupation: Full time employment     Objective   Hearing: Within functional limits             ADL  Feeding: Independent  Grooming: Independent  UE Bathing: Independent  LE Bathing: Independent  UE Dressing: Independent  LE Dressing: Independent  Toileting: Independent        Bed mobility  Supine to Sit: Independent  Sit to Supine: Independent  Transfers  Sit to stand: Independent  Stand to sit: Independent     Cognition  Overall Cognitive Status: WFL               Gross Assessment  AROM: Within functional limits  Strength: Within functional limits (RUE = LUE (apporximately); pt does report still feeling weak in LLE (but still having function). )  Coordination: Within functional limits  Sensation: Intact                    Plan   Occupational Therapy Plan  Additional Comments: EVAL ONLY  Goals  Short Term Goals  Short Term Goal 1: EVAL ONLY  Long Term Goals  Long Term Goal 1: EVAL ONLY           Mikki Arteaga OTR/L  Electronically signed by Leslie Flores OTR/L on 12/15/2022 at 9:47 AM.

## 2022-12-15 NOTE — DISCHARGE SUMMARY
[id number] [facility]    Discharge Summary      Ulisses Burgess  :  1970  MRN:  [id number]    Admit date:  2022  Discharge date:   12/15/2022    Discharging Physician: Dr. Amairani Sterling    Advance Directive: Full Code    Consults: neurology    Primary Care Physician:  Kathy Ramirez MD    Discharge Diagnoses:  Principal Problem:    Weakness of left side of body  Active Problems:    Essential hypertension    Vitamin D deficiency    COPD (chronic obstructive pulmonary disease) with chronic bronchitis ([address]  Resolved Problems:    * No resolved hospital problems. *      Portions of this note have been copied forward, however, changed to reflect the most current clinical status of this patient. Hospital Course: The patient is a 50 y.o. female with past medical history of hypertension, COPD, TIA, CVA who presented to [address] [place] [address] ER complaining of left-sided weakness that started at 0700 today. Patient reports weakness in her left arm and left leg that she noticed while she was getting ready for work. She also reports that she was having difficulty thinking. Patient was seen back in May and diagnosed with current TIAs. She was started on Plavix, statins, and aspirin at that time. Initial NIH stroke scale score was 5 in ER with a GCS 15. Patient's symptoms did improve while in the ER. She does have full strength of her left arm, however patient reports that her left leg still feels \"heavy\". She also reports that her numbness has subsided. Work-up in ER revealed negative head CT, negative CTA of the head and neck, negative chest x-ray, negative MRI of the brain with and without contrast, and echocardiogram shows EF 55 to 60%. Chemistry panel unremarkable. Troponin negative. CBC unremarkable. Neurology was consulted and evaluated patient while she was in the ER. IV thrombolytic therapy was discussed, however neurology felt as though the risks were greater than benefits due to her improvement in symptoms. Patient admitted to hospital medicine with neurology following. 12/15/2022  Patient denies any symptoms today. Reports that her left-sided weakness has improved as well as her numbness. Neurology did see the patient this morning and report that she is okay for discharge. He has the following recommendations:    Plan:   Follow tele, donavon at discharge   Continue antiplatelet therapy, currently on ASA and plavix, continue statin, risk factor maximization. PT, OT, ST   DVT proph    Consider MRI L-spine, NCS/EMG to evaluate LLE weakness further as outpatient if does not improve completely to ensure no other source for weakness. [id number] Olamide Pablo for discharge from my standpoint once cleared medically. Follow up with me in 2 weeks. ZIO ordered and to be placed prior to discharge. Patient is currently in stable condition to be discharged this afternoon. Significant Diagnostic Studies:   CT HEAD WO CONTRAST    Result Date: 12/14/2022  TECHNIQUE: An axial tomographic data set was acquired. The data was reviewed at multiple window and level settings. Automatic exposure control (AEC) was used for this study. CLINICAL HISTORY: Stroke symptoms COMPARISONS: Brain CT from 10/24/2018. FINDINGS: Acute intracranial hemorrhage: None. Extra-axial spaces: Unremarkable in light of the parenchymal volume. Ventricular system: Unremarkable. Basal cisterns: Normal. Midline shift: None. Vascular system: Unremarkable. Cerebral parenchyma: White matter: Normal. Parenchymal volume: The parenchymal volume is within normal limits. Cerebellum: Unremarkable. Brainstem: Within normal limits. Paranasal sinuses and mastoid air cells: The paranasal sinuses and mastoid air cells are well pneumatized. Orbits and extracranial soft tissues: Unremarkable. Osseous structures: There is no acute osseous injury. 1.No acute intracranial abnormality identified. If there is ongoing concern for acute infarct, recommend brain MRI.     XR CHEST PORTABLE    Result Date: 12/14/2022  CLINICAL HISTORY: Code stroke TECHNIQUE: Single AP view of the chest FINDINGS: Lines and tubes: None. Cardiomediastinal: Normal size and configuration of the cardiomediastinal silhouette. No pneumomediastinum. Lungs and pleura: The lungs are grossly unremarkable. No pleural effusion. No pneumothorax. Musculoskeletal: No acute osseous abnormalities. Osseous degenerative changes. Other: None. No acute cardiopulmonary process identified. CTA HEAD NECK W CONTRAST    Result Date: 12/14/2022  CTA Head and Neck History: Stroke symptoms Technique: Axial helical acquisition of the head and neck with intravenous contrast was performed. Sagittal and coronal reconstructions were performed. NASCET criteria was utilized. 3D postprocessing was utilized. Dose reduction techniques were utilized. COMPARISON: None FINDINGS: Aortic Arch: Normal three-vessel anatomic configuration of the aortic arch is demonstrated. Origins of the great vessels appear normal. Extracranial Carotid Arterial System: The RIGHT common carotid artery, internal carotid artery, and external carotid artery demonstrate no abnormality. The LEFT common carotid artery, internal carotid artery, and external carotid artery demonstrate no abnormality. Extracranial Vertebral Arterial System: The RIGHT vertebral artery demonstrates no abnormality. The LEFT vertebral artery demonstrates no abnormality. The vertebral arteries are codominant. Intracranial Anterior Circulation: The RIGHT anterior circulation including the right internal carotid artery, middle cerebral artery, and anterior cerebral artery demonstrates no abnormality. The LEFT anterior circulation including the left internal carotid artery, middle cerebral artery, and anterior cerebral artery demonstrates no abnormality. The anterior communicating artery is unremarkable. A left posterior communicating artery is identified. Vertebrobasilar Circulation:  The basilar artery is unremarkable. The RIGHT posterior cerebral artery, superior cerebellar artery, and posterior inferior cerebellar artery demonstrate no abnormality. The LEFT posterior cerebral artery, superior cerebellar artery, and posterior inferior cerebellar artery demonstrate no abnormality. Other: The visualized dural sinuses and intradural venous system are unremarkable. No evidence of intracranial mass, mass effect, or abnormal enhancement. The skull base, calvaria, orbits, and overlying soft tissues are intact. The visualized soft tissues of the neck and superior mediastinum are within normal limits. Mild scarring in the lung apices. No osseous abnormality is identified. No evidence of hemodynamically significant vascular injury or aneurysm. MRI BRAIN W WO CONTRAST    Result Date: 12/14/2022  NO PRIOR REPORT AVAILABLE Exam: MRI OF THE BRAIN WITHOUT AND WITHINTRAVENOUS CONTRAST Clinical data:Stroke. Technique: Multiplanar multi-sequence MRI of the brain without and with intravenous gadolinium. Diffusion-weighted imaging is submitted. Contrast: MultiHance. Amount: 15 mL. Prior studies: CTA of the brain and neck dated 05/17/2022 report. CT of the brain dated 05/17/2022 report. MRI of the brain report. Findings: No abnormal parenchymal signal is present. No acute intracranial abnormality, specifically, cortical infarction, hemorrhage, mass or mass effect is seen. The ventricular system is normal in size, shape, and contour. No abnormal extra-axial fluid collections are present. The marrow signal within the skull base and calvarium is intact. The paranasal sinuses and mastoid air cells are clear. Larger intracranial flow voids are intact. No abnormal leptomeningeal, parenchymal or ependymal enhancement. DWI/ADC: Negative. 1.  No significant intracranial abnormalities. Recommendation: Follow up as clinically indicated.  Dictated and Electronically Signed by Sean Varela MD at 14-Dec-2022 02:10:06 PM               Pertinent Labs:  CBC:   Recent Labs     12/14/22  0819 12/15/22  0314   WBC 7.2 7.3   HGB 15.0 13.3    284     BMP:    Recent Labs     12/14/22  0819 12/15/22  0314    141   K 3.7 3.8   CL 99 106   CO2 30* 24   BUN 9 11   CREATININE 0.7 0.6   GLUCOSE 125* 122*     INR:   Recent Labs     12/14/22 0819   INR 0.85*       Physical Exam:  Vital Signs: /78   Pulse 71   Temp 97.7 °F (36.5 °C) (Temporal)   Resp 20   Ht 5' 5\" (1.651 m)   Wt 147 lb (66.7 kg)   SpO2 94%   BMI 24.46 kg/m²   General appearance:. Alert and Cooperative   HEENT: Normocephalic. Chest: Breath sounds clear and equal bilaterally without wheezes or rhonchi. Cardiac: RRR, S1, S2 normal. No murmurs, gallops, or rubs auscultated. Abdomen: soft, non-tender; non-distended normal bowel sounds no masses, no organomegaly. Extremities: No clubbing or cyanosis. No peripheral edema. Peripheral pulses palpable. Neurologic: Grossly intact. Discharge Medications:          Medication List        CONTINUE taking these medications      amitriptyline 25 MG tablet  Commonly known as: ELAVIL  Take 2 tablets by mouth nightly     amLODIPine 5 MG tablet  Commonly known as: NORVASC  TAKE 1 TABLET BY MOUTH NIGHTLY     aspirin 81 MG EC tablet  Take 1 tablet by mouth every evening     busPIRone 5 MG tablet  Commonly known as: BUSPAR  TAKE 1 TABLET BY MOUTH THREE TIMES DAILY AS NEEDED FOR ANXIETY     clopidogrel 75 MG tablet  Commonly known as: Plavix  Take 1 tablet by mouth every evening     desvenlafaxine succinate 50 MG Tb24 extended release tablet  Commonly known as: PRISTIQ  TAKE 1 TABLET BY MOUTH ONCE DAILY     fluticasone 50 MCG/ACT nasal spray  Commonly known as: FLONASE  1 spray by Nasal route in the morning. hydroCHLOROthiazide 25 MG tablet  Commonly known as: HYDRODIURIL  Take 0.5 tablets by mouth in the morning.      pantoprazole 20 MG tablet  Commonly known as: Protonix  Take 1 tablet by mouth daily     rosuvastatin 40 MG tablet  Commonly known as: CRESTOR  Take 1 tablet by mouth nightly     vitamin C 500 MG tablet  Commonly known as: ASCORBIC ACID     vitamin E 1000 units capsule     zinc 50 MG Caps            ASK your doctor about these medications      predniSONE 10 MG tablet  Commonly known as: DELTASONE  Take 6 tabs for 2 days, 5 tabs for 2 days, 4 tabs for 2 days, 3 tabs for 2 days, 2 tabs for 2 days, 1 tab for 2 days. Discharge Instructions: Follow up with Leonid Mix MD in 7 days d/t elevated A1C and [facility] f/u. Follow-up with neurology in 2 weeks. Take medications as directed. Resume activity as tolerated. Diet: ADULT DIET; Regular     Disposition: Patient is medically stable and will be discharged this afternoon. Time spent on discharge 38 minutes spent in assessing patient, reviewing medications, discussion with nursing, confirming safe discharge plan and preparation of discharge summary. Signed:  Electronically signed by JUAN JOSE Marcus CNP on 12/15/22 at 12:16 PM CST         EMR Dragon/Transcription disclaimer:   Much of this encounter note is an electronic transcription/translation of spoken language to printed text.  The electronic translation of spoken language may permit erroneous, or at times, nonsensical words or phrases to be inadvertently transcribed; although attempts have made to review the note for such errors, some may still exist.

## 2022-12-15 NOTE — PROGRESS NOTES
The patient states she passed her swallowing screening in the ED and was permitted to begin oral intake. Her order still reads NPO. Per RN swallowing screening in Epic, it is documented that she passed the 3oz swallow screen. Today she denies any difficulty swallowing liquids, solids or medications. The patient did not feel that a full swallowing evaluation was necessary at this time.         Electronically Signed By:  Kanchan Jung M.S., CCC-SLP  12/15/2022,9:08 AM.

## 2022-12-15 NOTE — ED NOTES
Called 4th floor to see if the bed was ready. Staff states it is not ready.      Saleem Barnes, RN  12/14/22 0864

## 2022-12-16 ENCOUNTER — CARE COORDINATION (OUTPATIENT)
Dept: CASE MANAGEMENT | Age: 52
End: 2022-12-16

## 2022-12-16 DIAGNOSIS — I63.9 CEREBROVASCULAR ACCIDENT (CVA), UNSPECIFIED MECHANISM (HCC): ICD-10-CM

## 2022-12-16 DIAGNOSIS — G45.9 TRANSIENT ISCHEMIC ATTACK (TIA): Primary | ICD-10-CM

## 2022-12-16 RX ORDER — AMITRIPTYLINE HYDROCHLORIDE 25 MG/1
50 TABLET, FILM COATED ORAL NIGHTLY
Qty: 60 TABLET | Refills: 2 | Status: SHIPPED | OUTPATIENT
Start: 2022-12-16 | End: 2023-01-15

## 2022-12-16 RX ORDER — AMLODIPINE BESYLATE 5 MG/1
5 TABLET ORAL NIGHTLY
Qty: 30 TABLET | Refills: 3 | Status: SHIPPED | OUTPATIENT
Start: 2022-12-16 | End: 2023-01-15

## 2022-12-16 RX ORDER — ROSUVASTATIN CALCIUM 40 MG/1
40 TABLET, COATED ORAL NIGHTLY
Qty: 30 TABLET | Refills: 3 | Status: SHIPPED | OUTPATIENT
Start: 2022-12-16

## 2022-12-16 NOTE — CARE COORDINATION
[facility] Care Transitions Initial Follow Up Call    Call within 2 business days of discharge: Yes    Care Transition Nurse contacted the patient by telephone to perform post hospital discharge assessment. Verified name and  with patient as identifiers. Provided introduction to self, and explanation of the Care Transition Nurse role. Patient: Bambi Gonzalez Patient : 1970   MRN: [id number]  Reason for Admission:   Discharge Date: 12/15/22 RARS: Readmission Risk Score: 6.5      Last Discharge  Street       Date Complaint Diagnosis Description Type Department Provider    22 Extremity Weakness Cerebrovascular accident (CVA), unspecified mechanism ([facility] [address] ED to Hosp-Admission (Discharged) ([facility]re[facility] Wil[facility]) [facility] Nicol Carpenter MD; Geoff Silver. .. Challenges to be reviewed by the provider   Additional needs identified to be addressed with provider: No  none               Method of communication with provider: none. Spoke with : [address] [place] [address] Transition Nurse reviewed discharge instructions with patient who verbalized understanding. The patient was given an opportunity to ask questions and does not have any further questions or concerns at this time. Were discharge instructions available to patient? Yes. Reviewed appropriate site of care based on symptoms and resources available to patient including: PCP  Specialist. The patient agrees to contact the PCP office for questions related to their healthcare. Advance Care Planning:   Does patient have an Advance Directive: not on file; education provided. Medication reconciliation was performed with patient, who verbalizes understanding of administration of home medications.  Medications reviewed, 1111F entered: yes    Was patient discharged with a pulse oximeter? no    Non-face-to-face services provided:  Obtained and reviewed discharge summary and/or continuity of care documents    Offered patient enrollment in the Remote Patient Monitoring (RPM) program for in-home monitoring: NA.    Care Transitions 24 Hour Call    Do you have a copy of your discharge instructions?: Yes  Do you have all of your prescriptions and are they filled?: Yes  Have you been contacted by a [address]?: No  Have you scheduled your follow up appointment?: Yes  How are you going to get to your appointment?: Car - family or friend to transport  Do you feel like you have everything you need to keep you well at home?: Yes  Are you an active caregiver in your home?: No  Care Transitions Interventions         Follow Up : Spoke with patient today for CTN initial call after discharge. She says she is feeling better. She says she is tired and her arm and leg wear out easily but it is getting better. Says she had this happen before but on the right side. She is on blood thinners. She says her  is changing jobs and her insurance may be changing so she might be changing times she is seen. CTN advised her to do what she needed to do but to make sure and follow up on her issues. She has her Zio patch applied, knows when to return for processing, instructed to get HFU to follow up on that. Meds were reviewed. She has HFU on 12/22, and is calling for Neuro appt and Cardio appt. She has not had the Cvoid vaccine, listed PHCDM and does not have LW. She is accepting of CTN calls and f/u. CTN will f/u at a later time. Future Appointments   Date Time Provider Garfield Ling   12/22/2022  2:30 PM Karen Meadows MD [facility]   1/68/8839  5:09 AM JUAN JOSE Jean [facility]       Care Transition Nurse provided contact information. Plan for follow-up call in 5-7 days based on severity of symptoms and risk factors.   Plan for next call:  HFU, med changes, left side weakness, Zio f/u    Prabha Cruz RN

## 2022-12-18 LAB
EKG P AXIS: -6 DEGREES
EKG P-R INTERVAL: 164 MS
EKG Q-T INTERVAL: 432 MS
EKG QRS DURATION: 76 MS
EKG QTC CALCULATION (BAZETT): 446 MS
EKG T AXIS: 48 DEGREES

## 2022-12-20 ENCOUNTER — TELEPHONE (OUTPATIENT)
Dept: NEUROSURGERY | Age: 52
End: 2022-12-20

## 2022-12-20 ENCOUNTER — TELEPHONE (OUTPATIENT)
Dept: PRIMARY CARE CLINIC | Age: 52
End: 2022-12-20

## 2022-12-20 NOTE — TELEPHONE ENCOUNTER
S/w pt, she had to cancel her HFU; her insurance will not  until February. I assumed that would be far to late so we did not reschedule.

## 2022-12-21 NOTE — TELEPHONE ENCOUNTER
Tried to reach patient to see what I could do to help her, no answer on her phone or vm set up. Will try to reach her again.

## 2022-12-23 ENCOUNTER — CARE COORDINATION (OUTPATIENT)
Dept: CASE MANAGEMENT | Age: 52
End: 2022-12-23

## 2022-12-23 NOTE — CARE COORDINATION
[facility] Transitions Follow Up Call    2022    Patient: Destini Chavez  Patient : 1970   MRN: [id number]  Reason for Admission: Weakness of left side of body  Discharge Date: 12/15/22 RARS: Readmission Risk Score: 6.5         Spoke with: mario    [facility] attempted outreach for care transition follow up call. Left HIPPA compliant message and contact information for call back. Care Transitions Subsequent and Final Call    Subsequent and Final Calls  Care Transitions Interventions  Other Interventions:              Follow Up  Future Appointments   Date Time Provider Garfield Ling     4:29 AM Kevin Young, [facility] ALESSANDRO Jackson

## 2022-12-28 ENCOUNTER — CARE COORDINATION (OUTPATIENT)
Dept: CASE MANAGEMENT | Age: 52
End: 2022-12-28

## 2022-12-28 NOTE — CARE COORDINATION
[facility] Care Transitions Follow Up Call    Care Transition Nurse contacted the patient by telephone to follow up. Verified name and  with patient as identifiers. Patient: María Calhoun  Patient : 1970   MRN: [id number]  Reason for Admission:   Discharge Date: 12/15/22 RARS: Readmission Risk Score: 6.5      Needs to be reviewed by the provider   Additional needs identified to be addressed with provider: No  none             Method of communication with provider: none. Spoke with; María Calhoun      Follow Up : Spoke with patient today for f/u call. She says she is feeling some better. Her insurance has lapsed, and she will not have insurance until February. She says she will then be able to follow up with PCP, Neurology, and her doctors in [place]. She sees doctor there for RENEE she says. She says her issues come and go, but are getting better. CTN advised her to make sure and follow up with PCP when the time arises after getting her insurance reinstated, or seek medical attention in ED if problems arise. Will discharge from CTN services as this time.    Future Appointments   Date Time Provider Garfield Ling     0:55 AM JUAN JOSE Pete [facility]          Care Transitions Subsequent and Final Call    Subsequent and Final Calls  Do you have any ongoing symptoms?: No  Have your medications changed?: No  Do you have any questions related to your medications?: No  Do you currently have any active services?: No  Do you have any needs or concerns that I can assist you with?: No  Identified Barriers: None  Care Transitions Interventions  Other Interventions:               Hood Simmons RN

## 2022-12-29 DIAGNOSIS — M54.40 CHRONIC BILATERAL LOW BACK PAIN WITH SCIATICA, SCIATICA LATERALITY UNSPECIFIED: ICD-10-CM

## 2022-12-29 DIAGNOSIS — G89.29 CHRONIC BILATERAL LOW BACK PAIN WITH SCIATICA, SCIATICA LATERALITY UNSPECIFIED: ICD-10-CM

## 2022-12-29 NOTE — TELEPHONE ENCOUNTER
Destini Chavez called to request a refill on her medication.       Last office visit : 7/28/2022   Next office visit : 3/13/2023 establish care- previous [place] pt    Requested Prescriptions     Pending Prescriptions Disp Refills    desvenlafaxine succinate (PRISTIQ) 50 MG TB24 extended release tablet [Pharmacy Med Name: DESVENLAFAXINE ER 50MG TABLET EXTENDED RELEASE 24 HOUR] 30 tablet 2     Sig: TAKE 1 TABLET BY MOUTH ONCE DAILY            ALESSANDRO Valladares

## 2022-12-30 RX ORDER — DESVENLAFAXINE 50 MG/1
TABLET, EXTENDED RELEASE ORAL
Qty: 30 TABLET | Refills: 2 | Status: SHIPPED | OUTPATIENT
Start: 2022-12-30

## 2023-01-06 NOTE — PROCEDURES
Cardiac event monitor report    Date of recording 12/15/2022 through 12/29/2022    Summary impressions:    Sinus rhythm minimal heart rate 52 average 74 maximal 143    2. 19 episode supraventricular tachycardia fastest interval 4 beats at a maximum rate of 184 longest 17 beats average rate 138    3. No episodes of ventricular tachycardia were recorded    4. No pauses 3.0 seconds or longer were recorded    5. No episodes of complete or Mobitz 2 atrioventricular block were recorded    6. No episodes of atrial fibrillation were recorded    7. 2 triggered events 0 diary events were recorded to sinus rhythm during those recordings    8.  Rare ectopy otherwise noted

## 2023-01-11 DIAGNOSIS — I47.1 SUPRAVENTRICULAR TACHYCARDIA SEEN ON CARDIAC MONITOR (HCC): Primary | ICD-10-CM

## 2023-01-19 ENCOUNTER — TELEPHONE (OUTPATIENT)
Dept: CARDIOLOGY CLINIC | Age: 53
End: 2023-01-19

## 2023-01-19 NOTE — TELEPHONE ENCOUNTER
Called and unable to reach Pt. Left vm for Pt to reschedule apptRubina Marie out of clinic today, 1/19/2023, Pt will need to reschedule to next week with Abdi Marie.

## 2023-01-24 ASSESSMENT — ENCOUNTER SYMPTOMS
VOMITING: 0
SHORTNESS OF BREATH: 0
BACK PAIN: 0
NAUSEA: 0
ABDOMINAL PAIN: 0
SORE THROAT: 0
DIARRHEA: 0
RHINORRHEA: 0
COUGH: 0

## 2023-01-25 ENCOUNTER — OFFICE VISIT (OUTPATIENT)
Dept: CARDIOLOGY CLINIC | Age: 53
End: 2023-01-25

## 2023-01-25 VITALS
DIASTOLIC BLOOD PRESSURE: 84 MMHG | OXYGEN SATURATION: 100 % | HEART RATE: 75 BPM | BODY MASS INDEX: 24.22 KG/M2 | SYSTOLIC BLOOD PRESSURE: 128 MMHG | WEIGHT: 145.4 LBS | HEIGHT: 65 IN

## 2023-01-25 DIAGNOSIS — E78.5 DYSLIPIDEMIA: ICD-10-CM

## 2023-01-25 DIAGNOSIS — Z82.49 FAMILY HISTORY OF EARLY CAD: ICD-10-CM

## 2023-01-25 DIAGNOSIS — I47.1 SVT (SUPRAVENTRICULAR TACHYCARDIA) (HCC): ICD-10-CM

## 2023-01-25 DIAGNOSIS — I10 ESSENTIAL HYPERTENSION: Primary | ICD-10-CM

## 2023-01-25 RX ORDER — METOPROLOL SUCCINATE 25 MG/1
25 TABLET, EXTENDED RELEASE ORAL DAILY
Qty: 90 TABLET | Refills: 1 | Status: SHIPPED | OUTPATIENT
Start: 2023-01-25

## 2023-01-25 RX ORDER — MULTIVIT-MIN/IRON/FOLIC ACID/K 18-600-40
CAPSULE ORAL
COMMUNITY

## 2023-01-25 ASSESSMENT — ENCOUNTER SYMPTOMS
WHEEZING: 0
SHORTNESS OF BREATH: 0
SORE THROAT: 0
COUGH: 0
CHEST TIGHTNESS: 0

## 2023-01-25 NOTE — PROGRESS NOTES
[facility]  [address] [facility] [address] [facility] [address] [address]. [facility]  [address]  [phone]      Chief Complaint / Reason for Being Seen: Palpitations and SVT noted on ZIO patch. 1. Essential hypertension    2. Dyslipidemia    3. SVT (supraventricular tachycardia) (HCC)        Patient with a history of hypertension, COPD, TIA, CVA. She presented to the ER on 12/14/2022 with complaints of left-sided weakness. She was started at on Plavix, statins and aspirin back in May 2022 with recurrent TIAs. Work-up included negative head CT, negative CTA of the head and neck, negative chest x-ray. Negative MRI of the brain with and without contrast, 2D echo showed an EF of 55 to 60%. Chemistry panel unremarkable. Troponins were negative. CBC unremarkable. She was admitted and neurology was consulted. During hospitalization her left-sided weakness improved. She was discharged on her antiplatelet therapy with the aspirin and Plavix. She was told to continue statin. They did place a ZIO patch at discharge. ZIO patch showed:    Sinus rhythm minimal heart rate 52 average 74 maximal 143     2. 19 episode supraventricular tachycardia fastest interval 4   beats at a maximum rate of 184 longest 17 beats average rate 138     3. No episodes of ventricular tachycardia were recorded     4. No pauses 3.0 seconds or longer were recorded     5. No episodes of complete or Mobitz 2 atrioventricular block   were recorded     6. No episodes of atrial fibrillation were recorded     7. 2 triggered events 0 diary events were recorded to sinus   rhythm during those recordings     8. Rare ectopy otherwise noted     Summary   Normal left ventricular size with preserved LV function and an estimated   ejection fraction of approximately 55-60%. Normal left ventricular wall   thickness. No regional wall motion abnormalities. Normal diastolic filling   pattern. Normal right ventricular size with preserved RV function. Normal bi-atrial size. Negative bubble study. No clinically significant valvular stenosis or regurgitation. Aortic root is within normal limits. Ascending aorta is within normal limits. Trace circumferential pericardial effusion noted. The rhythm is sinus. Consider REBECCA for further evaluation of possible intracardiac source of   thromboembolism, if clinically indicated. Signature      ----------------------------------------------------------------   Electronically signed by Omayra Pena(Interpreting physician)   on 12/14/2022 11:10 AM   ----------------------------------------------------------------      Subjective: Patient with a family history of coronary artery disease which includes her mother having had stents and pacemaker placement. Maternal grandmother as well has had stents in place maker placement. She is a non-smoker. Patient states she has palpitations every day. They are bothersome. She does get some chest discomfort with the palpitations. There is no shortness of breath, orthopnea or PND. Occasionally patient will have lightheadedness with the palpitations but no syncope. Old records have been obtained from the referring providers. Those records have been reviewed and summarized.       Juanito Alejandra is a 50 y.o. female with the following history as recorded in [facility]:  Patient Active Problem List    Diagnosis Date Noted    Weakness of left side of body 12/14/2022    Cerebrovascular accident (CVA) ([facility] [address] 05/20/2022    Numbness 05/20/2022    Weakness 05/20/2022    Essential hypertension 05/20/2022    Transient ischemic attack (TIA) 05/08/2022    Allergic rhinitis 11/10/2021    Chronic bilateral low back pain with sciatica 09/16/2021    Hypertension, essential, benign 07/16/2021    Sciatica of right side 06/24/2020    Anxiety 06/04/2020    Fibromyalgia 07/29/2018    Other chronic pain 07/29/2018    Chronic back pain 07/29/2018    Insomnia 07/29/2018    Vaginal atrophy 07/29/2018    Hormone deficiency 07/29/2018    Depression 07/29/2018    Elevated blood pressure, situational 05/29/2018    COPD (chronic obstructive pulmonary disease) with chronic bronchitis (HCC) 08/15/2017    Prediabetes 08/15/2017    Fatigue     Chest pain, exertional     Essential tremor     Chronic migraine without aura, intractable, with status migrainosus     Chronic migraine without aura, intractable, without status migrainosus     Tremor 02/19/2016    Moderate persistent asthma without complication 66/41/7519    Vitamin D deficiency 02/19/2016    Chronic bronchitis ([facility] [address] 10/27/2015    Irritable bowel syndrome with diarrhea 09/23/2015    Transaminitis 09/23/2015    Hepatic steatosis 09/23/2015    History of liver biopsy 09/23/2015     Current Outpatient Medications   Medication Sig Dispense Refill    Cholecalciferol (VITAMIN D) 50 MCG (2000 UT) CAPS capsule Take by mouth      metoprolol succinate (TOPROL XL) 25 MG extended release tablet Take 1 tablet by mouth daily 90 tablet 1    desvenlafaxine succinate (PRISTIQ) 50 MG TB24 extended release tablet TAKE 1 TABLET BY MOUTH ONCE DAILY 30 tablet 2    amitriptyline (ELAVIL) 25 MG tablet Take 2 tablets by mouth nightly 60 tablet 2    amLODIPine (NORVASC) 5 MG tablet Take 1 tablet by mouth nightly 30 tablet 3    rosuvastatin (CRESTOR) 40 MG tablet Take 1 tablet by mouth nightly 30 tablet 3    busPIRone (BUSPAR) 5 MG tablet TAKE 1 TABLET BY MOUTH THREE TIMES DAILY AS NEEDED FOR ANXIETY 90 tablet 1    aspirin 81 MG EC tablet Take 1 tablet by mouth every evening 30 tablet 5    clopidogrel (PLAVIX) 75 MG tablet Take 1 tablet by mouth every evening 30 tablet 5    fluticasone (FLONASE) 50 MCG/ACT nasal spray 1 spray by Nasal route in the morning. (Patient taking differently: 1 spray by Nasal route as needed) 1 each 5    hydroCHLOROthiazide (HYDRODIURIL) 25 MG tablet Take 0.5 tablets by mouth in the morning.  30 tablet 5    pantoprazole (PROTONIX) 20 MG tablet Take 1 tablet by mouth daily 30 tablet 3    vitamin C (ASCORBIC ACID) 500 MG tablet Take 500 mg by mouth daily      zinc 50 MG CAPS Take 50 mg by mouth daily      vitamin E 1000 units capsule Take 1,000 Units by mouth daily       No current facility-administered medications for this visit. Allergies: Latex, Codeine, Morphine, Morphine and related, and Eggs or egg-derived products  Past Medical History:   Diagnosis Date    Allergic rhinitis 11/10/2021    Anxiety 2020    Asthma     Chronic back pain 2018    Chronic migraine without aura, intractable, without status migrainosus     Colon polyps     Depression 2018    Elevated blood pressure, situational 2018    Essential tremor     Fibromyalgia     Liver disease     Fatty Liver    Meniere disease     Osteoarthritis     Sciatica of right side 2020    Tinnitus      Past Surgical History:   Procedure Laterality Date     SECTION      CHOLECYSTECTOMY, LAPAROSCOPIC  2015    Dr. Eleazar Holder  ?    ? HYSTERECTOMY (CERVIX STATUS UNKNOWN)      LIVER BIOPSY  2015    Dr. Kennedy Hayes     Family History   Problem Relation Age of Onset    Breast Cancer Mother 50    Colon Polyps Mother     Liver Cancer Mother     Heart Disease Mother     Cancer Mother     Diabetes Mother     Heart Disease Father     Cancer Father     Colon Polyps Maternal Grandmother     Breast Cancer Maternal Grandfather 46    Colon Polyps Paternal Grandfather     Liver Cancer Paternal Grandfather     Colon Cancer Neg Hx     Esophageal Cancer Neg Hx     Liver Disease Neg Hx     Rectal Cancer Neg Hx     Stomach Cancer Neg Hx      Social History     Tobacco Use    Smoking status: Never    Smokeless tobacco: Never   Substance Use Topics    Alcohol use: No          Review of System:    Review of Systems   Constitutional:  Negative for activity change, chills, diaphoresis, fatigue and fever. HENT:  Negative for congestion and sore throat.     Respiratory:  Negative for cough, chest tightness, shortness of breath and wheezing. Cardiovascular:  Positive for palpitations. Negative for chest pain and leg swelling. Neurological:  Positive for dizziness and light-headedness. Negative for syncope and headaches. Psychiatric/Behavioral:  Negative for confusion. The patient is not nervous/anxious. Objective:    /84   Pulse 75   Ht 5' 5\" (1.651 m)   Wt 145 lb 6.4 oz (66 kg)   SpO2 100%   BMI 24.20 kg/m²     GENERAL - well developed and well nourished    HEENT -  PERRLA, Hearing appears normal, conjunctive and lids are normal.  External inspection of ears and nose appear normal.  NECK - no thyromegaly, no JVD, trachea is in the midline  CARDIOVASCULAR - PMI is in the mid line clavicular position, Normal S1 and S2 with no  systolic murmur. No S3 or S4    PULMONARY - no respiratory distress. No wheezes or rales. Lungs are clear to ausculation, normal respiratory effort. ABDOMEN  - soft, non tender, no rebound  MUSCULOSKELETAL  - range of motion of the upper and lower extermites appears normal and equal and is without pain   EXTREMITIES - no significant edema   NEUROLOGIC - gait and station are normal  SKIN - turgor is normal, skin warm and dry. PSYCHIATRIC - normal mood and affect, alert and orientated x 3,      ASSESSMENT:    ALL THE CARDIOLOGY PROBLEMS ARE LISTED ABOVE; HOWEVER, THE FOLLOWING SPECIFIC CARDIAC PROBLEMS / CONDITIONS WERE ADDRESSED AND TREATED DURING THE OFFICE VISIT TODAY:       Cardiac Specific Problem  Discussion and Plan         1. Palpitations/SVT Initial encounter   EKG in the office today showing sinus rhythm with a heart rate of 75 bpm.  We will start Toprol-XL 25 mg daily. Patient instructed to keep blood pressure and pulse rate log. We will have patient return to clinic to see Dr. Bria Delgado for further evaluation and treatment. 2. Hypertension Initial encounter   Blood pressure in the office today 128/84. O2 sat 100%. Patient is on Norvasc 5 mg nightly. Hydrochlorothiazide 12.5 mg daily. Continue present medical management         3. Family history of early coronary artery disease Initial encounter   Patient is on aspirin, Plavix and Crestor for history of TIAs. Continue present medical management. PLAN:    Orders Placed This Encounter   Procedures    EKG 12 lead     Order Specific Question:   Reason for Exam?     Answer: Other     Orders Placed This Encounter   Medications    metoprolol succinate (TOPROL XL) 25 MG extended release tablet     Sig: Take 1 tablet by mouth daily     Dispense:  90 tablet     Refill:  1       Return in about 2 weeks (around 2/8/2023) for Dr Juliann Stapleton ([facility]) . Discussed with patient. I greatly appreciate the opportunity to meet Doris Palacios and your confidence in allowing me to participate in her cardiovascular care. JUAN JOSE Augustin NP 1/25/2023 10:46 AM CST                    This dictation was generated by voice recognition computer software. Although all attempts are made to edit dictation for accuracy, there may be errors in the transcription that are not intended.

## 2023-01-27 ENCOUNTER — APPOINTMENT (OUTPATIENT)
Dept: GENERAL RADIOLOGY | Age: 53
End: 2023-01-27
Payer: COMMERCIAL

## 2023-01-27 ENCOUNTER — HOSPITAL ENCOUNTER (EMERGENCY)
Age: 53
Discharge: HOME | End: 2023-01-28
Attending: PEDIATRICS
Payer: COMMERCIAL

## 2023-01-27 ENCOUNTER — APPOINTMENT (OUTPATIENT)
Dept: CT IMAGING | Age: 53
End: 2023-01-27
Payer: COMMERCIAL

## 2023-01-27 ENCOUNTER — APPOINTMENT (OUTPATIENT)
Dept: MRI IMAGING | Age: 53
End: 2023-01-27
Payer: COMMERCIAL

## 2023-01-27 DIAGNOSIS — G45.9 TIA (TRANSIENT ISCHEMIC ATTACK): Primary | ICD-10-CM

## 2023-01-27 LAB
ALBUMIN SERPL-MCNC: 5.2 G/DL (ref 3.5–5.2)
ALP BLD-CCNC: 53 U/L (ref 35–104)
ALT SERPL-CCNC: 36 U/L (ref 5–33)
AMPHETAMINE SCREEN, URINE: NEGATIVE
ANION GAP SERPL CALCULATED.3IONS-SCNC: 14 MMOL/L (ref 7–19)
AST SERPL-CCNC: 29 U/L (ref 5–32)
BARBITURATE SCREEN URINE: NEGATIVE
BASOPHILS ABSOLUTE: 0.1 K/UL (ref 0–0.2)
BASOPHILS RELATIVE PERCENT: 0.9 % (ref 0–1)
BENZODIAZEPINE SCREEN, URINE: NEGATIVE
BILIRUB SERPL-MCNC: 0.3 MG/DL (ref 0.2–1.2)
BUN BLDV-MCNC: 8 MG/DL (ref 6–20)
BUPRENORPHINE URINE: NEGATIVE
CALCIUM SERPL-MCNC: 9.9 MG/DL (ref 8.6–10)
CANNABINOID SCREEN URINE: NEGATIVE
CHLORIDE BLD-SCNC: 103 MMOL/L (ref 98–111)
CO2: 22 MMOL/L (ref 22–29)
COCAINE METABOLITE SCREEN URINE: NEGATIVE
CREAT SERPL-MCNC: 0.7 MG/DL (ref 0.5–0.9)
EOSINOPHILS ABSOLUTE: 0.2 K/UL (ref 0–0.6)
EOSINOPHILS RELATIVE PERCENT: 2.1 % (ref 0–5)
GFR SERPL CREATININE-BSD FRML MDRD: >60 ML/MIN/{1.73_M2}
GLUCOSE BLD-MCNC: 103 MG/DL (ref 74–109)
GLUCOSE BLD-MCNC: 126 MG/DL (ref 70–99)
HCT VFR BLD CALC: 48.4 % (ref 37–47)
HEMOGLOBIN: 16.9 G/DL (ref 12–16)
IMMATURE GRANULOCYTES #: 0 K/UL
LYMPHOCYTES ABSOLUTE: 3.7 K/UL (ref 1.1–4.5)
LYMPHOCYTES RELATIVE PERCENT: 37.8 % (ref 20–40)
Lab: NORMAL
MAGNESIUM: 2.8 MG/DL (ref 1.6–2.6)
MCH RBC QN AUTO: 30.6 PG (ref 27–31)
MCHC RBC AUTO-ENTMCNC: 34.9 G/DL (ref 33–37)
MCV RBC AUTO: 87.7 FL (ref 81–99)
METHADONE SCREEN, URINE: NEGATIVE
METHAMPHETAMINE, URINE: NEGATIVE
MONOCYTES ABSOLUTE: 0.9 K/UL (ref 0–0.9)
MONOCYTES RELATIVE PERCENT: 8.7 % (ref 0–10)
NEUTROPHILS ABSOLUTE: 4.9 K/UL (ref 1.5–7.5)
NEUTROPHILS RELATIVE PERCENT: 50.3 % (ref 50–65)
OPIATE SCREEN URINE: NEGATIVE
OXYCODONE URINE: NEGATIVE
PDW BLD-RTO: 11.8 % (ref 11.5–14.5)
PERFORMED ON: ABNORMAL
PHENCYCLIDINE SCREEN URINE: NEGATIVE
PLATELET # BLD: 317 K/UL (ref 130–400)
PMV BLD AUTO: 10.4 FL (ref 9.4–12.3)
POTASSIUM SERPL-SCNC: 4.7 MMOL/L (ref 3.5–5)
PROPOXYPHENE SCREEN: NEGATIVE
RBC # BLD: 5.52 M/UL (ref 4.2–5.4)
SARS-COV-2, NAAT: NOT DETECTED
SODIUM BLD-SCNC: 139 MMOL/L (ref 136–145)
T4 FREE: 1.19 NG/DL (ref 0.93–1.7)
TOTAL PROTEIN: 7.9 G/DL (ref 6.6–8.7)
TRICYCLIC, URINE: NEGATIVE
TROPONIN: <0.01 NG/ML (ref 0–0.03)
TSH REFLEX FT4: 5.54 UIU/ML (ref 0.35–5.5)
WBC # BLD: 9.7 K/UL (ref 4.8–10.8)

## 2023-01-27 PROCEDURE — 80053 COMPREHEN METABOLIC PANEL: CPT

## 2023-01-27 PROCEDURE — A9577 INJ MULTIHANCE: HCPCS | Performed by: PEDIATRICS

## 2023-01-27 PROCEDURE — 87635 SARS-COV-2 COVID-19 AMP PRB: CPT

## 2023-01-27 PROCEDURE — 85025 COMPLETE CBC W/AUTO DIFF WBC: CPT

## 2023-01-27 PROCEDURE — 36415 COLL VENOUS BLD VENIPUNCTURE: CPT

## 2023-01-27 PROCEDURE — 84439 ASSAY OF FREE THYROXINE: CPT

## 2023-01-27 PROCEDURE — 84484 ASSAY OF TROPONIN QUANT: CPT

## 2023-01-27 PROCEDURE — 84443 ASSAY THYROID STIM HORMONE: CPT

## 2023-01-27 PROCEDURE — 70553 MRI BRAIN STEM W/O & W/DYE: CPT

## 2023-01-27 PROCEDURE — 93005 ELECTROCARDIOGRAM TRACING: CPT | Performed by: PEDIATRICS

## 2023-01-27 PROCEDURE — 99285 EMERGENCY DEPT VISIT HI MDM: CPT

## 2023-01-27 PROCEDURE — 71045 X-RAY EXAM CHEST 1 VIEW: CPT | Performed by: RADIOLOGY

## 2023-01-27 PROCEDURE — 80306 DRUG TEST PRSMV INSTRMNT: CPT

## 2023-01-27 PROCEDURE — 83735 ASSAY OF MAGNESIUM: CPT

## 2023-01-27 PROCEDURE — 6360000004 HC RX CONTRAST MEDICATION: Performed by: PEDIATRICS

## 2023-01-27 PROCEDURE — 82962 GLUCOSE BLOOD TEST: CPT

## 2023-01-27 PROCEDURE — 70450 CT HEAD/BRAIN W/O DYE: CPT

## 2023-01-27 PROCEDURE — 96374 THER/PROPH/DIAG INJ IV PUSH: CPT

## 2023-01-27 PROCEDURE — 71045 X-RAY EXAM CHEST 1 VIEW: CPT

## 2023-01-27 PROCEDURE — 70496 CT ANGIOGRAPHY HEAD: CPT

## 2023-01-27 PROCEDURE — 6360000002 HC RX W HCPCS: Performed by: PEDIATRICS

## 2023-01-27 PROCEDURE — 70553 MRI BRAIN STEM W/O & W/DYE: CPT | Performed by: RADIOLOGY

## 2023-01-27 PROCEDURE — 70450 CT HEAD/BRAIN W/O DYE: CPT | Performed by: RADIOLOGY

## 2023-01-27 RX ORDER — LORAZEPAM 2 MG/ML
1 INJECTION INTRAMUSCULAR ONCE
Status: COMPLETED | OUTPATIENT
Start: 2023-01-27 | End: 2023-01-27

## 2023-01-27 RX ADMIN — IOPAMIDOL 90 ML: 755 INJECTION, SOLUTION INTRAVENOUS at 20:17

## 2023-01-27 RX ADMIN — GADOBENATE DIMEGLUMINE 13 ML: 529 INJECTION, SOLUTION INTRAVENOUS at 23:01

## 2023-01-27 RX ADMIN — LORAZEPAM 1 MG: 2 INJECTION INTRAMUSCULAR; INTRAVENOUS at 22:53

## 2023-01-27 RX ADMIN — IOPAMIDOL 90 ML: 755 INJECTION, SOLUTION INTRAVENOUS at 20:16

## 2023-01-27 NOTE — Clinical Note
Leticia Gitelman was seen and treated in our emergency department on 1/27/2023. She may return to work on 01/23/2023. If you have any questions or concerns, please don't hesitate to call.       Yamile Gutiérrez MD

## 2023-01-28 VITALS
DIASTOLIC BLOOD PRESSURE: 85 MMHG | RESPIRATION RATE: 20 BRPM | TEMPERATURE: 98 F | OXYGEN SATURATION: 98 % | SYSTOLIC BLOOD PRESSURE: 113 MMHG | HEART RATE: 64 BPM

## 2023-01-28 NOTE — CODE DOCUMENTATION
Code stroke called. Dr. Rafael Cabrera at bedside. Pt reports that she needed to urinate. Asked pt if she needed bedpan or bedside toilet. Pt requested bedside toilet. Pt able to get to bedside toilet with minimal assistance. Pt able to move left side to get to toilet.

## 2023-01-28 NOTE — ED NOTES
Spoke with Jayne in MRI. Per Jayne she is on the way and will contact ER when she is here.       Ancelmo Davies RN  01/27/23 [id number]

## 2023-01-28 NOTE — ED NOTES
Dr Misael Madison and Jason mason called about code shiraz @ 2001     [facility]  01/27/23 2005

## 2023-01-28 NOTE — ED NOTES
Called [facility] about no result yet for CTA head/neck. They will expedite the read.      Alejandra Baxter RN  01/27/23 0932

## 2023-01-28 NOTE — DISCHARGE INSTRUCTIONS
Follow up with [facility] neurology as scheduled on 2/7/2023. Return or seek medical attention with weakness, numbness, difficulty walking or speaking, or other concerns.

## 2023-01-30 LAB
EKG P AXIS: 43 DEGREES
EKG P-R INTERVAL: 170 MS
EKG Q-T INTERVAL: 432 MS
EKG QRS DURATION: 80 MS
EKG QTC CALCULATION (BAZETT): 449 MS
EKG T AXIS: 56 DEGREES

## 2023-01-30 PROCEDURE — 93010 ELECTROCARDIOGRAM REPORT: CPT | Performed by: INTERNAL MEDICINE

## 2023-02-01 ASSESSMENT — ENCOUNTER SYMPTOMS
RHINORRHEA: 0
ABDOMINAL PAIN: 0
BACK PAIN: 0
COUGH: 0
EYE DISCHARGE: 0
COLOR CHANGE: 0
NAUSEA: 0
VOMITING: 0
SHORTNESS OF BREATH: 0

## 2023-02-01 NOTE — ED PROVIDER NOTES
[facility] EMERGENCY DEPT  eMERGENCY dEPARTMENT eNCOUnter      Pt Name: Yola Epstein  MRN: [id number]  [place] 1970  Date of evaluation: 1/27/2023  Provider: Alice Butler MD    [address] [place] [address]       Chief Complaint   Patient presents with    Extremity Weakness     L sided extremity weakness approx 20-30          HISTORY OF PRESENT ILLNESS   (Location/Symptom, Timing/Onset,Context/Setting, Quality, Duration, Modifying Factors, Severity)  Note limiting factors. Yloa Epstein is a 50 y.o. female who presents to the emergency department with left-sided weakness. Patient and  give history.  states that patient began having symptoms \"in the car after he left [employer]. \"   states that at approximately 7 PM patient began having left-sided numbness of her upper lip and cheek. Numbness and tingling spread to the left side of her body.  noticed that \"remembering was difficult. Her speech was slow. \"  Patient then began having left-sided weakness. Has been noted that patient was anxious and her heart rate was 150 on her watch cameron earlier when they were still in the store. Patient has a history of TIA, hypertension, and stroke in the past.  Patient denies headache \"but her eyeball was hurting earlier. \"  Patient is currently taking aspirin and Plavix.  and, later, patient deny any symptoms earlier. Patient was \"agitated\" while in the store. Patient is currently taking metoprolol XR for her hypertension. HPI    NursingNotes were reviewed. REVIEW OF SYSTEMS    (2-9 systems for level 4, 10 or more for level 5)     Review of Systems   Constitutional:  Negative for chills and fever. HENT:  Negative for congestion and rhinorrhea. Eyes:  Negative for discharge. Respiratory:  Negative for cough and shortness of breath. Cardiovascular:  Negative for chest pain and palpitations. Gastrointestinal:  Negative for abdominal pain, nausea and vomiting.    Genitourinary: Negative for difficulty urinating and dysuria. Musculoskeletal:  Negative for back pain and neck pain. Skin:  Negative for color change and pallor. Neurological:  Positive for speech difficulty, weakness and numbness. Negative for syncope and light-headedness. Psychiatric/Behavioral:  Positive for agitation and confusion. All other systems reviewed and are negative. PAST MEDICALHISTORY     Past Medical History:   Diagnosis Date    Allergic rhinitis 11/10/2021    Anxiety 2020    Asthma     Chronic back pain 2018    Chronic migraine without aura, intractable, without status migrainosus     Colon polyps     Depression 2018    Elevated blood pressure, situational 2018    Essential tremor     Fibromyalgia     Liver disease     Fatty Liver    Meniere disease 2017    Osteoarthritis     Sciatica of right side 2020    Tinnitus          SURGICAL HISTORY       Past Surgical History:   Procedure Laterality Date     SECTION      CHOLECYSTECTOMY, LAPAROSCOPIC  2015    Dr. Ashu Vance  ?    ?     HYSTERECTOMY (CERVIX STATUS UNKNOWN)      LIVER BIOPSY  2015    Dr. Karlie Jimenez     Discharge Medication List as of 2023 12:42 AM        CONTINUE these medications which have NOT CHANGED    Details   Cholecalciferol (VITAMIN D) 50 MCG (2000) CAPS capsule Take by mouthHistorical Med      metoprolol succinate (TOPROL XL) 25 MG extended release tablet Take 1 tablet by mouth daily, Disp-90 tablet, R-1Normal      desvenlafaxine succinate (PRISTIQ) 50 MG TB24 extended release tablet TAKE 1 TABLET BY MOUTH ONCE DAILY, Disp-30 tablet, R-2Normal      amitriptyline (ELAVIL) 25 MG tablet Take 2 tablets by mouth nightly, Disp-60 tablet, R-2Normal      amLODIPine (NORVASC) 5 MG tablet Take 1 tablet by mouth nightly, Disp-30 tablet, R-3Normal      rosuvastatin (CRESTOR) 40 MG tablet Take 1 tablet by mouth nightly, Disp-30 tablet, R-3Normal busPIRone (BUSPAR) 5 MG tablet TAKE 1 TABLET BY MOUTH THREE TIMES DAILY AS NEEDED FOR ANXIETY, Disp-90 tablet, R-1Normal      aspirin 81 MG EC tablet Take 1 tablet by mouth every evening, Disp-30 tablet, R-5Normal      clopidogrel (PLAVIX) 75 MG tablet Take 1 tablet by mouth every evening, Disp-30 tablet, R-5Normal      fluticasone (FLONASE) 50 MCG/ACT nasal spray 1 spray by Nasal route in the morning., Disp-1 each, R-5Normal      hydroCHLOROthiazide (HYDRODIURIL) 25 MG tablet Take 0.5 tablets by mouth in the morning., Disp-30 tablet, R-5Normal      pantoprazole (PROTONIX) 20 MG tablet Take 1 tablet by mouth daily, Disp-30 tablet, R-3Normal      vitamin C (ASCORBIC ACID) 500 MG tablet Take 500 mg by mouth dailyHistorical Med      zinc 50 MG CAPS Take 50 mg by mouth dailyHistorical Med      vitamin E 1000 units capsule Take 1,000 Units by mouth dailyHistorical Med             ALLERGIES     Latex, Codeine, Morphine, Morphine and related, and Eggs or egg-derived products    FAMILY HISTORY       Family History   Problem Relation Age of Onset    Breast Cancer Mother 50    Colon Polyps Mother     Liver Cancer Mother     Heart Disease Mother     Cancer Mother     Diabetes Mother     Heart Disease Father     Cancer Father     Colon Polyps Maternal Grandmother     Breast Cancer Maternal Grandfather 46    Colon Polyps Paternal Grandfather     Liver Cancer Paternal Grandfather     Colon Cancer Neg Hx     Esophageal Cancer Neg Hx     Liver Disease Neg Hx     Rectal Cancer Neg Hx     Stomach Cancer Neg Hx           SOCIAL HISTORY       Social History     Socioeconomic History    Marital status:      Spouse name: None    Number of children: None    Years of education: None    Highest education level: None   Tobacco Use    Smoking status: Never    Smokeless tobacco: Never   Vaping Use    Vaping Use: Never used   Substance and Sexual Activity    Alcohol use: No    Drug use: No       SCREENINGS   NIH Stroke Scale  Interval: Reassessment  Level of Consciousness (1a): Alert  LOC Questions (1b): Answers both correctly  LOC Commands (1c): Performs both tasks correctly  Best Gaze (2): Normal  Visual (3): No visual loss  Facial Palsy (4): Normal symmetrical movement  Motor Arm, Left (5a): No drift  Motor Arm, Right (5b): No drift  Motor Leg, Left (6a): No drift  Motor Leg, Right (6b): No drift  Limb Ataxia (7): Absent  Sensory (8): Normal  Best Language (9): No aphasia  Dysarthria (10): Normal  Extinction and Inattention (11): No abnormality  Total: 0Glasgow Coma Scale  Eye Opening: Spontaneous  Best Verbal Response: Confused  Best Motor Response: Obeys commands  Lino Coma Scale Score: 14        PHYSICAL EXAM    (up to 7 for level 4, 8 or more for level 5)     ED Triage Vitals [01/27/23 1955]   BP Temp Temp Source Heart Rate Resp SpO2 Height Weight   (!) 172/99 98 °F (36.7 °C) Oral 75 13 99 % -- --       Physical Exam  Vitals and nursing note reviewed. Constitutional:       General: She is in acute distress. Comments: Patient appears confused and anxious. Patient is oriented x1 upon arrival but quickly improves during interview. HENT:      Head: Normocephalic and atraumatic. Right Ear: External ear normal.      Left Ear: External ear normal.      Nose: Nose normal. No congestion or rhinorrhea. Mouth/Throat:      Mouth: Mucous membranes are moist.      Pharynx: Oropharynx is clear. No oropharyngeal exudate or posterior oropharyngeal erythema. Eyes:      General: No scleral icterus. Conjunctiva/sclera: Conjunctivae normal.      Pupils: Pupils are equal, round, and reactive to light. Cardiovascular:      Rate and Rhythm: Normal rate and regular rhythm. Pulses: Normal pulses. Heart sounds: Normal heart sounds. No murmur heard. Pulmonary:      Effort: Pulmonary effort is normal. No respiratory distress. Breath sounds: Normal breath sounds. No stridor. No wheezing, rhonchi or rales.    Abdominal: General: Bowel sounds are normal. There is no distension. Palpations: Abdomen is soft. Tenderness: There is no abdominal tenderness. There is no guarding or rebound. Musculoskeletal:         General: No tenderness or deformity. Cervical back: Neck supple. No rigidity. Skin:     General: Skin is warm and dry. Capillary Refill: Capillary refill takes less than 2 seconds. Coloration: Skin is not jaundiced. Neurological:      General: No focal deficit present. Mental Status: She is alert and oriented to person, place, and time. Mental status is at baseline. Cranial Nerves: No cranial nerve deficit (Left-sided facial numbness. ). Sensory: Sensory deficit (Left-sided facial numbness) present. Motor: Weakness (Left-sided upper and lower extremity weakness. Pronator drift present but limb does not hit bed.) present. Coordination: Coordination normal.   Psychiatric:         Mood and Affect: Mood is anxious. Behavior: Behavior normal. Behavior is cooperative. Cognition and Memory: Cognition is impaired. She exhibits impaired recent memory. DIAGNOSTIC RESULTS     EKG: All EKG's areinterpreted by the Emergency Department Physician who either signs or Co-signs this chart in the absence of a cardiologist.    EKG dated 1/27/2023 at 20 8:21 PM: Normal sinus rhythm, rate 70. T wave flattening/inversion in V1 through V3.  , , QTc 473. RADIOLOGY:  Non-plain film images such as CT, Ultrasound and MRI are read by the radiologist. Plain radiographic images are visualized and preliminarily interpreted bythe emergency physician with the below findings:      MRI BRAIN W WO CONTRAST   Final Result   Negative head/brain MRI. Electronically signed by Love Sellers MD on 01-27-23 at 2332      XR CHEST PORTABLE   Final Result   No acute disease. Recommendation: Follow up as clinically indicated.     Dictated and Electronically Signed by Maxim Ricks MICHELINE LANDA at 28-Jan-2023 12:35:47 AM EST               CTA HEAD NECK W CONTRAST   Final Result   There is no evidence of hemodynamically significant abnormality in the neck arteries. Recommendation:   Follow up as clinically indicated. Exam: CTA OF THEINTRACRANIAL ARTERIES (COW)   Clinical data:Left sided weakness. Technique: Axial CT angiography of the intracranial arteries within the brain was performed with administration of intravenous contrast. Coronal, sagittal, and 3D volume reconstructions of theintracranial arteries were performed. Reformatted/3D-MPR    images were performed. Prior studies: No prior studies submitted. Findings:   No definite abnormality seen on 3D reformatted images. Anterior cerebral arteries demonstrate enhance normally. Anterior communicating artery is opacified. Middle Cerebral arteries are unremarkable. Posterior communicating arteries are opacified. Posterior cerebral arteries are intact. Vertebral arteries are visualized. Basilar artery is unremarkable. Intracranial internal carotid arteries demonstrate normal enhancement. No evidence of aneurysm (greater than 4 mm) orarteriovenous lesion. IMPRESSION: There is no evidence of hemodynamically significant abnormality in the intracranial arteries. Recommendation:   Follow up as clinically indicated. All CT scans at this facility utilize dose modulation, iterative reconstruction, and/or weight based dosing when appropriate to reduce radiation dose to as low as reasonably achievable. Dictated and Electronically Signed by Rodríguez William MD at 28-Jan-2023 12:27:56 AM EST               CT Head W/O Contrast   Final Result   1. Acute cortically based nonhemorrhagic infarct is suspected within the right parietal occipital lobe. Not present on prior MRI of the brain. Recommendation: Follow up as clinically indicated.    All CT scans at this facility utilize dose modulation, iterative reconstruction, and/or weight based dosing when appropriate to reduce radiation dose to as low as reasonably achievable. Amended by TYRON Burkett MD CERTIFIED at [id number] 10:04:19 PM EST   Dictated and Electronically Signed by TYRON Burkett MD CERTIFIED at [id number] 09:52:58 PM EST                       LABS:  Labs Reviewed   CBC WITH AUTO DIFFERENTIAL - Abnormal; Notable for the following components:       Result Value    RBC 5.52 (*)     Hemoglobin 16.9 (*)     Hematocrit 48.4 (*)     All other components within normal limits   COMPREHENSIVE METABOLIC PANEL - Abnormal; Notable for the following components:    ALT 36 (*)     All other components within normal limits   MAGNESIUM - Abnormal; Notable for the following components:    Magnesium 2.8 (*)     All other components within normal limits   TSH WITH REFLEX TO FT4 - Abnormal; Notable for the following components:    TSH Reflex FT4 5.54 (*)     All other components within normal limits   POCT GLUCOSE - Abnormal; Notable for the following components:    POC Glucose 126 (*)     All other components within normal limits   COVID-19, RAPID   DRUG SCRN, BUPRENORPHINE   TROPONIN   T4, FREE       All other labs were within normal range or not returned as of this dictation. EMERGENCY DEPARTMENT COURSE and DIFFERENTIAL DIAGNOSIS/MDM:   Vitals:    Vitals:    01/27/23 1955 01/27/23 2024 01/27/23 2202 01/28/23 0028   BP: (!) 172/99 (!) 152/91 125/75 113/85   Pulse: 75 70 67 64   Resp: 13 20 20 20   Temp: 98 °F (36.7 °C)      TempSrc: Oral      SpO2: 99% 97% 97% 98%       MDM     Amount and/or Complexity of Data Reviewed  Clinical lab tests: reviewed  Tests in the radiology section of CPT®: reviewed    53-year-old female with history of CVA in the past presents with left-sided numbness and weakness. NIHSS and Accu-Chek performed on arrival.  Lab, EKG, and radiology results reviewed. CT scan without contrast suggests a large parietal occipital stroke.   Contacted  Romel Sin, neurologist.  CT suggesting large stroke with hypoattenuation at less than 2 hours after definitive symptom onset is unlikely. Dr. Romel Sin recommends stat MRI before deciding on tPA administration. Patient begins to improve dramatically while awaiting MRI/read. tPA not given secondary to dramatic improvement of patient with return to normal neurologic status. MRI of head results are negative for acute stroke. Discussed with patient and  at bedside. Dr. Romel Sin offers to follow-up with patient as outpatient. Patient has appointment with neurologist at [facility]. Patient will also follow-up with JUAN JOSE Lowery, PCP. Patient will return with weakness, numbness, confusion, or other concerns. CONSULTS:  Dr. Romel Sin, neurology    PROCEDURES:  Unless otherwise noted below, none     Procedures    FINAL IMPRESSION      1.  TIA (transient ischemic attack)          DISPOSITION/PLAN   DISPOSITION Decision To Discharge 01/28/2023 12:12:29 AM      PATIENT REFERRED TO:  JUAN JOSE Gregorio  [address]  [phone]    Schedule an appointment as soon as possible for a visit         DISCHARGE MEDICATIONS:  Discharge Medication List as of 1/28/2023 12:42 AM             (Please note that portions of this note were completed with a voice recognition program.  Efforts were made to edit thedictations but occasionally words are mis-transcribed.)    Sheryle Piles, MD (electronically signed)  Attending Emergency Physician          Sheryle Piles, MD  02/01/23 0604

## 2023-02-14 ENCOUNTER — OFFICE VISIT (OUTPATIENT)
Dept: CARDIOLOGY CLINIC | Age: 53
End: 2023-02-14
Payer: COMMERCIAL

## 2023-02-14 VITALS
SYSTOLIC BLOOD PRESSURE: 126 MMHG | OXYGEN SATURATION: 99 % | DIASTOLIC BLOOD PRESSURE: 78 MMHG | HEART RATE: 69 BPM | BODY MASS INDEX: 24.99 KG/M2 | HEIGHT: 65 IN | WEIGHT: 150 LBS

## 2023-02-14 DIAGNOSIS — R07.9 CHEST PAIN, UNSPECIFIED TYPE: Primary | ICD-10-CM

## 2023-02-14 PROCEDURE — 3078F DIAST BP <80 MM HG: CPT | Performed by: INTERNAL MEDICINE

## 2023-02-14 PROCEDURE — 99205 OFFICE O/P NEW HI 60 MIN: CPT | Performed by: INTERNAL MEDICINE

## 2023-02-14 PROCEDURE — 3074F SYST BP LT 130 MM HG: CPT | Performed by: INTERNAL MEDICINE

## 2023-02-14 RX ORDER — METOPROLOL SUCCINATE 50 MG/1
50 TABLET, EXTENDED RELEASE ORAL DAILY
Qty: 30 TABLET | Refills: 5 | Status: SHIPPED | OUTPATIENT
Start: 2023-02-14

## 2023-02-14 NOTE — PROGRESS NOTES
Casper Horta is a 50 y.o. female referred from the nurse practitioner clinic for suspicion of TIA/CVA. She had a negative CT angiogram of her head and apparently a negative MRI. Her neurologist is not sure if this is a TIA versus a seizure. She have palpitations daily and on her Apple Watch her heart rates are occasionally to 140 and can stay there for 5 minutes. A ZIO monitor revealed frequent PACs but no atrial fibrillation. The patient reports she has chest pain when she has fluctuating heart rates. Review of Systems   Constitutional: Negative for fever, chills, diaphoresis, activity change, appetite change, fatigue and unexpected weight change. Eyes: Negative for photophobia, pain, redness and visual disturbance. Respiratory: Negative for apnea, cough, chest tightness, shortness of breath, wheezing and stridor. Cardiovascular: Negative for chest pain, palpitations and leg swelling. Gastrointestinal: Negative for abdominal distention. Genitourinary: Negative for dysuria, urgency and frequency. Musculoskeletal: Negative for myalgias, arthralgias and gait problem. Skin: Negative for color change, pallor, rash and wound. Neurological: Negative for dizziness, tremors, speech difficulty, weakness and numbness. Hematological: Does not bruise/bleed easily. Psychiatric/Behavioral: Negative.         Past Medical History:   Diagnosis Date    Allergic rhinitis 11/10/2021    Anxiety 6/4/2020    Asthma     Chronic back pain 7/29/2018    Chronic migraine without aura, intractable, without status migrainosus     Colon polyps     Depression 7/29/2018    Elevated blood pressure, situational 5/29/2018    Essential tremor     Fibromyalgia     Liver disease     Fatty Liver    Meniere disease 2017    Osteoarthritis     Sciatica of right side 6/24/2020    Tinnitus        Past Surgical History:   Procedure Laterality Date    [address], LAPAROSCOPIC  07/2015    Dr. Bridget Montoya COLONOSCOPY  2000?    ?     HYSTERECTOMY (CERVIX STATUS UNKNOWN)  1998    LIVER BIOPSY  07/2015    Dr. David Daly       Family History   Problem Relation Age of Onset    Breast Cancer Mother 50    Colon Polyps Mother     Liver Cancer Mother     Heart Disease Mother     Cancer Mother     Diabetes Mother     Heart Disease Father     Cancer Father     Colon Polyps Maternal Grandmother     Breast Cancer Maternal Grandfather 46    Colon Polyps Paternal Grandfather     Liver Cancer Paternal Grandfather     Colon Cancer Neg Hx     Esophageal Cancer Neg Hx     Liver Disease Neg Hx     Rectal Cancer Neg Hx     Stomach Cancer Neg Hx        Social History     Socioeconomic History    Marital status:      Spouse name: Not on file    Number of children: Not on file    Years of education: Not on file    Highest education level: Not on file   Occupational History    Not on file   Tobacco Use    Smoking status: Never    Smokeless tobacco: Never   Vaping Use    Vaping Use: Never used   Substance and Sexual Activity    Alcohol use: No    Drug use: No    Sexual activity: Not on file   Other Topics Concern    Not on file   Social History Narrative    Not on file     Social Determinants of Health     Financial Resource Strain: Not on file   Food Insecurity: Not on file   Transportation Needs: Not on file   Physical Activity: Not on file   Stress: Not on file   Social Connections: Not on file   Intimate Partner Violence: Not on file   Housing Stability: Not on file       Allergies   Allergen Reactions    Latex Rash, Itching, Swelling and Hives    Codeine Anaphylaxis    Morphine Anaphylaxis    Morphine And Related Anaphylaxis    Eggs Or Egg-Derived Products          Current Outpatient Medications:     Cholecalciferol (VITAMIN D) 50 MCG (2000 UT) CAPS capsule, Take by mouth, Disp: , Rfl:     metoprolol succinate (TOPROL XL) 25 MG extended release tablet, Take 1 tablet by mouth daily, Disp: 90 tablet, Rfl: 1    desvenlafaxine succinate (PRISTIQ) 50 MG TB24 extended release tablet, TAKE 1 TABLET BY MOUTH ONCE DAILY, Disp: 30 tablet, Rfl: 2    amitriptyline (ELAVIL) 25 MG tablet, Take 2 tablets by mouth nightly, Disp: 60 tablet, Rfl: 2    amLODIPine (NORVASC) 5 MG tablet, Take 1 tablet by mouth nightly, Disp: 30 tablet, Rfl: 3    rosuvastatin (CRESTOR) 40 MG tablet, Take 1 tablet by mouth nightly, Disp: 30 tablet, Rfl: 3    busPIRone (BUSPAR) 5 MG tablet, TAKE 1 TABLET BY MOUTH THREE TIMES DAILY AS NEEDED FOR ANXIETY, Disp: 90 tablet, Rfl: 1    aspirin 81 MG EC tablet, Take 1 tablet by mouth every evening, Disp: 30 tablet, Rfl: 5    clopidogrel (PLAVIX) 75 MG tablet, Take 1 tablet by mouth every evening, Disp: 30 tablet, Rfl: 5    fluticasone (FLONASE) 50 MCG/ACT nasal spray, 1 spray by Nasal route in the morning. (Patient taking differently: 1 spray by Nasal route as needed), Disp: 1 each, Rfl: 5    hydroCHLOROthiazide (HYDRODIURIL) 25 MG tablet, Take 0.5 tablets by mouth in the morning., Disp: 30 tablet, Rfl: 5    pantoprazole (PROTONIX) 20 MG tablet, Take 1 tablet by mouth daily, Disp: 30 tablet, Rfl: 3    vitamin C (ASCORBIC ACID) 500 MG tablet, Take 500 mg by mouth daily, Disp: , Rfl:     zinc 50 MG CAPS, Take 50 mg by mouth daily, Disp: , Rfl:     vitamin E 1000 units capsule, Take 1,000 Units by mouth daily, Disp: , Rfl:     PE:  Vitals:    02/14/23 1332   BP: 126/78   Pulse: 69   SpO2: 99%   Weight: 150 lb (68 kg)   Height: 5' 5\" (1.651 m)       Estimated body mass index is 24.96 kg/m² as calculated from the following:    Height as of this encounter: 5' 5\" (1.651 m). Weight as of this encounter: 150 lb (68 kg). Constitutional: She is oriented to person, place, and time. She appears well-developed and well-nourished in no acute distress. Neck:  Neck supple without JVD present. No trachea deviation present. No thyromegaly present. Eyes:Conjunctivae and EOM are normal. Pupils equal and reactive to light.    ENT:Hearing appears normal.conjunctiva and lids are normal, ears and nose appear normal.  Cardiovascular: Normal rate, S1-S2 regular rhythm, normal heart sounds. No murmur ascultated. No gallop and no friction rub. No carotid bruits. No peripheral edema. Pulmonary/Chest:  Lungs clear to auscultation bilaterally without evidence of respiratory distress. She without wheezes. She without rales or ronchi. Musculoskeletal: Normal range of motion. Gait is normal no assitive device. Head is normocephalic and atraumatic. Skin: Skin is warm and dry without rash or pallor. Psychiatric:She is alert and oriented to person, place, and time. She has a normal mood and affect. Her behavior is normal. Thought content normal.       Lab Results   Component Value Date/Time    CREATININE 0.7 01/27/2023 07:52 PM    CREATININE 0.6 12/15/2022 03:14 AM    CREATININE 0.7 12/14/2022 08:19 AM    HGB 16.9 01/27/2023 07:52 PM    HGB 13.3 12/15/2022 03:14 AM    HGB 15.0 12/14/2022 08:19 AM           ECG 02/14/23    Sinus rhythm with RSR prime in V1 and feminine pattern likely explains the poor R wave progression         Assessment, Recommendations, & Plan:  50 y.o. female with neurologic event concerning for TIA. If this is felt to be some sort of injury then I would recommend long-term monitoring for atrial fibrillation. Given her chest pain I will perform an exercise treadmill test and I will increase her metoprolol. She may follow back up in a month when she decides whether she wants the monitor based on the neurologic evaluation. Disposition - RTC in 1 months or sooner if needed      Please do not hesitate to contact me for any questions or concerns. Dr. Carrie James.  Joseph  Electrophysiology and Cardiology  [facility] [place], Cardiology  [phone]

## 2023-02-19 NOTE — PATIENT INSTRUCTIONS
OB Discharge Summary    Date:  2023    Name:  Kena Walsh  :  2004  MRN:  [id number]    Admission Date:  2023  Delivery Date:  2023  1:57 PM   Gestational Age at Delivery:  40w4d  Discharge Date:      Principal Diagnosis:    Problem List Items Addressed This Visit        Other     (normal spontaneous vaginal delivery) - Primary    Relevant Medications    Prenatal Vit-Fe Fumarate-FA (PRENATAL MULTIVITAMIN W/IRON) 27-0.8 MG tablet    Other Relevant Orders    Breast pump - Manual/Electric     Other Diagnosis:  None    Conditions complicating Pregnancy: None    Procedure(s) Performed:      Indication for Induction: N/A     Condition at Discharge:  good    Examination:  /85 (BP Location: Right arm)   Pulse 60   Temp 97.8  F (36.6  C) (Oral)   Resp 18   SpO2 100%   Breastfeeding Unknown    Gen: alert, normal interactions and behaviors  Cor: RRR  Lungs: Clear throughout  ABD: Fundus firm below umbilicus    Discharge Medications:      Review of your medicines      START taking      Dose / Directions   benzocaine-menthol 20-0.5 % Aero  Commonly known as: DERMOPLAST      Apply topically 4 times daily as needed (perineal pain)  Refills: 0     ibuprofen 800 MG tablet  Commonly known as: ADVIL/MOTRIN      Dose: 800 mg  Take 1 tablet (800 mg) by mouth once as needed for inflammatory pain (For mild to moderate pain.)  Refills: 0     lanolin ointment      Apply topically every hour as needed for other (sore nipples)  Refills: 0     witch hazel-glycerin pad  Commonly known as: TUCKS      Apply topically every hour as needed for hemorrhoids or other (episiotomy pain/itching)  Refills: 0        CONTINUE these medicines which may have CHANGED, or have new prescriptions. If we are uncertain of the size of tablets/capsules you have at home, strength may be listed as something that might have changed.      Dose / Directions   prenatal multivitamin w/iron 27-0.8 MG tablet  This may have  Patient Education        Learning About Low Back Pain  What is low back pain? Low back pain is pain that can occur anywhere below the ribs and above the legs. It is very common. Almost everyone has it at one time or another. Low back pain can be:  Acute. This is new pain that can last a few days to a few weeks-at the most a few months. Chronic. This pain can last for more than a few months. Sometimes it can last for years. What are some myths about low back pain? Here are some common myths about low back pain-and the facts:     Myths and facts about low back pain  Myth Fact   \"I need to rest my back when I have back pain. \" Staying active won't hurt you. It may help you get better faster. \"I need prescription pain medicine. \"  It's best to try to let time and being active heal your back. Opioid pain medicines-such as hydrocodone or oxycodone-usually don't work any better than over-the-counter medicines like ibuprofen or naproxen. And opioids can cause serious problems like addiction or overdose. \"I need a test like an X-ray or an MRI to diagnose my low back pain. \" Getting a test right away won't help you get better faster. And it could lead you down a treatment path you may not need, since most people get better on their own.      What causes low back pain? In most cases, there isn't a clear cause. This can be frustrating, because your back hurts and there's no obvious reason. Your back pain can be caused by:  Overuse or muscle strain. This can happen from playing sports, lifting heavy things, or not being physically fit. A herniated disc. This is a problem with the cushion between the bones in your back. Arthritis. With age, you may have changes in your bones that can narrow the space around your nerves. Other causes. In rare cases, the cause is a serious illness like an infection or cancer. But there are usually other symptoms too. What are the symptoms?   Your symptoms depend on your body changed:     medication strength    how much to take    when to take this      Dose: 1 tablet  Take 1 tablet by mouth daily  Quantity: 90 tablet  Refills: 3        CONTINUE these medicines which have NOT CHANGED      Dose / Directions   acetaminophen 500 MG tablet  Commonly known as: TYLENOL  Used for: Encounter for prenatal care of first pregnancy, third trimester      Dose: 500-1,000 mg  Take 1-2 tablets (500-1,000 mg) by mouth every 6 hours as needed for mild pain  Quantity: 200 tablet  Refills: 1        STOP taking    Alum hydroxide-Mag carbonate 160-105 MG Chew        doxylamine 25 MG Tabs tablet  Commonly known as: UNISOM        hydrOXYzine 25 MG tablet  Commonly known as: ATARAX              Where to get your medicines      These medications were sent to [id number] - [place], [place] - [address], [place] [address]    Phone: [phone]     prenatal multivitamin w/iron 27-0.8 MG tablet         Discharge Plan:    Follow up with /[facility]: 2 weeks then 6 weeks   Patient Instructions:      Physical activity: Regular    Diet:  Normal    Medication:  Ibuprofen as needed    Other:  Continue taking prenatal vitamin    Amber Bergeron MD  [facility] Medicine Resident.  Pager #: [phone]    Precepted patient with Dr. Melly Soto.

## 2023-03-01 ENCOUNTER — HOSPITAL ENCOUNTER (OUTPATIENT)
Dept: NON INVASIVE DIAGNOSTICS | Age: 53
Discharge: HOME | End: 2023-03-01
Payer: COMMERCIAL

## 2023-03-01 DIAGNOSIS — R07.9 CHEST PAIN, UNSPECIFIED TYPE: ICD-10-CM

## 2023-03-01 PROCEDURE — 93017 CV STRESS TEST TRACING ONLY: CPT

## 2023-03-03 DIAGNOSIS — I47.1 SVT (SUPRAVENTRICULAR TACHYCARDIA) (HCC): ICD-10-CM

## 2023-03-03 DIAGNOSIS — R07.9 CHEST PAIN, UNSPECIFIED TYPE: Primary | ICD-10-CM

## 2023-03-03 DIAGNOSIS — G45.9 TRANSIENT ISCHEMIC ATTACK (TIA): ICD-10-CM

## 2023-03-03 DIAGNOSIS — I63.9 CEREBROVASCULAR ACCIDENT (CVA), UNSPECIFIED MECHANISM (HCC): ICD-10-CM

## 2023-03-03 DIAGNOSIS — I10 ESSENTIAL HYPERTENSION: ICD-10-CM

## 2023-03-13 ENCOUNTER — OFFICE VISIT (OUTPATIENT)
Dept: PRIMARY CARE CLINIC | Age: 53
End: 2023-03-13

## 2023-03-13 VITALS
DIASTOLIC BLOOD PRESSURE: 84 MMHG | RESPIRATION RATE: 20 BRPM | HEIGHT: 65 IN | TEMPERATURE: 99 F | OXYGEN SATURATION: 99 % | SYSTOLIC BLOOD PRESSURE: 116 MMHG | BODY MASS INDEX: 25.49 KG/M2 | HEART RATE: 91 BPM | WEIGHT: 153 LBS

## 2023-03-13 DIAGNOSIS — J30.89 ALLERGIC RHINITIS DUE TO OTHER ALLERGIC TRIGGER, UNSPECIFIED SEASONALITY: ICD-10-CM

## 2023-03-13 DIAGNOSIS — E03.9 HYPOTHYROIDISM, UNSPECIFIED TYPE: ICD-10-CM

## 2023-03-13 DIAGNOSIS — G89.29 OTHER CHRONIC PAIN: ICD-10-CM

## 2023-03-13 DIAGNOSIS — I10 HYPERTENSION, ESSENTIAL, BENIGN: ICD-10-CM

## 2023-03-13 DIAGNOSIS — R60.0 BILATERAL LOWER EXTREMITY EDEMA: ICD-10-CM

## 2023-03-13 DIAGNOSIS — Z00.00 ANNUAL PHYSICAL EXAM: Primary | ICD-10-CM

## 2023-03-13 DIAGNOSIS — M54.40 CHRONIC BILATERAL LOW BACK PAIN WITH SCIATICA, SCIATICA LATERALITY UNSPECIFIED: ICD-10-CM

## 2023-03-13 DIAGNOSIS — F41.9 ANXIETY: ICD-10-CM

## 2023-03-13 DIAGNOSIS — G89.29 CHRONIC BILATERAL LOW BACK PAIN WITH SCIATICA, SCIATICA LATERALITY UNSPECIFIED: ICD-10-CM

## 2023-03-13 LAB
ALBUMIN SERPL-MCNC: 4.6 G/DL (ref 3.5–5.2)
ALP BLD-CCNC: 50 U/L (ref 35–104)
ALT SERPL-CCNC: 52 U/L (ref 5–33)
ANION GAP SERPL CALCULATED.3IONS-SCNC: 11 MMOL/L (ref 7–19)
AST SERPL-CCNC: 33 U/L (ref 5–32)
BASOPHILS ABSOLUTE: 0.1 K/UL (ref 0–0.2)
BASOPHILS RELATIVE PERCENT: 1.2 % (ref 0–1)
BILIRUB SERPL-MCNC: 0.3 MG/DL (ref 0.2–1.2)
BUN BLDV-MCNC: 8 MG/DL (ref 6–20)
CALCIUM SERPL-MCNC: 10 MG/DL (ref 8.6–10)
CHLORIDE BLD-SCNC: 102 MMOL/L (ref 98–111)
CO2: 26 MMOL/L (ref 22–29)
CREAT SERPL-MCNC: 0.6 MG/DL (ref 0.5–0.9)
EOSINOPHILS ABSOLUTE: 0.2 K/UL (ref 0–0.6)
EOSINOPHILS RELATIVE PERCENT: 2.6 % (ref 0–5)
GFR SERPL CREATININE-BSD FRML MDRD: >60 ML/MIN/{1.73_M2}
GLUCOSE BLD-MCNC: 120 MG/DL (ref 74–109)
HBA1C MFR BLD: 6.2 % (ref 4–6)
HCT VFR BLD CALC: 49.2 % (ref 37–47)
HEMOGLOBIN: 16.3 G/DL (ref 12–16)
IMMATURE GRANULOCYTES #: 0 K/UL
LYMPHOCYTES ABSOLUTE: 2.3 K/UL (ref 1.1–4.5)
LYMPHOCYTES RELATIVE PERCENT: 32.9 % (ref 20–40)
MCH RBC QN AUTO: 29.6 PG (ref 27–31)
MCHC RBC AUTO-ENTMCNC: 33.1 G/DL (ref 33–37)
MCV RBC AUTO: 89.5 FL (ref 81–99)
MONOCYTES ABSOLUTE: 0.7 K/UL (ref 0–0.9)
MONOCYTES RELATIVE PERCENT: 9.8 % (ref 0–10)
NEUTROPHILS ABSOLUTE: 3.7 K/UL (ref 1.5–7.5)
NEUTROPHILS RELATIVE PERCENT: 53.2 % (ref 50–65)
PDW BLD-RTO: 11.7 % (ref 11.5–14.5)
PLATELET # BLD: 321 K/UL (ref 130–400)
PMV BLD AUTO: 10.5 FL (ref 9.4–12.3)
POTASSIUM SERPL-SCNC: 4 MMOL/L (ref 3.5–5)
RBC # BLD: 5.5 M/UL (ref 4.2–5.4)
SODIUM BLD-SCNC: 139 MMOL/L (ref 136–145)
TOTAL PROTEIN: 7.6 G/DL (ref 6.6–8.7)
TSH SERPL DL<=0.05 MIU/L-ACNC: 2.38 UIU/ML (ref 0.27–4.2)
WBC # BLD: 6.9 K/UL (ref 4.8–10.8)

## 2023-03-13 RX ORDER — FLUTICASONE PROPIONATE 50 MCG
1 SPRAY, SUSPENSION (ML) NASAL DAILY
Qty: 1 EACH | Refills: 5 | Status: SHIPPED | OUTPATIENT
Start: 2023-03-13 | End: 2023-11-08

## 2023-03-13 RX ORDER — DESVENLAFAXINE 50 MG/1
TABLET, EXTENDED RELEASE ORAL
Qty: 30 TABLET | Refills: 2 | Status: SHIPPED | OUTPATIENT
Start: 2023-03-13

## 2023-03-13 RX ORDER — BUSPIRONE HYDROCHLORIDE 5 MG/1
TABLET ORAL
Qty: 90 TABLET | Refills: 1 | Status: SHIPPED | OUTPATIENT
Start: 2023-03-13

## 2023-03-13 RX ORDER — HYDROCHLOROTHIAZIDE 25 MG/1
25 TABLET ORAL DAILY
Qty: 30 TABLET | Refills: 5 | Status: SHIPPED | OUTPATIENT
Start: 2023-03-13 | End: 2023-04-12

## 2023-03-13 RX ORDER — PANTOPRAZOLE SODIUM 20 MG/1
20 TABLET, DELAYED RELEASE ORAL DAILY
Qty: 30 TABLET | Refills: 3 | Status: SHIPPED | OUTPATIENT
Start: 2023-03-13

## 2023-03-13 RX ORDER — HYDROCHLOROTHIAZIDE 25 MG/1
12.5 TABLET ORAL DAILY
Qty: 30 TABLET | Refills: 5 | Status: SHIPPED | OUTPATIENT
Start: 2023-03-13 | End: 2023-03-13

## 2023-03-13 RX ORDER — AMITRIPTYLINE HYDROCHLORIDE 25 MG/1
50 TABLET, FILM COATED ORAL NIGHTLY
Qty: 60 TABLET | Refills: 2 | Status: SHIPPED | OUTPATIENT
Start: 2023-03-13 | End: 2023-04-12

## 2023-03-13 SDOH — ECONOMIC STABILITY: INCOME INSECURITY: HOW HARD IS IT FOR YOU TO PAY FOR THE VERY BASICS LIKE FOOD, HOUSING, MEDICAL CARE, AND HEATING?: NOT HARD AT ALL

## 2023-03-13 SDOH — ECONOMIC STABILITY: FOOD INSECURITY: WITHIN THE PAST 12 MONTHS, YOU WORRIED THAT YOUR FOOD WOULD RUN OUT BEFORE YOU GOT MONEY TO BUY MORE.: NEVER TRUE

## 2023-03-13 SDOH — ECONOMIC STABILITY: FOOD INSECURITY: WITHIN THE PAST 12 MONTHS, THE FOOD YOU BOUGHT JUST DIDN'T LAST AND YOU DIDN'T HAVE MONEY TO GET MORE.: NEVER TRUE

## 2023-03-13 SDOH — ECONOMIC STABILITY: HOUSING INSECURITY
IN THE LAST 12 MONTHS, WAS THERE A TIME WHEN YOU DID NOT HAVE A STEADY PLACE TO SLEEP OR SLEPT IN A SHELTER (INCLUDING NOW)?: NO

## 2023-03-13 ASSESSMENT — ENCOUNTER SYMPTOMS
PHOTOPHOBIA: 0
BACK PAIN: 0
VOMITING: 0
COUGH: 0
NAUSEA: 0
COLOR CHANGE: 0
RHINORRHEA: 0
SHORTNESS OF BREATH: 0
VOICE CHANGE: 0

## 2023-03-13 NOTE — PROGRESS NOTES
[facility] [address] [facility]  [address] [id number] [address] [address]  Dept: [phone]  Dept Fax: [phone]  Loc: [phone]    Cally Murillo is a 48 y.o. female who presents today for her medical conditions/complaints as noted below. Cally Murillo is c/o of Established New Doctor        HPI:     HPI   Chief Complaint   Patient presents with    Established New Doctor     Patient presents today to establish care with new provider/annual physical. She has history of anxiety/depression, hypertension, hyperlipidemia, GERD. 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 She sees neuro at [address] [place] [address] and Dr Inga Isaac cardiology here at [facility]. She is scheduled for ECHO. TSH was elevated when she was hospitalized 2 months ago; she is not currently taking levothyroxine. She complains of retaining fluid at the end of the day in her feet/legs. She is currently taking 12.5 HCTZ. She is due for colonoscopy; she has this scheduled for next month. Past Medical History:   Diagnosis Date    Allergic rhinitis 11/10/2021    Anxiety 2020    Asthma     Chronic back pain 2018    Chronic migraine without aura, intractable, without status migrainosus     Colon polyps     Depression 2018    Elevated blood pressure, situational 2018    Essential tremor     Fibromyalgia     Liver disease     Fatty Liver    Meniere disease 2017    Osteoarthritis     Sciatica of right side 2020    Tinnitus       Past Surgical History:   Procedure Laterality Date     SECTION      CHOLECYSTECTOMY, LAPAROSCOPIC  2015    Dr. Mariluz Sánchez  ?    ?     HYSTERECTOMY (CERVIX STATUS UNKNOWN)  1998    LIVER BIOPSY  2015    Dr. Chayito Osborne 3/13/2023 2023 2023 2023 2023    SYSTOLIC 367 684 344 158 594 219   DIASTOLIC 84 78 85 75 91 99   Pulse 91 69 64 67 70 75   Temp 99 - - - - 98   Resp 20 - 20 20 20 13   SpO2 99 99 98 97 97 99   Weight 153 lb 150 lb - - - -   Height 5' 5\" 5' 5\" - - - -   Body mass index 25.46 kg/m2 24.96 kg/m2 - - - -   Some recent data might be hidden       Family History   Problem Relation Age of Onset    Breast Cancer Mother 50    Colon Polyps Mother     Liver Cancer Mother     Heart Disease Mother     Cancer Mother     Diabetes Mother     Heart Disease Father     Cancer Father     Colon Polyps Maternal Grandmother     Breast Cancer Maternal Grandfather 46    Colon Polyps Paternal Grandfather     Liver Cancer Paternal Grandfather     Colon Cancer Neg Hx     Esophageal Cancer Neg Hx     Liver Disease Neg Hx     Rectal Cancer Neg Hx     Stomach Cancer Neg Hx        Social History     Tobacco Use    Smoking status: Never    Smokeless tobacco: Never   Substance Use Topics    Alcohol use: No      Current Outpatient Medications on File Prior to Visit   Medication Sig Dispense Refill    metoprolol succinate (TOPROL XL) 50 MG extended release tablet Take 1 tablet by mouth daily 30 tablet 5    Cholecalciferol (VITAMIN D) 50 MCG (2000 UT) CAPS capsule Take by mouth      amLODIPine (NORVASC) 5 MG tablet Take 1 tablet by mouth nightly 30 tablet 3    rosuvastatin (CRESTOR) 40 MG tablet Take 1 tablet by mouth nightly 30 tablet 3    aspirin 81 MG EC tablet Take 1 tablet by mouth every evening 30 tablet 5    clopidogrel (PLAVIX) 75 MG tablet Take 1 tablet by mouth every evening 30 tablet 5    vitamin C (ASCORBIC ACID) 500 MG tablet Take 500 mg by mouth daily      zinc 50 MG CAPS Take 50 mg by mouth daily      vitamin E 1000 units capsule Take 1,000 Units by mouth daily       No current facility-administered medications on file prior to visit.      Allergies   Allergen Reactions    Latex Rash, Itching, Swelling and Hives    Codeine Anaphylaxis    Morphine Anaphylaxis    Morphine And Related Anaphylaxis    Eggs Or Egg-Derived Products        Health Maintenance   Topic Date Due    DTaP/Tdap/Td vaccine (1 - Tdap) Never done Colorectal Cancer Screen  Never done    Shingles vaccine (1 of 2) Never done    Flu vaccine (1) 08/01/2022    HIV screen  03/29/2023 (Originally 3/8/1985)    COVID-19 Vaccine (1) 01/31/2025 (Originally 1970)    Depression Monitoring  07/28/2023    Breast cancer screen  08/02/2023    A1C test (Diabetic or Prediabetic)  12/15/2023    Lipids  12/15/2023    Pneumococcal 0-64 years Vaccine  Completed    Hepatitis C screen  Completed    Hepatitis A vaccine  Aged Out    Hib vaccine  Aged Out    Meningococcal (ACWY) vaccine  Aged Out       Subjective:      Review of Systems   Constitutional:  Negative for chills and fever. HENT:  Negative for ear pain, hearing loss, rhinorrhea and voice change. Eyes:  Negative for photophobia and visual disturbance. Respiratory:  Negative for cough and shortness of breath. Cardiovascular:  Negative for chest pain and palpitations. Gastrointestinal:  Negative for nausea and vomiting. Endocrine: Negative. Negative for cold intolerance and heat intolerance. Genitourinary:  Negative for difficulty urinating and flank pain. Musculoskeletal:  Negative for back pain and neck pain. Skin:  Negative for color change and rash. Allergic/Immunologic: Negative for environmental allergies and food allergies. Neurological:  Negative for dizziness, speech difficulty and headaches. Hematological:  Does not bruise/bleed easily. Psychiatric/Behavioral:  Negative for sleep disturbance and suicidal ideas. Objective:     Physical Exam  Vitals and nursing note reviewed. Constitutional:       Appearance: She is well-developed. HENT:      Head: Atraumatic. Right Ear: External ear normal.      Left Ear: External ear normal.      Nose: Nose normal.   Eyes:      Conjunctiva/sclera: Conjunctivae normal.      Pupils: Pupils are equal, round, and reactive to light. Cardiovascular:      Rate and Rhythm: Normal rate and regular rhythm.       Heart sounds: Normal heart sounds, S1 normal and S2 normal.   Pulmonary:      Effort: Pulmonary effort is normal.      Breath sounds: Normal breath sounds. Abdominal:      General: Bowel sounds are normal.      Palpations: Abdomen is soft. Musculoskeletal:         General: Normal range of motion. Cervical back: Normal range of motion and neck supple. Skin:     General: Skin is warm and dry. Neurological:      Mental Status: She is alert and oriented to person, place, and time. Psychiatric:         Behavior: Behavior normal.     /84   Pulse 91   Temp 99 °F (37.2 °C) (Temporal)   Resp 20   Ht 5' 5\" (1.651 m)   Wt 153 lb (69.4 kg)   SpO2 99%   BMI 25.46 kg/m²     Assessment:       Diagnosis Orders   1. Annual physical exam        2. Chronic bilateral low back pain with sciatica, sciatica laterality unspecified  desvenlafaxine succinate (PRISTIQ) 50 MG TB24 extended release tablet    amitriptyline (ELAVIL) 25 MG tablet      3. Other chronic pain  amitriptyline (ELAVIL) 25 MG tablet      4. Anxiety  busPIRone (BUSPAR) 5 MG tablet      5. Allergic rhinitis due to other allergic trigger, unspecified seasonality  fluticasone (FLONASE) 50 MCG/ACT nasal spray      6. Hypertension, essential, benign  CBC with Auto Differential    Comprehensive Metabolic Panel    Hemoglobin A1C    TSH    hydroCHLOROthiazide (HYDRODIURIL) 25 MG tablet    DISCONTINUED: hydroCHLOROthiazide (HYDRODIURIL) 25 MG tablet      7. Hypothyroidism, unspecified type        8. Bilateral lower extremity edema              Plan:     Labs today in [address]. Follow-up in 6 months or sooner if needed. Keep all appointments with specialists as scheduled.        PDMP Monitoring:    Last PDMP Nirmala Pollock as Reviewed:  Review User Review Instant Review Result            Urine Drug Screenings (1 yr)       POCT Rapid Drug Screen  Collected: 4/26/2019  8:58 AM (Final result)              Urine Drug Screen  Collected: 5/9/2022 11:52 AM (Final result) Medication Contract and Consent for Opioid Use Documents Filed       Patient Documents       Type of Document Status Date Received Received By Description    Medication Contract Received 2019  3:11 PM ALICIA SR Medication consent                      Patient given educational materials -see patient instructions. Discussed use, benefit, and side effects of prescribed medications. All patient questions answered. Pt voiced understanding. Reviewed health maintenance. Instructed to continue currentmedications, diet and exercise. Patient agreed with treatment plan. Follow up as directed. MEDICATIONS:  Orders Placed This Encounter   Medications    desvenlafaxine succinate (PRISTIQ) 50 MG TB24 extended release tablet     Sig: TAKE 1 TABLET BY MOUTH ONCE DAILY     Dispense:  30 tablet     Refill:  2    amitriptyline (ELAVIL) 25 MG tablet     Sig: Take 2 tablets by mouth nightly     Dispense:  60 tablet     Refill:  2    busPIRone (BUSPAR) 5 MG tablet     Sig: TAKE 1 TABLET BY MOUTH THREE TIMES DAILY AS NEEDED FOR ANXIETY     Dispense:  90 tablet     Refill:  1    fluticasone (FLONASE) 50 MCG/ACT nasal spray     Si spray by Nasal route daily     Dispense:  1 each     Refill:  5    DISCONTD: hydroCHLOROthiazide (HYDRODIURIL) 25 MG tablet     Sig: Take 0.5 tablets by mouth daily     Dispense:  30 tablet     Refill:  5    pantoprazole (PROTONIX) 20 MG tablet     Sig: Take 1 tablet by mouth daily     Dispense:  30 tablet     Refill:  3    hydroCHLOROthiazide (HYDRODIURIL) 25 MG tablet     Sig: Take 1 tablet by mouth daily     Dispense:  30 tablet     Refill:  5         ORDERS:  Orders Placed This Encounter   Procedures    CBC with Auto Differential    Comprehensive Metabolic Panel    Hemoglobin A1C    TSH       Follow-up:  No follow-ups on file. PATIENT INSTRUCTIONS:  Patient Instructions   Labs today in [address]. Follow-up in 6 months or sooner if needed.    Keep all appointments with specialists as scheduled. Electronically signed by JUAN JOSE Dobson on 3/13/2023 at 10:14 AM    EMR Dragon/transcription disclaimer:  Much of thisencounter note is electronic transcription/translation of spoken language to printed texts. The electronic translation of spoken language may be erroneous, or at times, nonsensical words or phrases may be inadvertentlytranscribed.   Although I have reviewed the note for such errors, some may still exist.

## 2023-03-13 NOTE — PATIENT INSTRUCTIONS
Labs today in [address]. Follow-up in 6 months or sooner if needed. Keep all appointments with specialists as scheduled.

## 2023-03-24 ENCOUNTER — HOSPITAL ENCOUNTER (OUTPATIENT)
Dept: NUCLEAR MEDICINE | Age: 53
Discharge: HOME | End: 2023-03-26
Payer: COMMERCIAL

## 2023-03-24 DIAGNOSIS — G45.9 TRANSIENT ISCHEMIC ATTACK (TIA): ICD-10-CM

## 2023-03-24 DIAGNOSIS — R07.9 CHEST PAIN, UNSPECIFIED TYPE: ICD-10-CM

## 2023-03-24 DIAGNOSIS — I10 ESSENTIAL HYPERTENSION: ICD-10-CM

## 2023-03-24 DIAGNOSIS — I63.9 CEREBROVASCULAR ACCIDENT (CVA), UNSPECIFIED MECHANISM (HCC): ICD-10-CM

## 2023-03-24 DIAGNOSIS — I47.1 SVT (SUPRAVENTRICULAR TACHYCARDIA) (HCC): ICD-10-CM

## 2023-03-24 LAB
LV EF: 85 %
LVEF MODALITY: NORMAL

## 2023-03-24 PROCEDURE — 6360000002 HC RX W HCPCS: Performed by: INTERNAL MEDICINE

## 2023-03-24 PROCEDURE — 78452 HT MUSCLE IMAGE SPECT MULT: CPT

## 2023-03-24 PROCEDURE — 93016 CV STRESS TEST SUPVJ ONLY: CPT | Performed by: INTERNAL MEDICINE

## 2023-03-24 PROCEDURE — 3430000000 HC RX DIAGNOSTIC RADIOPHARMACEUTICAL: Performed by: INTERNAL MEDICINE

## 2023-03-24 PROCEDURE — 93018 CV STRESS TEST I&R ONLY: CPT | Performed by: INTERNAL MEDICINE

## 2023-03-24 PROCEDURE — A9502 TC99M TETROFOSMIN: HCPCS | Performed by: INTERNAL MEDICINE

## 2023-03-24 PROCEDURE — 78452 HT MUSCLE IMAGE SPECT MULT: CPT | Performed by: INTERNAL MEDICINE

## 2023-03-24 RX ADMIN — TETROFOSMIN 24 MILLICURIE: 1.38 INJECTION, POWDER, LYOPHILIZED, FOR SOLUTION INTRAVENOUS at 13:10

## 2023-03-24 RX ADMIN — REGADENOSON 0.4 MG: 0.08 INJECTION, SOLUTION INTRAVENOUS at 12:17

## 2023-03-24 RX ADMIN — TETROFOSMIN 8 MILLICURIE: 1.38 INJECTION, POWDER, LYOPHILIZED, FOR SOLUTION INTRAVENOUS at 13:11

## 2023-03-28 ENCOUNTER — OFFICE VISIT (OUTPATIENT)
Dept: CARDIOLOGY CLINIC | Age: 53
End: 2023-03-28
Payer: COMMERCIAL

## 2023-03-28 VITALS
HEIGHT: 65 IN | OXYGEN SATURATION: 100 % | HEART RATE: 65 BPM | DIASTOLIC BLOOD PRESSURE: 72 MMHG | SYSTOLIC BLOOD PRESSURE: 110 MMHG | WEIGHT: 152 LBS | BODY MASS INDEX: 25.33 KG/M2

## 2023-03-28 DIAGNOSIS — R00.2 PALPITATION: Primary | ICD-10-CM

## 2023-03-28 PROCEDURE — 3074F SYST BP LT 130 MM HG: CPT | Performed by: INTERNAL MEDICINE

## 2023-03-28 PROCEDURE — 99213 OFFICE O/P EST LOW 20 MIN: CPT | Performed by: INTERNAL MEDICINE

## 2023-03-28 PROCEDURE — 3078F DIAST BP <80 MM HG: CPT | Performed by: INTERNAL MEDICINE

## 2023-03-28 NOTE — PROGRESS NOTES
turned out not to be the case and therefore I would not perform an implantable loop recorder for questionable atrial fibrillation. She did have PACs and short runs of SVT but she is completely asymptomatic with this. I will leave it open-ended and she will call me if she needs anything in the future. Disposition - RTC as needed for symptoms. Please do not hesitate to contact me for any questions or concerns. Dr. Carrie James.  [place]  Electrophysiology and Cardiology  [facility] [place] [place] [facility] [place], Cardiology  [phone]

## 2023-05-04 DIAGNOSIS — I10 HYPERTENSION, ESSENTIAL, BENIGN: ICD-10-CM

## 2023-05-04 RX ORDER — AMLODIPINE BESYLATE 5 MG/1
TABLET ORAL
Qty: 30 TABLET | Refills: 3 | Status: SHIPPED | OUTPATIENT
Start: 2023-05-04

## 2023-05-22 RX ORDER — ROSUVASTATIN CALCIUM 40 MG/1
TABLET, COATED ORAL
Qty: 30 TABLET | Refills: 3 | Status: SHIPPED | OUTPATIENT
Start: 2023-05-22

## 2023-07-29 DIAGNOSIS — M54.40 CHRONIC BILATERAL LOW BACK PAIN WITH SCIATICA, SCIATICA LATERALITY UNSPECIFIED: ICD-10-CM

## 2023-07-29 DIAGNOSIS — G89.29 CHRONIC BILATERAL LOW BACK PAIN WITH SCIATICA, SCIATICA LATERALITY UNSPECIFIED: ICD-10-CM

## 2023-07-30 DIAGNOSIS — F41.9 ANXIETY: ICD-10-CM

## 2023-07-30 DIAGNOSIS — G45.9 TIA (TRANSIENT ISCHEMIC ATTACK): ICD-10-CM

## 2023-07-30 DIAGNOSIS — G89.29 CHRONIC BILATERAL LOW BACK PAIN WITH SCIATICA, SCIATICA LATERALITY UNSPECIFIED: ICD-10-CM

## 2023-07-30 DIAGNOSIS — M54.40 CHRONIC BILATERAL LOW BACK PAIN WITH SCIATICA, SCIATICA LATERALITY UNSPECIFIED: ICD-10-CM

## 2023-07-31 RX ORDER — DESVENLAFAXINE SUCCINATE 50 MG/1
TABLET, EXTENDED RELEASE ORAL
Qty: 30 TABLET | Refills: 2 | OUTPATIENT
Start: 2023-07-31

## 2023-07-31 RX ORDER — PANTOPRAZOLE SODIUM 20 MG/1
20 TABLET, DELAYED RELEASE ORAL DAILY
Qty: 30 TABLET | Refills: 3 | OUTPATIENT
Start: 2023-07-31

## 2023-07-31 RX ORDER — PANTOPRAZOLE SODIUM 20 MG/1
TABLET, DELAYED RELEASE ORAL
Qty: 30 TABLET | Refills: 3 | Status: SHIPPED | OUTPATIENT
Start: 2023-07-31

## 2023-07-31 RX ORDER — CLOPIDOGREL BISULFATE 75 MG/1
75 TABLET ORAL EVERY EVENING
Qty: 30 TABLET | Refills: 5 | Status: SHIPPED | OUTPATIENT
Start: 2023-07-31 | End: 2023-08-30

## 2023-07-31 RX ORDER — BUSPIRONE HYDROCHLORIDE 5 MG/1
TABLET ORAL
Qty: 90 TABLET | Refills: 1 | Status: SHIPPED | OUTPATIENT
Start: 2023-07-31

## 2023-07-31 RX ORDER — DESVENLAFAXINE SUCCINATE 50 MG/1
TABLET, EXTENDED RELEASE ORAL
Qty: 30 TABLET | Refills: 2 | Status: SHIPPED | OUTPATIENT
Start: 2023-07-31

## 2023-08-24 DIAGNOSIS — G45.9 TIA (TRANSIENT ISCHEMIC ATTACK): ICD-10-CM

## 2023-08-24 NOTE — TELEPHONE ENCOUNTER
Yana Walsh called requesting a refill of the below medication which has been pended for you:     Requested Prescriptions     Pending Prescriptions Disp Refills    ASPIRIN LOW DOSE 81 MG EC tablet [Pharmacy Med Name: ASPIRIN LOW DOSE 81MG TABLET DELAYED RELEASE] 30 tablet 3     Sig: TAKE 1 TABLET BY MOUTH EVERY EVENING       Last Appointment Date: 3/13/2023  Next Appointment Date: 9/18/2023    Allergies   Allergen Reactions    Latex Rash, Itching, Swelling and Hives    Codeine Anaphylaxis    Morphine Anaphylaxis    Morphine And Related Anaphylaxis    Eggs Or Egg-Derived Products

## 2023-08-25 RX ORDER — ASPIRIN 81 MG/1
TABLET, COATED ORAL
Qty: 30 TABLET | Refills: 3 | Status: SHIPPED | OUTPATIENT
Start: 2023-08-25

## 2023-09-08 DIAGNOSIS — I10 HYPERTENSION, ESSENTIAL, BENIGN: ICD-10-CM

## 2023-09-08 RX ORDER — AMLODIPINE BESYLATE 5 MG/1
TABLET ORAL
Qty: 30 TABLET | Refills: 0 | Status: SHIPPED | OUTPATIENT
Start: 2023-09-08 | End: 2023-10-09

## 2023-09-26 RX ORDER — ROSUVASTATIN CALCIUM 40 MG/1
TABLET, COATED ORAL
Qty: 30 TABLET | Refills: 3 | Status: SHIPPED | OUTPATIENT
Start: 2023-09-26

## 2023-10-09 DIAGNOSIS — I10 HYPERTENSION, ESSENTIAL, BENIGN: ICD-10-CM

## 2023-10-09 RX ORDER — METOPROLOL SUCCINATE 50 MG/1
50 TABLET, EXTENDED RELEASE ORAL DAILY
Qty: 30 TABLET | Refills: 5 | Status: SHIPPED | OUTPATIENT
Start: 2023-10-09

## 2023-10-09 RX ORDER — AMLODIPINE BESYLATE 5 MG/1
TABLET ORAL
Qty: 30 TABLET | Refills: 0 | Status: SHIPPED | OUTPATIENT
Start: 2023-10-09

## 2023-10-16 DIAGNOSIS — G89.29 CHRONIC BILATERAL LOW BACK PAIN WITH SCIATICA, SCIATICA LATERALITY UNSPECIFIED: ICD-10-CM

## 2023-10-16 DIAGNOSIS — M54.40 CHRONIC BILATERAL LOW BACK PAIN WITH SCIATICA, SCIATICA LATERALITY UNSPECIFIED: ICD-10-CM

## 2023-10-16 DIAGNOSIS — G89.29 OTHER CHRONIC PAIN: ICD-10-CM

## 2023-10-16 RX ORDER — AMITRIPTYLINE HYDROCHLORIDE 25 MG/1
50 TABLET, FILM COATED ORAL NIGHTLY
Qty: 14 TABLET | Refills: 0 | Status: SHIPPED | OUTPATIENT
Start: 2023-10-16 | End: 2023-10-23

## 2023-10-16 NOTE — TELEPHONE ENCOUNTER
Martín Almonte called requesting a refill of the below medication which has been pended for you: Only 7 day supply. Must have appointment for future refills. Requested Prescriptions     Pending Prescriptions Disp Refills    amitriptyline (ELAVIL) 25 MG tablet [Pharmacy Med Name: AMITRIPTYLINE HYDROCHLORIDE 25MG TABLET] 14 tablet 0     Sig: Take 2 tablets by mouth nightly for 7 days MUST HAVE APPOINTMENT FOR FUTURE REFILLS.        Last Appointment Date: 3/13/2023  Next Appointment Date: Visit date not found    Allergies   Allergen Reactions    Latex Rash, Itching, Swelling and Hives    Codeine Anaphylaxis    Morphine Anaphylaxis    Morphine And Related Anaphylaxis    Eggs Or Egg-Derived Products

## 2023-10-23 ENCOUNTER — TELEMEDICINE (OUTPATIENT)
Dept: PRIMARY CARE CLINIC | Age: 53
End: 2023-10-23

## 2023-10-23 DIAGNOSIS — Z12.31 ENCOUNTER FOR SCREENING MAMMOGRAM FOR BREAST CANCER: ICD-10-CM

## 2023-10-23 DIAGNOSIS — E78.2 MIXED HYPERLIPIDEMIA: ICD-10-CM

## 2023-10-23 DIAGNOSIS — E03.9 HYPOTHYROIDISM, UNSPECIFIED TYPE: ICD-10-CM

## 2023-10-23 DIAGNOSIS — I10 PRIMARY HYPERTENSION: ICD-10-CM

## 2023-10-23 DIAGNOSIS — F41.9 ANXIETY: Primary | ICD-10-CM

## 2023-10-23 PROCEDURE — 99213 OFFICE O/P EST LOW 20 MIN: CPT | Performed by: NURSE PRACTITIONER

## 2023-10-23 ASSESSMENT — ENCOUNTER SYMPTOMS
VOICE CHANGE: 0
VOMITING: 0
PHOTOPHOBIA: 0
NAUSEA: 0
COLOR CHANGE: 0
SHORTNESS OF BREATH: 0
BACK PAIN: 0
RHINORRHEA: 0
COUGH: 0

## 2023-10-23 NOTE — PROGRESS NOTES
Allyssa Adrian, was evaluated through a synchronous (real-time) audio-video encounter. The patient (or guardian if applicable) is aware that this is a billable service, which includes applicable co-pays. This Virtual Visit was conducted with patient's (and/or legal guardian's) consent. Patient identification was verified, and a caregiver was present when appropriate. The patient was located at Home: [address] [place] [id number]  Provider was located at Home ([facility]): [address] [facility] (:  1970) is a Established patient, presenting virtually for evaluation of the followin [address]   Below is the assessment and plan developed based on review of pertinent history, physical exam, labs, studies, and medications. 1. Anxiety  -     CBC with Auto Differential; Future  -     Comprehensive Metabolic Panel; Future  -     Lipid Panel; Future  -     Hemoglobin A1C; Future  -     Vitamin D 25 Hydroxy; Future  -     Urinalysis with Reflex to Culture; Future  -     TSH; Future  2. Hypothyroidism, unspecified type  -     CBC with Auto Differential; Future  -     Comprehensive Metabolic Panel; Future  -     Lipid Panel; Future  -     Hemoglobin A1C; Future  -     Vitamin D 25 Hydroxy; Future  -     Urinalysis with Reflex to Culture; Future  -     TSH; Future  3. Primary hypertension  -     CBC with Auto Differential; Future  -     Comprehensive Metabolic Panel; Future  -     Lipid Panel; Future  -     Hemoglobin A1C; Future  -     Vitamin D 25 Hydroxy; Future  -     Urinalysis with Reflex to Culture; Future  -     TSH; Future  4. Mixed hyperlipidemia  -     CBC with Auto Differential; Future  -     Comprehensive Metabolic Panel; Future  -     Lipid Panel; Future  -     Hemoglobin A1C; Future  -     Vitamin D 25 Hydroxy; Future  -     Urinalysis with Reflex to Culture; Future  -     TSH; Future    No follow-ups on file.        Subjective   Patient presents today for follow-up anxiety,

## 2023-10-30 DIAGNOSIS — I10 PRIMARY HYPERTENSION: ICD-10-CM

## 2023-10-30 DIAGNOSIS — F41.9 ANXIETY: ICD-10-CM

## 2023-10-30 DIAGNOSIS — E03.9 HYPOTHYROIDISM, UNSPECIFIED TYPE: ICD-10-CM

## 2023-10-30 DIAGNOSIS — E78.2 MIXED HYPERLIPIDEMIA: ICD-10-CM

## 2023-10-30 LAB
25(OH)D3 SERPL-MCNC: 96.3 NG/ML
ALBUMIN SERPL-MCNC: 4.7 G/DL (ref 3.5–5.2)
ALP SERPL-CCNC: 45 U/L (ref 35–104)
ALT SERPL-CCNC: 60 U/L (ref 5–33)
ANION GAP SERPL CALCULATED.3IONS-SCNC: 11 MMOL/L (ref 7–19)
AST SERPL-CCNC: 37 U/L (ref 5–32)
BACTERIA #/AREA URNS HPF: ABNORMAL /HPF
BASOPHILS # BLD: 0.1 K/UL (ref 0–0.2)
BASOPHILS NFR BLD: 1.4 % (ref 0–1)
BILIRUB SERPL-MCNC: 0.4 MG/DL (ref 0.2–1.2)
BILIRUB UR QL STRIP: ABNORMAL
BUN SERPL-MCNC: 9 MG/DL (ref 6–20)
CALCIUM SERPL-MCNC: 9.4 MG/DL (ref 8.6–10)
CHLORIDE SERPL-SCNC: 102 MMOL/L (ref 98–111)
CHOLEST SERPL-MCNC: 148 MG/DL (ref 160–199)
CLARITY UR: ABNORMAL
CO2 SERPL-SCNC: 29 MMOL/L (ref 22–29)
COLOR UR: ABNORMAL
CREAT SERPL-MCNC: 0.8 MG/DL (ref 0.5–0.9)
CRYSTALS URNS MICRO: ABNORMAL /HPF
EOSINOPHIL # BLD: 0.2 K/UL (ref 0–0.6)
EOSINOPHIL NFR BLD: 2.4 % (ref 0–5)
ERYTHROCYTE [DISTWIDTH] IN BLOOD BY AUTOMATED COUNT: 12.2 % (ref 11.5–14.5)
GLUCOSE SERPL-MCNC: 130 MG/DL (ref 74–109)
GLUCOSE UR STRIP.AUTO-MCNC: NEGATIVE MG/DL
HBA1C MFR BLD: 6.9 % (ref 4–6)
HCT VFR BLD AUTO: 47.3 % (ref 37–47)
HDLC SERPL-MCNC: 33 MG/DL (ref 65–121)
HGB BLD-MCNC: 15.8 G/DL (ref 12–16)
HGB UR STRIP.AUTO-MCNC: NEGATIVE MG/L
IMM GRANULOCYTES # BLD: 0 K/UL
KETONES UR STRIP.AUTO-MCNC: ABNORMAL MG/DL
LDLC SERPL CALC-MCNC: 49 MG/DL
LEUKOCYTE ESTERASE UR QL STRIP.AUTO: ABNORMAL
LYMPHOCYTES # BLD: 2.5 K/UL (ref 1.1–4.5)
LYMPHOCYTES NFR BLD: 38.3 % (ref 20–40)
MCH RBC QN AUTO: 29.7 PG (ref 27–31)
MCHC RBC AUTO-ENTMCNC: 33.4 G/DL (ref 33–37)
MCV RBC AUTO: 88.9 FL (ref 81–99)
MONOCYTES # BLD: 0.6 K/UL (ref 0–0.9)
MONOCYTES NFR BLD: 9.7 % (ref 0–10)
NEUTROPHILS # BLD: 3.2 K/UL (ref 1.5–7.5)
NEUTS SEG NFR BLD: 48 % (ref 50–65)
NITRITE UR QL STRIP.AUTO: NEGATIVE
PH UR STRIP.AUTO: 6 [PH] (ref 5–8)
PLATELET # BLD AUTO: 366 K/UL (ref 130–400)
PMV BLD AUTO: 10.8 FL (ref 9.4–12.3)
POTASSIUM SERPL-SCNC: 3.8 MMOL/L (ref 3.5–5)
PROT SERPL-MCNC: 7.3 G/DL (ref 6.6–8.7)
PROT UR STRIP.AUTO-MCNC: ABNORMAL MG/DL
RBC # BLD AUTO: 5.32 M/UL (ref 4.2–5.4)
RBC #/AREA URNS HPF: ABNORMAL /HPF (ref 0–2)
SODIUM SERPL-SCNC: 142 MMOL/L (ref 136–145)
SP GR UR STRIP.AUTO: 1.03 (ref 1–1.03)
SQUAMOUS #/AREA URNS HPF: ABNORMAL /HPF
TRIGL SERPL-MCNC: 329 MG/DL (ref 0–149)
TSH SERPL DL<=0.005 MIU/L-ACNC: 5.62 UIU/ML (ref 0.27–4.2)
UROBILINOGEN UR STRIP.AUTO-MCNC: 1 E.U./DL
WBC # BLD AUTO: 6.6 K/UL (ref 4.8–10.8)
WBC #/AREA URNS HPF: ABNORMAL /HPF (ref 0–5)

## 2023-10-31 DIAGNOSIS — M54.40 CHRONIC BILATERAL LOW BACK PAIN WITH SCIATICA, SCIATICA LATERALITY UNSPECIFIED: ICD-10-CM

## 2023-10-31 DIAGNOSIS — G89.29 OTHER CHRONIC PAIN: ICD-10-CM

## 2023-10-31 DIAGNOSIS — I10 HYPERTENSION, ESSENTIAL, BENIGN: ICD-10-CM

## 2023-10-31 DIAGNOSIS — G89.29 CHRONIC BILATERAL LOW BACK PAIN WITH SCIATICA, SCIATICA LATERALITY UNSPECIFIED: ICD-10-CM

## 2023-10-31 RX ORDER — AMLODIPINE BESYLATE 5 MG/1
TABLET ORAL
Qty: 30 TABLET | Refills: 5 | Status: SHIPPED | OUTPATIENT
Start: 2023-10-31

## 2023-10-31 RX ORDER — AMITRIPTYLINE HYDROCHLORIDE 25 MG/1
50 TABLET, FILM COATED ORAL
Qty: 60 TABLET | Refills: 5 | OUTPATIENT
Start: 2023-10-31

## 2023-10-31 RX ORDER — AMLODIPINE BESYLATE 5 MG/1
TABLET ORAL
Qty: 30 TABLET | Refills: 5 | OUTPATIENT
Start: 2023-10-31

## 2023-10-31 RX ORDER — AMITRIPTYLINE HYDROCHLORIDE 25 MG/1
50 TABLET, FILM COATED ORAL
Qty: 60 TABLET | Refills: 5 | Status: SHIPPED | OUTPATIENT
Start: 2023-10-31

## 2023-11-06 DIAGNOSIS — I10 HYPERTENSION, ESSENTIAL, BENIGN: ICD-10-CM

## 2023-11-07 DIAGNOSIS — M54.40 CHRONIC BILATERAL LOW BACK PAIN WITH SCIATICA, SCIATICA LATERALITY UNSPECIFIED: ICD-10-CM

## 2023-11-07 DIAGNOSIS — G89.29 CHRONIC BILATERAL LOW BACK PAIN WITH SCIATICA, SCIATICA LATERALITY UNSPECIFIED: ICD-10-CM

## 2023-11-07 NOTE — TELEPHONE ENCOUNTER
Martín Almonte called requesting a refill of the below medication which has been pended for you:     Requested Prescriptions     Pending Prescriptions Disp Refills    hydroCHLOROthiazide (HYDRODIURIL) 25 MG tablet [Pharmacy Med Name: HYDROCHLOROTHIAZIDE 25MG TABLET] 30 tablet 5     Sig: TAKE 1 TABLET BY MOUTH ONCE DAILY       Last Appointment Date: 10/23/2023  Next Appointment Date: 4/23/24    Allergies   Allergen Reactions    Latex Rash, Itching, Swelling and Hives    Codeine Anaphylaxis    Morphine Anaphylaxis    Morphine And Related Anaphylaxis    Eggs Or Egg-Derived Products

## 2023-11-08 RX ORDER — HYDROCHLOROTHIAZIDE 25 MG/1
25 TABLET ORAL DAILY
Qty: 30 TABLET | Refills: 5 | Status: SHIPPED | OUTPATIENT
Start: 2023-11-08

## 2023-11-08 NOTE — TELEPHONE ENCOUNTER
Harlan Leslie called requesting a refill of the below medication which has been pended for you:     Requested Prescriptions     Pending Prescriptions Disp Refills    desvenlafaxine succinate (PRISTIQ) 50 MG TB24 extended release tablet [Pharmacy Med Name: DESVENLAFAXINE ER 50MG TABLET EXTENDED RELEASE 24 HOUR] 30 tablet 2     Sig: TAKE 1 TABLET BY MOUTH ONCE DAILY       Last Appointment Date: 10/23/2023  Next Appointment Date: 4/23/2024    Allergies   Allergen Reactions    Latex Rash, Itching, Swelling and Hives    Codeine Anaphylaxis    Morphine Anaphylaxis    Morphine And Related Anaphylaxis    Eggs Or Egg-Derived Products

## 2023-11-10 RX ORDER — DESVENLAFAXINE SUCCINATE 50 MG/1
TABLET, EXTENDED RELEASE ORAL
Qty: 30 TABLET | Refills: 3 | Status: SHIPPED | OUTPATIENT
Start: 2023-11-10

## 2023-11-10 NOTE — TELEPHONE ENCOUNTER
Per Sherman Gilliland results    Requested Prescriptions     Pending Prescriptions Disp Refills    levothyroxine (SYNTHROID) 50 MCG tablet 90 tablet 0     Sig: Take 1 tablet by mouth daily       Last Appointment Date: 10/23/2023  Next Appointment Date: 4/23/2024    Allergies   Allergen Reactions    Latex Rash, Itching, Swelling and Hives    Codeine Anaphylaxis    Morphine Anaphylaxis    Morphine And Related Anaphylaxis    Eggs Or Egg-Derived Products

## 2023-11-13 RX ORDER — LEVOTHYROXINE SODIUM 0.05 MG/1
50 TABLET ORAL DAILY
Qty: 90 TABLET | Refills: 0 | Status: SHIPPED | OUTPATIENT
Start: 2023-11-13

## 2023-11-26 ENCOUNTER — PATIENT MESSAGE (OUTPATIENT)
Dept: PRIMARY CARE CLINIC | Age: 53
End: 2023-11-26

## 2023-11-26 DIAGNOSIS — E03.9 HYPOTHYROIDISM, UNSPECIFIED TYPE: Primary | ICD-10-CM

## 2023-11-27 NOTE — TELEPHONE ENCOUNTER
From: Carlitos Hill  To: Sangita Necessary  Sent: 11/26/2023 2:03 PM CST  Subject: Blood work recheck     When did you say I need to get my thyroid blood work redone? I forgot to write it down.

## 2023-12-27 DIAGNOSIS — E03.9 HYPOTHYROIDISM, UNSPECIFIED TYPE: ICD-10-CM

## 2023-12-27 LAB — TSH SERPL DL<=0.005 MIU/L-ACNC: 1.19 UIU/ML (ref 0.27–4.2)

## 2024-01-16 DIAGNOSIS — F41.9 ANXIETY: ICD-10-CM

## 2024-01-16 RX ORDER — BUSPIRONE HYDROCHLORIDE 5 MG/1
TABLET ORAL
Qty: 90 TABLET | Refills: 1 | Status: SHIPPED | OUTPATIENT
Start: 2024-01-16

## 2024-02-08 RX ORDER — ROSUVASTATIN CALCIUM 40 MG/1
TABLET, COATED ORAL
Qty: 30 TABLET | Refills: 3 | Status: SHIPPED | OUTPATIENT
Start: 2024-02-08

## 2024-02-08 RX ORDER — PANTOPRAZOLE SODIUM 20 MG/1
TABLET, DELAYED RELEASE ORAL
Qty: 30 TABLET | Refills: 3 | Status: SHIPPED | OUTPATIENT
Start: 2024-02-08

## 2024-02-08 RX ORDER — LEVOTHYROXINE SODIUM 0.05 MG/1
50 TABLET ORAL DAILY
Qty: 90 TABLET | Refills: 0 | Status: SHIPPED | OUTPATIENT
Start: 2024-02-08

## 2024-02-12 DIAGNOSIS — G45.9 TIA (TRANSIENT ISCHEMIC ATTACK): ICD-10-CM

## 2024-02-14 RX ORDER — CLOPIDOGREL BISULFATE 75 MG/1
75 TABLET ORAL EVERY EVENING
Qty: 30 TABLET | Refills: 1 | Status: SHIPPED | OUTPATIENT
Start: 2024-02-14 | End: 2024-04-14

## 2024-02-14 NOTE — TELEPHONE ENCOUNTER
Carina Alvarado called to request a refill on her medication.      Last office visit : 10/23/2023   Next office visit : 4/23/2024     Requested Prescriptions     Pending Prescriptions Disp Refills    clopidogrel (PLAVIX) 75 MG tablet [Pharmacy Med Name: CLOPIDOGREL 75MG TABLET] 30 tablet 1     Sig: TAKE 1 TABLET BY MOUTH EVERY EVENING            Alma Summers MA

## 2024-03-04 DIAGNOSIS — M54.40 CHRONIC BILATERAL LOW BACK PAIN WITH SCIATICA, SCIATICA LATERALITY UNSPECIFIED: ICD-10-CM

## 2024-03-04 DIAGNOSIS — G89.29 CHRONIC BILATERAL LOW BACK PAIN WITH SCIATICA, SCIATICA LATERALITY UNSPECIFIED: ICD-10-CM

## 2024-03-08 NOTE — TELEPHONE ENCOUNTER
Carina Alvarado called to request a refill on her medication.      Last office visit : 10/23/2023   Next office visit : 4/23/2024     Requested Prescriptions     Pending Prescriptions Disp Refills    desvenlafaxine succinate (PRISTIQ) 50 MG TB24 extended release tablet [Pharmacy Med Name: DESVENLAFAXINE ER 50MG TABLET EXTENDED RELEASE 24 HOUR] 30 tablet 3     Sig: TAKE 1 TABLET BY MOUTH ONCE DAILY            Bernice Goodman MA

## 2024-03-11 RX ORDER — DESVENLAFAXINE SUCCINATE 50 MG/1
TABLET, EXTENDED RELEASE ORAL
Qty: 30 TABLET | Refills: 3 | Status: SHIPPED | OUTPATIENT
Start: 2024-03-11

## 2024-04-08 RX ORDER — METOPROLOL SUCCINATE 50 MG/1
50 TABLET, EXTENDED RELEASE ORAL DAILY
Qty: 30 TABLET | Refills: 5 | OUTPATIENT
Start: 2024-04-08

## 2024-04-09 RX ORDER — METOPROLOL SUCCINATE 50 MG/1
50 TABLET, EXTENDED RELEASE ORAL DAILY
Qty: 30 TABLET | Refills: 0 | Status: SHIPPED | OUTPATIENT
Start: 2024-04-09

## 2024-04-09 RX ORDER — METOPROLOL SUCCINATE 50 MG/1
50 TABLET, EXTENDED RELEASE ORAL DAILY
Qty: 30 TABLET | Refills: 5 | OUTPATIENT
Start: 2024-04-09

## 2024-04-09 NOTE — TELEPHONE ENCOUNTER
Patient called stating she needs a refill on metoprolol and she is doing great at current dose. Patient hasn't been seen in over a year and advised that she will need an appt to continue getting medication from cardiology. Dr. Wadsworth's note stated to return at needed and no follow up was scheduled. Advised she could reach out to PCP and see if they will fill Rx in the future. She has an appt 4/23 with PCP and will discuss then. Advised we can give her a 30 day refill and if PCP is  not comfortable refilling then to call us back and we can get her an appt. She voiced understanding.

## 2024-04-27 SDOH — ECONOMIC STABILITY: FOOD INSECURITY: WITHIN THE PAST 12 MONTHS, YOU WORRIED THAT YOUR FOOD WOULD RUN OUT BEFORE YOU GOT MONEY TO BUY MORE.: NEVER TRUE

## 2024-04-27 SDOH — ECONOMIC STABILITY: FOOD INSECURITY: WITHIN THE PAST 12 MONTHS, THE FOOD YOU BOUGHT JUST DIDN'T LAST AND YOU DIDN'T HAVE MONEY TO GET MORE.: NEVER TRUE

## 2024-04-27 SDOH — ECONOMIC STABILITY: TRANSPORTATION INSECURITY
IN THE PAST 12 MONTHS, HAS LACK OF TRANSPORTATION KEPT YOU FROM MEETINGS, WORK, OR FROM GETTING THINGS NEEDED FOR DAILY LIVING?: NO

## 2024-04-27 SDOH — ECONOMIC STABILITY: INCOME INSECURITY: HOW HARD IS IT FOR YOU TO PAY FOR THE VERY BASICS LIKE FOOD, HOUSING, MEDICAL CARE, AND HEATING?: NOT HARD AT ALL

## 2024-04-27 ASSESSMENT — PATIENT HEALTH QUESTIONNAIRE - PHQ9
8. MOVING OR SPEAKING SO SLOWLY THAT OTHER PEOPLE COULD HAVE NOTICED. OR THE OPPOSITE - BEING SO FIDGETY OR RESTLESS THAT YOU HAVE BEEN MOVING AROUND A LOT MORE THAN USUAL: NOT AT ALL
SUM OF ALL RESPONSES TO PHQ QUESTIONS 1-9: 1
7. TROUBLE CONCENTRATING ON THINGS, SUCH AS READING THE NEWSPAPER OR WATCHING TELEVISION: NOT AT ALL
9. THOUGHTS THAT YOU WOULD BE BETTER OFF DEAD, OR OF HURTING YOURSELF: NOT AT ALL
9. THOUGHTS THAT YOU WOULD BE BETTER OFF DEAD, OR OF HURTING YOURSELF: NOT AT ALL
3. TROUBLE FALLING OR STAYING ASLEEP: NOT AT ALL
10. IF YOU CHECKED OFF ANY PROBLEMS, HOW DIFFICULT HAVE THESE PROBLEMS MADE IT FOR YOU TO DO YOUR WORK, TAKE CARE OF THINGS AT HOME, OR GET ALONG WITH OTHER PEOPLE: NOT DIFFICULT AT ALL
SUM OF ALL RESPONSES TO PHQ9 QUESTIONS 1 & 2: 0
2. FEELING DOWN, DEPRESSED OR HOPELESS: NOT AT ALL
8. MOVING OR SPEAKING SO SLOWLY THAT OTHER PEOPLE COULD HAVE NOTICED. OR THE OPPOSITE, BEING SO FIGETY OR RESTLESS THAT YOU HAVE BEEN MOVING AROUND A LOT MORE THAN USUAL: NOT AT ALL
6. FEELING BAD ABOUT YOURSELF - OR THAT YOU ARE A FAILURE OR HAVE LET YOURSELF OR YOUR FAMILY DOWN: NOT AT ALL
7. TROUBLE CONCENTRATING ON THINGS, SUCH AS READING THE NEWSPAPER OR WATCHING TELEVISION: NOT AT ALL
SUM OF ALL RESPONSES TO PHQ QUESTIONS 1-9: 1
10. IF YOU CHECKED OFF ANY PROBLEMS, HOW DIFFICULT HAVE THESE PROBLEMS MADE IT FOR YOU TO DO YOUR WORK, TAKE CARE OF THINGS AT HOME, OR GET ALONG WITH OTHER PEOPLE: NOT DIFFICULT AT ALL
SUM OF ALL RESPONSES TO PHQ QUESTIONS 1-9: 1
5. POOR APPETITE OR OVEREATING: NOT AT ALL
SUM OF ALL RESPONSES TO PHQ QUESTIONS 1-9: 1
5. POOR APPETITE OR OVEREATING: NOT AT ALL
1. LITTLE INTEREST OR PLEASURE IN DOING THINGS: NOT AT ALL
4. FEELING TIRED OR HAVING LITTLE ENERGY: SEVERAL DAYS
1. LITTLE INTEREST OR PLEASURE IN DOING THINGS: NOT AT ALL
2. FEELING DOWN, DEPRESSED OR HOPELESS: NOT AT ALL
6. FEELING BAD ABOUT YOURSELF - OR THAT YOU ARE A FAILURE OR HAVE LET YOURSELF OR YOUR FAMILY DOWN: NOT AT ALL
3. TROUBLE FALLING OR STAYING ASLEEP: NOT AT ALL
4. FEELING TIRED OR HAVING LITTLE ENERGY: SEVERAL DAYS
SUM OF ALL RESPONSES TO PHQ QUESTIONS 1-9: 1

## 2024-04-29 ENCOUNTER — OFFICE VISIT (OUTPATIENT)
Dept: PRIMARY CARE CLINIC | Age: 54
End: 2024-04-29
Payer: COMMERCIAL

## 2024-04-29 VITALS
SYSTOLIC BLOOD PRESSURE: 106 MMHG | WEIGHT: 162 LBS | HEIGHT: 65 IN | DIASTOLIC BLOOD PRESSURE: 76 MMHG | HEART RATE: 82 BPM | OXYGEN SATURATION: 97 % | TEMPERATURE: 97.5 F | BODY MASS INDEX: 26.99 KG/M2

## 2024-04-29 DIAGNOSIS — Z12.11 COLON CANCER SCREENING: ICD-10-CM

## 2024-04-29 DIAGNOSIS — M54.40 CHRONIC BILATERAL LOW BACK PAIN WITH SCIATICA, SCIATICA LATERALITY UNSPECIFIED: ICD-10-CM

## 2024-04-29 DIAGNOSIS — E03.9 HYPOTHYROIDISM, UNSPECIFIED TYPE: ICD-10-CM

## 2024-04-29 DIAGNOSIS — Z00.00 ENCOUNTER FOR ANNUAL PHYSICAL EXAM: Primary | ICD-10-CM

## 2024-04-29 DIAGNOSIS — G89.29 CHRONIC BILATERAL LOW BACK PAIN WITH SCIATICA, SCIATICA LATERALITY UNSPECIFIED: ICD-10-CM

## 2024-04-29 DIAGNOSIS — F41.9 ANXIETY: ICD-10-CM

## 2024-04-29 DIAGNOSIS — I10 HYPERTENSION, ESSENTIAL, BENIGN: ICD-10-CM

## 2024-04-29 DIAGNOSIS — Z12.31 ENCOUNTER FOR SCREENING MAMMOGRAM FOR BREAST CANCER: ICD-10-CM

## 2024-04-29 DIAGNOSIS — J42 CHRONIC BRONCHITIS, UNSPECIFIED CHRONIC BRONCHITIS TYPE (HCC): ICD-10-CM

## 2024-04-29 PROCEDURE — 99396 PREV VISIT EST AGE 40-64: CPT | Performed by: NURSE PRACTITIONER

## 2024-04-29 PROCEDURE — 3078F DIAST BP <80 MM HG: CPT | Performed by: NURSE PRACTITIONER

## 2024-04-29 PROCEDURE — 3074F SYST BP LT 130 MM HG: CPT | Performed by: NURSE PRACTITIONER

## 2024-04-29 ASSESSMENT — ENCOUNTER SYMPTOMS
NAUSEA: 0
RHINORRHEA: 0
SHORTNESS OF BREATH: 0
VOICE CHANGE: 0
COUGH: 0
PHOTOPHOBIA: 0
COLOR CHANGE: 0
BACK PAIN: 0
VOMITING: 0

## 2024-04-29 NOTE — PROGRESS NOTES
Encounter for screening mammogram for breast cancer  Vitamin D 25 Hydroxy    Lipid Panel    Hemoglobin A1C    Comprehensive Metabolic Panel    TSH    CBC with Auto Differential    Hepatitis C Antibody    HIV Rapid 1&2    ZABRINA DIGITAL SCREEN W OR WO CAD BILATERAL      5. Hypothyroidism, unspecified type  Vitamin D 25 Hydroxy    Lipid Panel    Hemoglobin A1C    Comprehensive Metabolic Panel    TSH    CBC with Auto Differential    Hepatitis C Antibody    HIV Rapid 1&2      6. Chronic bilateral low back pain with sciatica, sciatica laterality unspecified  Vitamin D 25 Hydroxy    Lipid Panel    Hemoglobin A1C    Comprehensive Metabolic Panel    TSH    CBC with Auto Differential    Hepatitis C Antibody    HIV Rapid 1&2      7. Chronic bronchitis, unspecified chronic bronchitis type (HCC)  Vitamin D 25 Hydroxy    Lipid Panel    Hemoglobin A1C    Comprehensive Metabolic Panel    TSH    CBC with Auto Differential    Hepatitis C Antibody    HIV Rapid 1&2      8. Colon cancer screening  Vitamin D 25 Hydroxy    Lipid Panel    Hemoglobin A1C    Comprehensive Metabolic Panel    TSH    CBC with Auto Differential    Hepatitis C Antibody    HIV Rapid 1&2            Plan:     Mammogram scheduled 5/6/24.   Fasting labs in [address].   Follow-up in 6 months or sooner if needed.     PDMP Monitoring:    Last PDMP Beck as Reviewed:  Review User Review Instant Review Result            Urine Drug Screenings (1 yr)       POCT Rapid Drug Screen  Collected: 4/26/2019  8:58 AM (Final result)              Urine Drug Screen  Collected: 5/9/2022 11:52 AM (Final result)                  Medication Contract and Consent for Opioid Use Documents Filed       Patient Documents       Type of Document Status Date Received Received By Description    Medication Contract Received 5/2/2019  3:11 PM ALICIA SR Medication consent                      Patient given educational materials -see patient instructions.  Discussed use, benefit, and side effects of

## 2024-05-02 DIAGNOSIS — G45.9 TIA (TRANSIENT ISCHEMIC ATTACK): ICD-10-CM

## 2024-05-02 RX ORDER — METOPROLOL SUCCINATE 50 MG/1
50 TABLET, EXTENDED RELEASE ORAL DAILY
Qty: 30 TABLET | Refills: 0 | OUTPATIENT
Start: 2024-05-02

## 2024-05-02 RX ORDER — CLOPIDOGREL BISULFATE 75 MG/1
75 TABLET ORAL EVERY EVENING
Qty: 30 TABLET | Refills: 1 | Status: SHIPPED | OUTPATIENT
Start: 2024-05-02 | End: 2024-07-01

## 2024-05-02 NOTE — TELEPHONE ENCOUNTER
Carina Alvarado called to request a refill on her medication.      Last office visit : 4/29/2024   Next office visit : Visit date not found     Requested Prescriptions     Pending Prescriptions Disp Refills    clopidogrel (PLAVIX) 75 MG tablet [Pharmacy Med Name: CLOPIDOGREL 75MG TABLET] 30 tablet 1     Sig: TAKE 1 TABLET BY MOUTH EVERY EVENING            Aliya Zaidi MA

## 2024-05-06 RX ORDER — LEVOTHYROXINE SODIUM 0.05 MG/1
50 TABLET ORAL DAILY
Qty: 90 TABLET | Refills: 0 | Status: SHIPPED | OUTPATIENT
Start: 2024-05-06

## 2024-05-07 DIAGNOSIS — G89.29 CHRONIC BILATERAL LOW BACK PAIN WITH SCIATICA, SCIATICA LATERALITY UNSPECIFIED: ICD-10-CM

## 2024-05-07 DIAGNOSIS — I10 HYPERTENSION, ESSENTIAL, BENIGN: ICD-10-CM

## 2024-05-07 DIAGNOSIS — M54.40 CHRONIC BILATERAL LOW BACK PAIN WITH SCIATICA, SCIATICA LATERALITY UNSPECIFIED: ICD-10-CM

## 2024-05-07 DIAGNOSIS — G89.29 OTHER CHRONIC PAIN: ICD-10-CM

## 2024-05-08 RX ORDER — AMLODIPINE BESYLATE 5 MG/1
5 TABLET ORAL NIGHTLY
Qty: 30 TABLET | Refills: 5 | Status: SHIPPED | OUTPATIENT
Start: 2024-05-08

## 2024-05-08 RX ORDER — METOPROLOL SUCCINATE 50 MG/1
50 TABLET, EXTENDED RELEASE ORAL DAILY
Qty: 30 TABLET | Refills: 0 | Status: SHIPPED | OUTPATIENT
Start: 2024-05-08

## 2024-05-08 RX ORDER — HYDROCHLOROTHIAZIDE 25 MG/1
25 TABLET ORAL DAILY
Qty: 30 TABLET | Refills: 5 | Status: SHIPPED | OUTPATIENT
Start: 2024-05-08

## 2024-05-08 RX ORDER — AMITRIPTYLINE HYDROCHLORIDE 25 MG/1
TABLET, FILM COATED ORAL
Qty: 60 TABLET | Refills: 5 | Status: SHIPPED | OUTPATIENT
Start: 2024-05-08

## 2024-05-08 NOTE — TELEPHONE ENCOUNTER
Carina Alvarado called to request a refill on her medication.      Last office visit : 4/29/2024   Next office visit : Visit date not found     Requested Prescriptions     Pending Prescriptions Disp Refills    metoprolol succinate (TOPROL XL) 50 MG extended release tablet [Pharmacy Med Name: METOPROLOL SUCCINATE ER 50MG TABLET EXTENDED RELEASE 24 HOUR] 30 tablet 0     Sig: TAKE 1 TABLET BY MOUTH ONCE DAILY    amitriptyline (ELAVIL) 25 MG tablet [Pharmacy Med Name: AMITRIPTYLINE HYDROCHLORIDE 25MG TABLET] 60 tablet 5     Sig: TAKE (2) TABLETS BY MOUTH DAILY AT BEDTIME.    amLODIPine (NORVASC) 5 MG tablet [Pharmacy Med Name: AMLODIPINE BESYLATE 5MG TABLET] 30 tablet 5     Sig: TAKE 1 TABLET BY MOUTH NIGHTLY    hydroCHLOROthiazide (HYDRODIURIL) 25 MG tablet [Pharmacy Med Name: HYDROCHLOROTHIAZIDE 25MG TABLET] 30 tablet 5     Sig: TAKE 1 TABLET BY MOUTH ONCE DAILY            Alma Summers MA

## 2024-05-09 DIAGNOSIS — Z00.00 ENCOUNTER FOR ANNUAL PHYSICAL EXAM: ICD-10-CM

## 2024-05-09 DIAGNOSIS — G89.29 CHRONIC BILATERAL LOW BACK PAIN WITH SCIATICA, SCIATICA LATERALITY UNSPECIFIED: ICD-10-CM

## 2024-05-09 DIAGNOSIS — Z12.31 ENCOUNTER FOR SCREENING MAMMOGRAM FOR BREAST CANCER: ICD-10-CM

## 2024-05-09 DIAGNOSIS — I10 HYPERTENSION, ESSENTIAL, BENIGN: ICD-10-CM

## 2024-05-09 DIAGNOSIS — Z12.11 COLON CANCER SCREENING: ICD-10-CM

## 2024-05-09 DIAGNOSIS — F41.9 ANXIETY: ICD-10-CM

## 2024-05-09 DIAGNOSIS — J42 CHRONIC BRONCHITIS, UNSPECIFIED CHRONIC BRONCHITIS TYPE (HCC): ICD-10-CM

## 2024-05-09 DIAGNOSIS — M54.40 CHRONIC BILATERAL LOW BACK PAIN WITH SCIATICA, SCIATICA LATERALITY UNSPECIFIED: ICD-10-CM

## 2024-05-09 DIAGNOSIS — E03.9 HYPOTHYROIDISM, UNSPECIFIED TYPE: ICD-10-CM

## 2024-05-09 LAB
25(OH)D3 SERPL-MCNC: 53.6 NG/ML
ALBUMIN SERPL-MCNC: 4.5 G/DL (ref 3.5–5.2)
ALP SERPL-CCNC: 62 U/L (ref 35–104)
ALT SERPL-CCNC: 87 U/L (ref 5–33)
ANION GAP SERPL CALCULATED.3IONS-SCNC: 12 MMOL/L (ref 7–19)
AST SERPL-CCNC: 64 U/L (ref 5–32)
BASOPHILS # BLD: 0.1 K/UL (ref 0–0.2)
BASOPHILS NFR BLD: 1.4 % (ref 0–1)
BILIRUB SERPL-MCNC: 0.4 MG/DL (ref 0.2–1.2)
BUN SERPL-MCNC: 7 MG/DL (ref 6–20)
CALCIUM SERPL-MCNC: 9.6 MG/DL (ref 8.6–10)
CHLORIDE SERPL-SCNC: 98 MMOL/L (ref 98–111)
CHOLEST SERPL-MCNC: 125 MG/DL (ref 160–199)
CO2 SERPL-SCNC: 27 MMOL/L (ref 22–29)
CREAT SERPL-MCNC: 0.8 MG/DL (ref 0.5–0.9)
EOSINOPHIL # BLD: 0.4 K/UL (ref 0–0.6)
EOSINOPHIL NFR BLD: 5.6 % (ref 0–5)
ERYTHROCYTE [DISTWIDTH] IN BLOOD BY AUTOMATED COUNT: 12.4 % (ref 11.5–14.5)
GLUCOSE SERPL-MCNC: 192 MG/DL (ref 74–109)
HBA1C MFR BLD: 8.5 % (ref 4–6)
HCT VFR BLD AUTO: 46.6 % (ref 37–47)
HCV AB SERPL QL IA: NORMAL
HDLC SERPL-MCNC: 31 MG/DL (ref 65–121)
HGB BLD-MCNC: 15.7 G/DL (ref 12–16)
HIV-1 P24 AG: NORMAL
HIV1+2 AB SERPLBLD QL IA.RAPID: NORMAL
IMM GRANULOCYTES # BLD: 0 K/UL
LDLC SERPL CALC-MCNC: 26 MG/DL
LYMPHOCYTES # BLD: 2.6 K/UL (ref 1.1–4.5)
LYMPHOCYTES NFR BLD: 41.2 % (ref 20–40)
MCH RBC QN AUTO: 29.1 PG (ref 27–31)
MCHC RBC AUTO-ENTMCNC: 33.7 G/DL (ref 33–37)
MCV RBC AUTO: 86.5 FL (ref 81–99)
MONOCYTES # BLD: 0.5 K/UL (ref 0–0.9)
MONOCYTES NFR BLD: 8.7 % (ref 0–10)
NEUTROPHILS # BLD: 2.7 K/UL (ref 1.5–7.5)
NEUTS SEG NFR BLD: 42.8 % (ref 50–65)
PLATELET # BLD AUTO: 349 K/UL (ref 130–400)
PMV BLD AUTO: 10.9 FL (ref 9.4–12.3)
POTASSIUM SERPL-SCNC: 4.4 MMOL/L (ref 3.5–5)
PROT SERPL-MCNC: 7.4 G/DL (ref 6.6–8.7)
RBC # BLD AUTO: 5.39 M/UL (ref 4.2–5.4)
SODIUM SERPL-SCNC: 137 MMOL/L (ref 136–145)
TRIGL SERPL-MCNC: 342 MG/DL (ref 0–149)
TSH SERPL DL<=0.005 MIU/L-ACNC: 1.69 UIU/ML (ref 0.27–4.2)
WBC # BLD AUTO: 6.2 K/UL (ref 4.8–10.8)

## 2024-05-18 ENCOUNTER — OFFICE VISIT (OUTPATIENT)
Age: 54
End: 2024-05-18
Payer: COMMERCIAL

## 2024-05-18 VITALS
SYSTOLIC BLOOD PRESSURE: 116 MMHG | HEART RATE: 69 BPM | TEMPERATURE: 97.3 F | BODY MASS INDEX: 27.12 KG/M2 | DIASTOLIC BLOOD PRESSURE: 68 MMHG | OXYGEN SATURATION: 97 % | RESPIRATION RATE: 20 BRPM | WEIGHT: 163 LBS

## 2024-05-18 DIAGNOSIS — L03.012 PARONYCHIA OF FINGER OF LEFT HAND: Primary | ICD-10-CM

## 2024-05-18 PROCEDURE — 3074F SYST BP LT 130 MM HG: CPT

## 2024-05-18 PROCEDURE — 3078F DIAST BP <80 MM HG: CPT

## 2024-05-18 PROCEDURE — 99213 OFFICE O/P EST LOW 20 MIN: CPT

## 2024-05-18 RX ORDER — CEPHALEXIN 500 MG/1
500 CAPSULE ORAL 2 TIMES DAILY
Qty: 14 CAPSULE | Refills: 0 | Status: SHIPPED | OUTPATIENT
Start: 2024-05-18 | End: 2024-05-25

## 2024-05-18 ASSESSMENT — ENCOUNTER SYMPTOMS
SORE THROAT: 0
EYE REDNESS: 0
WHEEZING: 0
CONSTIPATION: 0
ABDOMINAL PAIN: 0
VOMITING: 0
ALLERGIC/IMMUNOLOGIC NEGATIVE: 1
CHEST TIGHTNESS: 0
COUGH: 0
SINUS PAIN: 0
EYE ITCHING: 0
DIARRHEA: 0
EYE DISCHARGE: 0
BACK PAIN: 0

## 2024-05-18 NOTE — PATIENT INSTRUCTIONS
-Use bactroban ointment as prescribed.   -Take antibiotic as prescribed  -Wash hands with antibacterial soap.  -Cont epsom salt soaks   -Avoid trimming cuticles until healed  -Avoid nail picking   -May apply warm compress to allow spontaneous drainage   -Keep covered while in public or working in the yard. May leave open to air if resting at home.   -Monitor for worsening redness, warmth, or purulent drainage.   -The patient is to follow up with PCP or return to clinic if symptoms worsen/fail to improve.    Any condition can change, despite proper treatment. Therefore, if symptoms still persist or worsen after treatment plan intitated today, patient is to go to the nearest ER, call PCP, or return to urgent care for further evaluation.     Urgent Care evaluation today is not a substitute for PCP visit. Follow up care is the patient's responsibility to discuss and review this UC visit.

## 2024-05-18 NOTE — PROGRESS NOTES
[facility]  [facility]  [address] [place] [id number]  Dept: [phone]  Dept Fax: [phone]  Loc: [phone]    Carina Alvarado is a 54 y.o. female who presents today for her medical conditions/complaints as noted below.  Carina Alvarado is complaining of left hand/middle finger/swelling, pain and reddness (3-4 days)      HPI:     Carina Alvarado presents to clinic for evaluation of left middle finger swelling, pain, and redness x 3-4 days. Has soaked her finger in epsom salt with no relief. No other alleviating factors reported. Denies fever.     Past Medical History:   Diagnosis Date    Allergic rhinitis 11/10/2021    Anxiety 2020    Asthma     Chronic back pain 2018    Chronic migraine without aura, intractable, without status migrainosus     Colon polyps     Depression 2018    Elevated blood pressure, situational 2018    Essential tremor     Fibromyalgia     Liver disease     Fatty Liver    Meniere disease 2017    Osteoarthritis     Sciatica of right side 2020    Tinnitus        Past Surgical History:   Procedure Laterality Date     SECTION      CHOLECYSTECTOMY, LAPAROSCOPIC  2015    Dr. Dennis    COLONOSCOPY  ?    ?    HYSTERECTOMY (CERVIX STATUS UNKNOWN)      LIVER BIOPSY  2015    Dr. Dennis       Family History   Problem Relation Age of Onset    Breast Cancer Mother 48    Colon Polyps Mother     Liver Cancer Mother     Heart Disease Mother     Cancer Mother     Diabetes Mother     Heart Disease Father     Cancer Father     Colon Polyps Maternal Grandmother     Breast Cancer Maternal Grandfather 52    Colon Polyps Paternal Grandfather     Liver Cancer Paternal Grandfather     Colon Cancer Neg Hx     Esophageal Cancer Neg Hx     Liver Disease Neg Hx     Rectal Cancer Neg Hx     Stomach Cancer Neg Hx        Social History     Tobacco Use    Smoking status: Never    Smokeless tobacco: Never

## 2024-05-21 ENCOUNTER — OFFICE VISIT (OUTPATIENT)
Dept: PRIMARY CARE CLINIC | Age: 54
End: 2024-05-21
Payer: COMMERCIAL

## 2024-05-21 VITALS
HEIGHT: 65 IN | SYSTOLIC BLOOD PRESSURE: 130 MMHG | TEMPERATURE: 97 F | OXYGEN SATURATION: 85 % | WEIGHT: 161 LBS | BODY MASS INDEX: 26.82 KG/M2 | DIASTOLIC BLOOD PRESSURE: 84 MMHG | HEART RATE: 97 BPM

## 2024-05-21 DIAGNOSIS — I10 HYPERTENSION, ESSENTIAL, BENIGN: ICD-10-CM

## 2024-05-21 DIAGNOSIS — R79.89 ELEVATED LFTS: ICD-10-CM

## 2024-05-21 DIAGNOSIS — E11.9 TYPE 2 DIABETES MELLITUS WITHOUT COMPLICATION, WITHOUT LONG-TERM CURRENT USE OF INSULIN (HCC): Primary | ICD-10-CM

## 2024-05-21 DIAGNOSIS — E78.2 MIXED HYPERLIPIDEMIA: ICD-10-CM

## 2024-05-21 PROCEDURE — 99214 OFFICE O/P EST MOD 30 MIN: CPT | Performed by: NURSE PRACTITIONER

## 2024-05-21 PROCEDURE — 3079F DIAST BP 80-89 MM HG: CPT | Performed by: NURSE PRACTITIONER

## 2024-05-21 PROCEDURE — 3075F SYST BP GE 130 - 139MM HG: CPT | Performed by: NURSE PRACTITIONER

## 2024-05-21 PROCEDURE — 3052F HG A1C>EQUAL 8.0%<EQUAL 9.0%: CPT | Performed by: NURSE PRACTITIONER

## 2024-05-21 RX ORDER — BLOOD-GLUCOSE METER
1 KIT MISCELLANEOUS DAILY
Qty: 1 KIT | Refills: 0 | Status: SHIPPED | OUTPATIENT
Start: 2024-05-21

## 2024-05-21 ASSESSMENT — ENCOUNTER SYMPTOMS
COUGH: 0
VOMITING: 0
PHOTOPHOBIA: 0
BACK PAIN: 0
COLOR CHANGE: 0
VOICE CHANGE: 0
RHINORRHEA: 0
NAUSEA: 0
SHORTNESS OF BREATH: 0

## 2024-05-21 NOTE — PROGRESS NOTES
metformin 500 mg daily with breakfast then increase to 500 mg twice daily with meals.   Decrease amlodipine to 2.5 mg nightly.   Decrease rosuvastatin to 20 mg nightly.   Monitor blood sugar in the morning fasting. Goal <130.   Follow-up in 1 month or sooner if needed.   Electronically signed by JUAN JOSE Ely on 5/21/2024 at 2:31 PM    EMR Dragon/transcription disclaimer:  Much of thisencounter note is electronic transcription/translation of spoken language to printed texts.  The electronic translation of spoken language may be erroneous, or at times, nonsensical words or phrases may be inadvertentlytranscribed.  Although I have reviewed the note for such errors, some may still exist.

## 2024-05-21 NOTE — PATIENT INSTRUCTIONS
Begin Ozempic 0.25 mg injection weekly.   Begin metformin 500 mg daily with breakfast then increase to 500 mg twice daily with meals.   Decrease amlodipine to 2.5 mg nightly.   Decrease rosuvastatin to 20 mg nightly.   Monitor blood sugar in the morning fasting. Goal <130.   Follow-up in 1 month or sooner if needed.

## 2024-06-17 RX ORDER — METOPROLOL SUCCINATE 50 MG/1
50 TABLET, EXTENDED RELEASE ORAL DAILY
Qty: 30 TABLET | Refills: 0 | Status: SHIPPED | OUTPATIENT
Start: 2024-06-17

## 2024-06-17 RX ORDER — PANTOPRAZOLE SODIUM 20 MG/1
TABLET, DELAYED RELEASE ORAL
Qty: 30 TABLET | Refills: 3 | Status: SHIPPED | OUTPATIENT
Start: 2024-06-17

## 2024-06-17 RX ORDER — ROSUVASTATIN CALCIUM 40 MG/1
TABLET, COATED ORAL
Qty: 30 TABLET | Refills: 3 | Status: SHIPPED | OUTPATIENT
Start: 2024-06-17

## 2024-06-17 NOTE — TELEPHONE ENCOUNTER
Carina Alvarado called to request a refill on her medication.      Last office visit : 5/21/2024   Next office visit : 6/19/2024     Requested Prescriptions     Pending Prescriptions Disp Refills    metoprolol succinate (TOPROL XL) 50 MG extended release tablet [Pharmacy Med Name: METOPROLOL SUCCINATE ER 50MG TABLET EXTENDED RELEASE 24 HOUR] 30 tablet 0     Sig: TAKE 1 TABLET BY MOUTH ONCE DAILY    rosuvastatin (CRESTOR) 40 MG tablet [Pharmacy Med Name: ROSUVASTATIN CALCIUM 40MG TABLET] 30 tablet 3     Sig: TAKE 1 TABLET BY MOUTH ONCE NIGHTLY    pantoprazole (PROTONIX) 20 MG tablet [Pharmacy Med Name: PANTOPRAZOLE SODIUM 20MG TABLET DELAYED RELEASE] 30 tablet 3     Sig: TAKE 1 TABLET BY MOUTH ONCE DAILY            Bernice Goodman MA

## 2024-06-19 ENCOUNTER — OFFICE VISIT (OUTPATIENT)
Dept: PRIMARY CARE CLINIC | Age: 54
End: 2024-06-19
Payer: COMMERCIAL

## 2024-06-19 VITALS
HEART RATE: 69 BPM | OXYGEN SATURATION: 99 % | SYSTOLIC BLOOD PRESSURE: 118 MMHG | DIASTOLIC BLOOD PRESSURE: 80 MMHG | TEMPERATURE: 97.5 F | BODY MASS INDEX: 26.19 KG/M2 | WEIGHT: 157.2 LBS | HEIGHT: 65 IN

## 2024-06-19 DIAGNOSIS — E11.9 TYPE 2 DIABETES MELLITUS WITHOUT COMPLICATION, WITHOUT LONG-TERM CURRENT USE OF INSULIN (HCC): Primary | ICD-10-CM

## 2024-06-19 DIAGNOSIS — I10 HYPERTENSION, ESSENTIAL, BENIGN: ICD-10-CM

## 2024-06-19 PROCEDURE — 3074F SYST BP LT 130 MM HG: CPT | Performed by: NURSE PRACTITIONER

## 2024-06-19 PROCEDURE — 3052F HG A1C>EQUAL 8.0%<EQUAL 9.0%: CPT | Performed by: NURSE PRACTITIONER

## 2024-06-19 PROCEDURE — 3079F DIAST BP 80-89 MM HG: CPT | Performed by: NURSE PRACTITIONER

## 2024-06-19 PROCEDURE — 99213 OFFICE O/P EST LOW 20 MIN: CPT | Performed by: NURSE PRACTITIONER

## 2024-06-19 ASSESSMENT — ENCOUNTER SYMPTOMS
NAUSEA: 0
PHOTOPHOBIA: 0
COLOR CHANGE: 0
VOMITING: 0
COUGH: 0
BACK PAIN: 0
VOICE CHANGE: 0
SHORTNESS OF BREATH: 0
RHINORRHEA: 0

## 2024-06-19 NOTE — PROGRESS NOTES
100 % 99 %   Weight - Scale 157 lb 3.2 oz 161 lb 163 lb 162 lb 152 lb 153 lb   Height 5' 5\" 5' 5\"  5' 5\" 5' 5\" 5' 5\"   Body Mass Index 26.16 kg/m2 26.79 kg/m2  26.96 kg/m2 25.29 kg/m2 25.46 kg/m2       Family History   Problem Relation Age of Onset    Breast Cancer Mother 48    Colon Polyps Mother     Liver Cancer Mother     Heart Disease Mother     Cancer Mother     Diabetes Mother     Heart Disease Father     Cancer Father     Colon Polyps Maternal Grandmother     Breast Cancer Maternal Grandfather 52    Colon Polyps Paternal Grandfather     Liver Cancer Paternal Grandfather     Colon Cancer Neg Hx     Esophageal Cancer Neg Hx     Liver Disease Neg Hx     Rectal Cancer Neg Hx     Stomach Cancer Neg Hx        Social History     Tobacco Use    Smoking status: Never    Smokeless tobacco: Never   Substance Use Topics    Alcohol use: No      Current Outpatient Medications on File Prior to Visit   Medication Sig Dispense Refill    metoprolol succinate (TOPROL XL) 50 MG extended release tablet TAKE 1 TABLET BY MOUTH ONCE DAILY 30 tablet 0    rosuvastatin (CRESTOR) 40 MG tablet TAKE 1 TABLET BY MOUTH ONCE NIGHTLY 30 tablet 3    pantoprazole (PROTONIX) 20 MG tablet TAKE 1 TABLET BY MOUTH ONCE DAILY 30 tablet 3    Semaglutide,0.25 or 0.5MG/DOS, 2 MG/1.5ML SOPN Inject 0.25 mg into the skin every 7 days 1 Adjustable Dose Pre-filled Pen Syringe 0    metFORMIN (GLUCOPHAGE) 500 MG tablet Take 1 tablet by mouth 2 times daily (with meals) 60 tablet 0    glucose monitoring kit 1 kit by Does not apply route daily 1 kit 0    amitriptyline (ELAVIL) 25 MG tablet TAKE (2) TABLETS BY MOUTH DAILY AT BEDTIME. 60 tablet 5    amLODIPine (NORVASC) 5 MG tablet TAKE 1 TABLET BY MOUTH NIGHTLY 30 tablet 5    hydroCHLOROthiazide (HYDRODIURIL) 25 MG tablet TAKE 1 TABLET BY MOUTH ONCE DAILY 30 tablet 5    levothyroxine (SYNTHROID) 50 MCG tablet TAKE 1 TABLET BY MOUTH ONCE DAILY 90 tablet 0    clopidogrel (PLAVIX) 75 MG tablet TAKE 1 TABLET BY MOUTH

## 2024-06-19 NOTE — PATIENT INSTRUCTIONS
Increase Ozempic to 0.5 mg weekly.   Labs prior to next visit.   Follow-up in 2 months or sooner if needed.

## 2024-07-13 DIAGNOSIS — G45.9 TIA (TRANSIENT ISCHEMIC ATTACK): ICD-10-CM

## 2024-07-13 DIAGNOSIS — F41.9 ANXIETY: ICD-10-CM

## 2024-07-13 DIAGNOSIS — M54.40 CHRONIC BILATERAL LOW BACK PAIN WITH SCIATICA, SCIATICA LATERALITY UNSPECIFIED: ICD-10-CM

## 2024-07-13 DIAGNOSIS — G89.29 CHRONIC BILATERAL LOW BACK PAIN WITH SCIATICA, SCIATICA LATERALITY UNSPECIFIED: ICD-10-CM

## 2024-07-15 RX ORDER — CLOPIDOGREL BISULFATE 75 MG/1
75 TABLET ORAL EVERY EVENING
Qty: 30 TABLET | Refills: 1 | Status: SHIPPED | OUTPATIENT
Start: 2024-07-15 | End: 2024-09-13

## 2024-07-15 RX ORDER — DESVENLAFAXINE SUCCINATE 50 MG/1
TABLET, EXTENDED RELEASE ORAL
Qty: 30 TABLET | Refills: 3 | Status: SHIPPED | OUTPATIENT
Start: 2024-07-15

## 2024-07-15 RX ORDER — METOPROLOL SUCCINATE 50 MG/1
50 TABLET, EXTENDED RELEASE ORAL DAILY
Qty: 30 TABLET | Refills: 0 | Status: SHIPPED | OUTPATIENT
Start: 2024-07-15

## 2024-07-15 RX ORDER — BUSPIRONE HYDROCHLORIDE 5 MG/1
TABLET ORAL
Qty: 90 TABLET | Refills: 1 | Status: SHIPPED | OUTPATIENT
Start: 2024-07-15

## 2024-08-04 ENCOUNTER — OFFICE VISIT (OUTPATIENT)
Age: 54
End: 2024-08-04
Payer: COMMERCIAL

## 2024-08-04 VITALS
WEIGHT: 153.4 LBS | BODY MASS INDEX: 25.56 KG/M2 | SYSTOLIC BLOOD PRESSURE: 126 MMHG | OXYGEN SATURATION: 99 % | HEART RATE: 78 BPM | RESPIRATION RATE: 20 BRPM | DIASTOLIC BLOOD PRESSURE: 68 MMHG | TEMPERATURE: 98.9 F | HEIGHT: 65 IN

## 2024-08-04 DIAGNOSIS — J02.9 SORE THROAT: ICD-10-CM

## 2024-08-04 DIAGNOSIS — J06.9 UPPER RESPIRATORY TRACT INFECTION, UNSPECIFIED TYPE: Primary | ICD-10-CM

## 2024-08-04 DIAGNOSIS — R05.9 COUGH, UNSPECIFIED TYPE: ICD-10-CM

## 2024-08-04 LAB — S PYO AG THROAT QL: NORMAL

## 2024-08-04 PROCEDURE — 96372 THER/PROPH/DIAG INJ SC/IM: CPT | Performed by: NURSE PRACTITIONER

## 2024-08-04 PROCEDURE — 3078F DIAST BP <80 MM HG: CPT | Performed by: NURSE PRACTITIONER

## 2024-08-04 PROCEDURE — 87880 STREP A ASSAY W/OPTIC: CPT | Performed by: NURSE PRACTITIONER

## 2024-08-04 PROCEDURE — 99213 OFFICE O/P EST LOW 20 MIN: CPT | Performed by: NURSE PRACTITIONER

## 2024-08-04 PROCEDURE — 3074F SYST BP LT 130 MM HG: CPT | Performed by: NURSE PRACTITIONER

## 2024-08-04 RX ORDER — DEXAMETHASONE SODIUM PHOSPHATE 10 MG/ML
6 INJECTION INTRAMUSCULAR; INTRAVENOUS ONCE
Status: COMPLETED | OUTPATIENT
Start: 2024-08-04 | End: 2024-08-04

## 2024-08-04 RX ORDER — AMOXICILLIN AND CLAVULANATE POTASSIUM 875; 125 MG/1; MG/1
1 TABLET, FILM COATED ORAL 2 TIMES DAILY
Qty: 20 TABLET | Refills: 0 | Status: SHIPPED | OUTPATIENT
Start: 2024-08-04 | End: 2024-08-14

## 2024-08-04 RX ADMIN — DEXAMETHASONE SODIUM PHOSPHATE 6 MG: 10 INJECTION INTRAMUSCULAR; INTRAVENOUS at 09:39

## 2024-08-19 RX ORDER — METOPROLOL SUCCINATE 50 MG/1
50 TABLET, EXTENDED RELEASE ORAL DAILY
Qty: 30 TABLET | Refills: 0 | Status: SHIPPED | OUTPATIENT
Start: 2024-08-19

## 2024-08-19 RX ORDER — LEVOTHYROXINE SODIUM 0.05 MG/1
50 TABLET ORAL DAILY
Qty: 90 TABLET | Refills: 0 | Status: SHIPPED | OUTPATIENT
Start: 2024-08-19

## 2024-08-22 ENCOUNTER — OFFICE VISIT (OUTPATIENT)
Dept: PRIMARY CARE CLINIC | Age: 54
End: 2024-08-22
Payer: COMMERCIAL

## 2024-08-22 VITALS
DIASTOLIC BLOOD PRESSURE: 84 MMHG | OXYGEN SATURATION: 99 % | WEIGHT: 152.4 LBS | HEART RATE: 66 BPM | HEIGHT: 65 IN | SYSTOLIC BLOOD PRESSURE: 120 MMHG | BODY MASS INDEX: 25.39 KG/M2 | TEMPERATURE: 97.6 F

## 2024-08-22 DIAGNOSIS — J06.9 ACUTE URI: Primary | ICD-10-CM

## 2024-08-22 DIAGNOSIS — J40 BRONCHITIS: ICD-10-CM

## 2024-08-22 DIAGNOSIS — E11.9 TYPE 2 DIABETES MELLITUS WITHOUT COMPLICATION, WITHOUT LONG-TERM CURRENT USE OF INSULIN (HCC): ICD-10-CM

## 2024-08-22 LAB
ALBUMIN SERPL-MCNC: 4.5 G/DL (ref 3.5–5.2)
ALP SERPL-CCNC: 56 U/L (ref 35–104)
ALT SERPL-CCNC: 48 U/L (ref 5–33)
ANION GAP SERPL CALCULATED.3IONS-SCNC: 11 MMOL/L (ref 7–19)
AST SERPL-CCNC: 45 U/L (ref 5–32)
BILIRUB SERPL-MCNC: 0.6 MG/DL (ref 0.2–1.2)
BUN SERPL-MCNC: 8 MG/DL (ref 6–20)
CALCIUM SERPL-MCNC: 9.7 MG/DL (ref 8.6–10)
CHLORIDE SERPL-SCNC: 98 MMOL/L (ref 98–111)
CO2 SERPL-SCNC: 30 MMOL/L (ref 22–29)
CREAT SERPL-MCNC: 0.7 MG/DL (ref 0.5–0.9)
GLUCOSE SERPL-MCNC: 101 MG/DL (ref 74–109)
HBA1C MFR BLD: 6.7 % (ref 4–6)
INFLUENZA A ANTIGEN, POC: NEGATIVE
INFLUENZA B ANTIGEN, POC: NEGATIVE
LOT EXPIRE DATE: NORMAL
LOT KIT NUMBER: NORMAL
POTASSIUM SERPL-SCNC: 3.6 MMOL/L (ref 3.5–5)
PROT SERPL-MCNC: 7.6 G/DL (ref 6.6–8.7)
SARS-COV-2, POC: NORMAL
SODIUM SERPL-SCNC: 139 MMOL/L (ref 136–145)
VALID INTERNAL CONTROL: NORMAL
VENDOR AND KIT NAME POC: NORMAL

## 2024-08-22 PROCEDURE — 87428 SARSCOV & INF VIR A&B AG IA: CPT | Performed by: NURSE PRACTITIONER

## 2024-08-22 PROCEDURE — 3074F SYST BP LT 130 MM HG: CPT | Performed by: NURSE PRACTITIONER

## 2024-08-22 PROCEDURE — 99213 OFFICE O/P EST LOW 20 MIN: CPT | Performed by: NURSE PRACTITIONER

## 2024-08-22 PROCEDURE — 3079F DIAST BP 80-89 MM HG: CPT | Performed by: NURSE PRACTITIONER

## 2024-08-22 RX ORDER — DEXTROMETHORPHAN HYDROBROMIDE AND PROMETHAZINE HYDROCHLORIDE 15; 6.25 MG/5ML; MG/5ML
5 SYRUP ORAL 4 TIMES DAILY PRN
Qty: 180 ML | Refills: 0 | Status: SHIPPED | OUTPATIENT
Start: 2024-08-22 | End: 2024-08-29

## 2024-08-22 RX ORDER — DOXYCYCLINE HYCLATE 100 MG
100 TABLET ORAL 2 TIMES DAILY
Qty: 20 TABLET | Refills: 0 | Status: SHIPPED | OUTPATIENT
Start: 2024-08-22 | End: 2024-08-22

## 2024-08-22 RX ORDER — DEXTROMETHORPHAN HYDROBROMIDE AND PROMETHAZINE HYDROCHLORIDE 15; 6.25 MG/5ML; MG/5ML
5 SYRUP ORAL 4 TIMES DAILY PRN
Qty: 180 ML | Refills: 0 | Status: SHIPPED | OUTPATIENT
Start: 2024-08-22 | End: 2024-08-22

## 2024-08-22 RX ORDER — PREDNISONE 20 MG/1
20 TABLET ORAL 2 TIMES DAILY
Qty: 10 TABLET | Refills: 0 | Status: SHIPPED | OUTPATIENT
Start: 2024-08-22 | End: 2024-08-22

## 2024-08-22 RX ORDER — DOXYCYCLINE HYCLATE 100 MG
100 TABLET ORAL 2 TIMES DAILY
Qty: 20 TABLET | Refills: 0 | Status: SHIPPED | OUTPATIENT
Start: 2024-08-22 | End: 2024-09-01

## 2024-08-22 RX ORDER — PREDNISONE 20 MG/1
20 TABLET ORAL 2 TIMES DAILY
Qty: 10 TABLET | Refills: 0 | Status: SHIPPED | OUTPATIENT
Start: 2024-08-22 | End: 2024-08-27

## 2024-08-22 ASSESSMENT — ENCOUNTER SYMPTOMS
PHOTOPHOBIA: 0
COLOR CHANGE: 0
VOMITING: 0
SHORTNESS OF BREATH: 0
VOICE CHANGE: 0
NAUSEA: 0
RHINORRHEA: 0
BACK PAIN: 0
COUGH: 1

## 2024-08-22 NOTE — PATIENT INSTRUCTIONS
Begin prednisone 20 mg twice daily for 5 days.   Doxycycline as directed.   Promethazine DM for cough.   Continue mucinex; increase water intake.   Follow-up if not improving.

## 2024-08-22 NOTE — PROGRESS NOTES
[facility]  [address]  [address]  [place] [address]  Dept: [phone]  Dept Fax: [phone]  Loc: [phone]    Carina Alvarado is a 54 y.o. female who presents today for her medical conditions/complaints as noted below.  Carina Alvarado is c/o of URI (Pt in office with cold symptoms - chest congestion, productive cough with tinted mucus, sore throat, and fatigue)        HPI:     HPI   Chief Complaint   Patient presents with    URI     Pt in office with cold symptoms - chest congestion, productive cough with tinted mucus, sore throat, and fatigue     Patient presents today for evaluation of chest congestion, productive cough and fatigue. She reports symptoms have been present for 3 weeks. She denies fever. She has taken decadron injection and Augmentin from Urgent Care on 24 for symptoms or URI. She has also taken mucinex and antihistamine. She continues to have thick green sputum production with cough. She complains of sore throat. She was negative for strep/covid/flu.       Past Medical History:   Diagnosis Date    Allergic rhinitis 11/10/2021    Anxiety 2020    Asthma     Chronic back pain 2018    Chronic migraine without aura, intractable, without status migrainosus     Colon polyps     Depression 2018    Elevated blood pressure, situational 2018    Essential tremor     Fibromyalgia     Liver disease     Fatty Liver    Meniere disease 2017    Osteoarthritis     Sciatica of right side 2020    Tinnitus       Past Surgical History:   Procedure Laterality Date     SECTION      CHOLECYSTECTOMY, LAPAROSCOPIC  2015    Dr. Dennis    COLONOSCOPY  ?    ?    HYSTERECTOMY (CERVIX STATUS UNKNOWN)  1998    LIVER BIOPSY  2015    Dr. Dennis           2024     9:13 AM 2024     8:53 AM 2024     1:00 PM 2024     1:18 PM 2024     1:46 PM 2024     1:37 PM   Vitals   SYSTOLIC 120 126 118 130 116 106

## 2024-09-04 ENCOUNTER — OFFICE VISIT (OUTPATIENT)
Dept: PRIMARY CARE CLINIC | Age: 54
End: 2024-09-04
Payer: COMMERCIAL

## 2024-09-04 VITALS
HEIGHT: 65 IN | SYSTOLIC BLOOD PRESSURE: 124 MMHG | BODY MASS INDEX: 25.16 KG/M2 | DIASTOLIC BLOOD PRESSURE: 84 MMHG | HEART RATE: 72 BPM | TEMPERATURE: 97.6 F | OXYGEN SATURATION: 98 % | WEIGHT: 151 LBS

## 2024-09-04 DIAGNOSIS — I10 ESSENTIAL HYPERTENSION: Primary | ICD-10-CM

## 2024-09-04 DIAGNOSIS — M54.40 CHRONIC BILATERAL LOW BACK PAIN WITH SCIATICA, SCIATICA LATERALITY UNSPECIFIED: ICD-10-CM

## 2024-09-04 DIAGNOSIS — E11.9 TYPE 2 DIABETES MELLITUS WITHOUT COMPLICATION, WITHOUT LONG-TERM CURRENT USE OF INSULIN (HCC): ICD-10-CM

## 2024-09-04 DIAGNOSIS — F41.9 ANXIETY: ICD-10-CM

## 2024-09-04 DIAGNOSIS — G89.29 CHRONIC BILATERAL LOW BACK PAIN WITH SCIATICA, SCIATICA LATERALITY UNSPECIFIED: ICD-10-CM

## 2024-09-04 PROCEDURE — 3044F HG A1C LEVEL LT 7.0%: CPT | Performed by: NURSE PRACTITIONER

## 2024-09-04 PROCEDURE — 99215 OFFICE O/P EST HI 40 MIN: CPT | Performed by: NURSE PRACTITIONER

## 2024-09-04 PROCEDURE — 3079F DIAST BP 80-89 MM HG: CPT | Performed by: NURSE PRACTITIONER

## 2024-09-04 PROCEDURE — 96372 THER/PROPH/DIAG INJ SC/IM: CPT | Performed by: NURSE PRACTITIONER

## 2024-09-04 PROCEDURE — 3074F SYST BP LT 130 MM HG: CPT | Performed by: NURSE PRACTITIONER

## 2024-09-04 RX ORDER — METHYLPREDNISOLONE 4 MG
TABLET, DOSE PACK ORAL
Qty: 1 KIT | Refills: 0 | Status: SHIPPED | OUTPATIENT
Start: 2024-09-04

## 2024-09-04 RX ORDER — KETOROLAC TROMETHAMINE 30 MG/ML
60 INJECTION, SOLUTION INTRAMUSCULAR; INTRAVENOUS ONCE
Status: COMPLETED | OUTPATIENT
Start: 2024-09-04 | End: 2024-09-04

## 2024-09-04 RX ORDER — DESVENLAFAXINE 25 MG/1
TABLET, EXTENDED RELEASE ORAL
Qty: 90 TABLET | Refills: 0 | Status: SHIPPED | OUTPATIENT
Start: 2024-09-04

## 2024-09-04 RX ADMIN — KETOROLAC TROMETHAMINE 60 MG: 30 INJECTION, SOLUTION INTRAMUSCULAR; INTRAVENOUS at 14:55

## 2024-09-04 ASSESSMENT — ENCOUNTER SYMPTOMS
COUGH: 0
RHINORRHEA: 0
VOICE CHANGE: 0
BACK PAIN: 1
PHOTOPHOBIA: 0
VOMITING: 0
NAUSEA: 0
SHORTNESS OF BREATH: 0
COLOR CHANGE: 0

## 2024-09-04 NOTE — PROGRESS NOTES
[facility]  [address]  [address]  [place] [address]  Dept: [phone]  Dept Fax: [phone]  Loc: [phone]    Carina Alvarado is a 54 y.o. female who presents today for her medical conditions/complaints as noted below.  Carina Alvarado is c/o of Follow-up (Pt in office for 2 month follow up - stated that her back pain from scoliosis and arthritis has started to radiate towards the front and down her legs. )      HPI:     HPI   Chief Complaint   Patient presents with    Follow-up     Pt in office for 2 month follow up - stated that her back pain from scoliosis and arthritis has started to radiate towards the front and down her legs.      Patient presents today for follow-up hypertension, anxiety/depression, hypothyroidism, GERD, hyperlipidemia, diabetes. Her blood pressure is well-controlled with medication. She denies SOB or chest pain.     She has been taking Ozempic 0.5 mg. She has lost 6lbs in the last 2 months. Her A1C has decreased from 8.5 to 6.7. She states insurance is not covering for some reason.     She complains of worsening back pain. She has history of DDD, arthritis and scoliosis. She has taken ibuprofen for symptoms. She states pain radiates down bilateral legs. She denies any change in bowel or bladder behavior. She states symptoms have gotten worse for the last week. She denies any recent injuries.    She is due for mammogram.     She is now taking 2.5 mg amlodipine nightly; blood pressure has remained normal with decreasing this. She would like to continue to decrease number of meds.   She is now taking buspar PRN.   She decreased rosuvastatin to 20 mg.   She is taking 20 mg protonix; she states no issues with GERD.       Past Medical History:   Diagnosis Date    Allergic rhinitis 11/10/2021    Anxiety 6/4/2020    Asthma     Chronic back pain 7/29/2018    Chronic migraine without aura, intractable, without status migrainosus     Colon

## 2024-09-04 NOTE — PATIENT INSTRUCTIONS
Continue Ozempic 0.5 mg weekly.   Mammogram.   Follow-up in 3 months or sooner if needed.   Toradol injection today.   Tizanidine as needed for back pain.   If back pain not improving begin medrol dose pack.   Discontinue protonix and amlodipine.   Decrease pristiq to 25 mg daily.

## 2024-09-09 ENCOUNTER — HOSPITAL ENCOUNTER (OUTPATIENT)
Dept: WOMENS IMAGING | Age: 54
Discharge: HOME | End: 2024-09-09
Payer: COMMERCIAL

## 2024-09-09 DIAGNOSIS — Z12.31 ENCOUNTER FOR SCREENING MAMMOGRAM FOR BREAST CANCER: ICD-10-CM

## 2024-09-09 DIAGNOSIS — Z00.00 ENCOUNTER FOR ANNUAL PHYSICAL EXAM: ICD-10-CM

## 2024-09-09 PROCEDURE — 77063 BREAST TOMOSYNTHESIS BI: CPT

## 2024-09-19 DIAGNOSIS — G45.9 TIA (TRANSIENT ISCHEMIC ATTACK): ICD-10-CM

## 2024-09-20 RX ORDER — CLOPIDOGREL BISULFATE 75 MG/1
75 TABLET ORAL EVERY EVENING
Qty: 30 TABLET | Refills: 1 | Status: SHIPPED | OUTPATIENT
Start: 2024-09-20 | End: 2024-11-19

## 2024-09-20 RX ORDER — METOPROLOL SUCCINATE 50 MG/1
50 TABLET, EXTENDED RELEASE ORAL DAILY
Qty: 30 TABLET | Refills: 0 | Status: SHIPPED | OUTPATIENT
Start: 2024-09-20

## 2024-09-22 ENCOUNTER — OFFICE VISIT (OUTPATIENT)
Age: 54
End: 2024-09-22
Payer: COMMERCIAL

## 2024-09-22 VITALS
RESPIRATION RATE: 20 BRPM | OXYGEN SATURATION: 98 % | TEMPERATURE: 97.4 F | HEIGHT: 65 IN | DIASTOLIC BLOOD PRESSURE: 70 MMHG | WEIGHT: 153 LBS | SYSTOLIC BLOOD PRESSURE: 124 MMHG | HEART RATE: 68 BPM | BODY MASS INDEX: 25.49 KG/M2

## 2024-09-22 DIAGNOSIS — B02.9 HERPES ZOSTER WITHOUT COMPLICATION: Primary | ICD-10-CM

## 2024-09-22 PROCEDURE — 99213 OFFICE O/P EST LOW 20 MIN: CPT | Performed by: PHYSICIAN ASSISTANT

## 2024-09-22 PROCEDURE — 3078F DIAST BP <80 MM HG: CPT | Performed by: PHYSICIAN ASSISTANT

## 2024-09-22 PROCEDURE — 3074F SYST BP LT 130 MM HG: CPT | Performed by: PHYSICIAN ASSISTANT

## 2024-09-22 RX ORDER — ACYCLOVIR 50 MG/G
OINTMENT TOPICAL
Qty: 30 G | Refills: 0 | Status: SHIPPED | OUTPATIENT
Start: 2024-09-22

## 2024-09-22 RX ORDER — VALACYCLOVIR HYDROCHLORIDE 1 G/1
1000 TABLET, FILM COATED ORAL 3 TIMES DAILY
Qty: 30 TABLET | Refills: 0 | Status: SHIPPED | OUTPATIENT
Start: 2024-09-22 | End: 2024-10-02

## 2024-10-21 NOTE — TELEPHONE ENCOUNTER
Carina Alvarado called to request a refill on her medication.      Last office visit : 9/4/2024   Next office visit : 11/4/2024     Requested Prescriptions     Pending Prescriptions Disp Refills    metoprolol succinate (TOPROL XL) 50 MG extended release tablet [Pharmacy Med Name: METOPROLOL SUCCINATE ER 50MG TABLET EXTENDED RELEASE 24 HOUR] 30 tablet 0     Sig: TAKE 1 TABLET BY MOUTH ONCE DAILY    pantoprazole (PROTONIX) 20 MG tablet [Pharmacy Med Name: PANTOPRAZOLE SODIUM 20MG TABLET DELAYED RELEASE] 30 tablet 3     Sig: TAKE 1 TABLET BY MOUTH ONCE DAILY    rosuvastatin (CRESTOR) 40 MG tablet [Pharmacy Med Name: ROSUVASTATIN CALCIUM 40MG TABLET] 30 tablet 3     Sig: TAKE 1 TABLET BY MOUTH ONCE NIGHTLY    metFORMIN (GLUCOPHAGE) 500 MG tablet [Pharmacy Med Name: METFORMIN HYDROCHLORIDE 500MG TABLET] 60 tablet 0     Sig: TAKE 1 TABLET BY MOUTH TWICE DAILY WITH MEALS            Bernice Goodman MA

## 2024-10-22 RX ORDER — ROSUVASTATIN CALCIUM 40 MG/1
TABLET, COATED ORAL
Qty: 30 TABLET | Refills: 3 | Status: SHIPPED | OUTPATIENT
Start: 2024-10-22

## 2024-10-22 RX ORDER — PANTOPRAZOLE SODIUM 20 MG/1
TABLET, DELAYED RELEASE ORAL
Qty: 30 TABLET | Refills: 3 | Status: SHIPPED | OUTPATIENT
Start: 2024-10-22

## 2024-10-22 RX ORDER — METOPROLOL SUCCINATE 50 MG/1
50 TABLET, EXTENDED RELEASE ORAL DAILY
Qty: 30 TABLET | Refills: 0 | Status: SHIPPED | OUTPATIENT
Start: 2024-10-22

## 2024-11-04 ENCOUNTER — OFFICE VISIT (OUTPATIENT)
Dept: PRIMARY CARE CLINIC | Age: 54
End: 2024-11-04
Payer: COMMERCIAL

## 2024-11-04 VITALS
DIASTOLIC BLOOD PRESSURE: 80 MMHG | OXYGEN SATURATION: 96 % | HEART RATE: 73 BPM | HEIGHT: 65 IN | WEIGHT: 153 LBS | TEMPERATURE: 97.1 F | SYSTOLIC BLOOD PRESSURE: 126 MMHG | BODY MASS INDEX: 25.49 KG/M2

## 2024-11-04 DIAGNOSIS — E11.9 TYPE 2 DIABETES MELLITUS WITHOUT COMPLICATION, WITHOUT LONG-TERM CURRENT USE OF INSULIN (HCC): ICD-10-CM

## 2024-11-04 DIAGNOSIS — I10 ESSENTIAL HYPERTENSION: Primary | ICD-10-CM

## 2024-11-04 DIAGNOSIS — F41.9 ANXIETY: ICD-10-CM

## 2024-11-04 DIAGNOSIS — E78.2 MIXED HYPERLIPIDEMIA: ICD-10-CM

## 2024-11-04 PROCEDURE — 3074F SYST BP LT 130 MM HG: CPT | Performed by: NURSE PRACTITIONER

## 2024-11-04 PROCEDURE — 3079F DIAST BP 80-89 MM HG: CPT | Performed by: NURSE PRACTITIONER

## 2024-11-04 PROCEDURE — 3044F HG A1C LEVEL LT 7.0%: CPT | Performed by: NURSE PRACTITIONER

## 2024-11-04 PROCEDURE — 99214 OFFICE O/P EST MOD 30 MIN: CPT | Performed by: NURSE PRACTITIONER

## 2024-11-04 RX ORDER — CYCLOBENZAPRINE HCL 10 MG
10 TABLET ORAL 3 TIMES DAILY PRN
Qty: 30 TABLET | Refills: 0 | Status: SHIPPED | OUTPATIENT
Start: 2024-11-04 | End: 2024-11-14

## 2024-11-04 ASSESSMENT — ENCOUNTER SYMPTOMS
SHORTNESS OF BREATH: 0
PHOTOPHOBIA: 0
NAUSEA: 0
BACK PAIN: 0
COUGH: 0
RHINORRHEA: 0
VOICE CHANGE: 0
VOMITING: 0
COLOR CHANGE: 0

## 2024-11-04 NOTE — PROGRESS NOTES
[facility]  [address]  [address]  [place] [address]  Dept: [phone]  Dept Fax: [phone]  Loc: [phone]    Carina Alvarado is a 54 y.o. female who presents today for her medical conditions/complaints as noted below.  Carina Alvarado is c/o of 6 Month Follow-Up (Pt in office for 6 month follow up - stated concerns with tenderness and weakness in right elbow that started about a month ago)        HPI:     HPI   Chief Complaint   Patient presents with    6 Month Follow-Up     Pt in office for 6 month follow up - stated concerns with tenderness and weakness in right elbow that started about a month ago     Patient presents today for follow-up diabetes, hypertension, hyperlipidemia, anxiety/depression, GERD, hypothyroidism. Blood pressure is well-controlled. She denies chest pain or SOB.     She had been taking taking Ozempic; she reports blood sugar has been in normal range. She has lost approximately 10lbs this year. She states insurance would no longer cover Ozempic. She is interested in doing the compound semaglutide at [facility].     She complains of right elbow \"weak and tender\" for the last 6 months. She denies any known injury. She has taken ibuprofen and muscle relaxer for symptoms. 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 She has used brace without much relief.     Past Medical History:   Diagnosis Date    Allergic rhinitis 11/10/2021    Anxiety 2020    Asthma     Chronic back pain 2018    Chronic migraine without aura, intractable, without status migrainosus     Colon polyps     Depression 2018    Elevated blood pressure, situational 2018    Essential tremor     Fibromyalgia     Liver disease     Fatty Liver    Meniere disease 2017    Osteoarthritis     Sciatica of right side 2020    Tinnitus       Past Surgical History:   Procedure Laterality Date

## 2024-11-04 NOTE — PATIENT INSTRUCTIONS
Follow-up in 3 months with fasting labs prior to visit.   Begin semaglutide 0.25 mg weekly (Craig Watson).   Flexeril 10 mg every 8 hours as needed.

## 2024-11-19 DIAGNOSIS — G89.29 OTHER CHRONIC PAIN: ICD-10-CM

## 2024-11-19 DIAGNOSIS — G45.9 TIA (TRANSIENT ISCHEMIC ATTACK): ICD-10-CM

## 2024-11-19 DIAGNOSIS — I10 HYPERTENSION, ESSENTIAL, BENIGN: ICD-10-CM

## 2024-11-19 DIAGNOSIS — M54.40 CHRONIC BILATERAL LOW BACK PAIN WITH SCIATICA, SCIATICA LATERALITY UNSPECIFIED: ICD-10-CM

## 2024-11-19 DIAGNOSIS — G89.29 CHRONIC BILATERAL LOW BACK PAIN WITH SCIATICA, SCIATICA LATERALITY UNSPECIFIED: ICD-10-CM

## 2024-11-19 NOTE — TELEPHONE ENCOUNTER
Carina Alvarado called to request a refill on her medication.      Last office visit : 11/4/2024   Next office visit : 2/4/2025     Requested Prescriptions     Pending Prescriptions Disp Refills    metoprolol succinate (TOPROL XL) 50 MG extended release tablet [Pharmacy Med Name: METOPROLOL SUCCINATE ER 50MG TABLET EXTENDED RELEASE 24 HOUR] 30 tablet 0     Sig: TAKE 1 TABLET BY MOUTH ONCE DAILY    amitriptyline (ELAVIL) 25 MG tablet [Pharmacy Med Name: AMITRIPTYLINE HYDROCHLORIDE 25MG TABLET] 60 tablet 5     Sig: TAKE (2) TABLETS BY MOUTH DAILY AT BEDTIME.    amLODIPine (NORVASC) 5 MG tablet [Pharmacy Med Name: AMLODIPINE BESYLATE 5MG TABLET] 30 tablet 5     Sig: TAKE 1 TABLET BY MOUTH NIGHTLY    clopidogrel (PLAVIX) 75 MG tablet [Pharmacy Med Name: CLOPIDOGREL 75MG TABLET] 30 tablet 1     Sig: TAKE 1 TABLET BY MOUTH ONCE DAILY IN THE EVENING    desvenlafaxine succinate (PRISTIQ) 50 MG TB24 extended release tablet [Pharmacy Med Name: DESVENLAFAXINE ER 50MG TABLET EXTENDED RELEASE 24 HOUR] 30 tablet 3     Sig: TAKE 1 TABLET BY MOUTH ONCE DAILY    hydroCHLOROthiazide (HYDRODIURIL) 25 MG tablet [Pharmacy Med Name: HYDROCHLOROTHIAZIDE 25MG TABLET] 30 tablet 5     Sig: TAKE 1 TABLET BY MOUTH ONCE DAILY    levothyroxine (SYNTHROID) 50 MCG tablet [Pharmacy Med Name: LEVOTHYROXINE SODIUM 50MCG TABLET] 30 tablet 2     Sig: TAKE 1 TABLET BY MOUTH ONCE DAILY    metFORMIN (GLUCOPHAGE) 500 MG tablet [Pharmacy Med Name: METFORMIN HYDROCHLORIDE 500MG TABLET] 60 tablet 0     Sig: TAKE 1 TABLET BY MOUTH TWICE DAILY WITH MEALS            Bernice Goodman MA

## 2024-11-20 RX ORDER — AMLODIPINE BESYLATE 5 MG/1
5 TABLET ORAL NIGHTLY
Qty: 30 TABLET | Refills: 5 | Status: SHIPPED | OUTPATIENT
Start: 2024-11-20

## 2024-11-20 RX ORDER — CLOPIDOGREL BISULFATE 75 MG/1
75 TABLET ORAL NIGHTLY
Qty: 30 TABLET | Refills: 1 | Status: SHIPPED | OUTPATIENT
Start: 2024-11-20

## 2024-11-20 RX ORDER — METOPROLOL SUCCINATE 50 MG/1
50 TABLET, EXTENDED RELEASE ORAL DAILY
Qty: 30 TABLET | Refills: 0 | Status: SHIPPED | OUTPATIENT
Start: 2024-11-20

## 2024-11-20 RX ORDER — LEVOTHYROXINE SODIUM 50 UG/1
50 TABLET ORAL DAILY
Qty: 30 TABLET | Refills: 2 | Status: SHIPPED | OUTPATIENT
Start: 2024-11-20

## 2024-11-20 RX ORDER — HYDROCHLOROTHIAZIDE 25 MG/1
25 TABLET ORAL DAILY
Qty: 30 TABLET | Refills: 5 | Status: SHIPPED | OUTPATIENT
Start: 2024-11-20

## 2024-11-20 RX ORDER — DESVENLAFAXINE 50 MG/1
TABLET, FILM COATED, EXTENDED RELEASE ORAL
Qty: 30 TABLET | Refills: 3 | Status: SHIPPED | OUTPATIENT
Start: 2024-11-20

## 2024-12-23 RX ORDER — METOPROLOL SUCCINATE 50 MG/1
50 TABLET, EXTENDED RELEASE ORAL DAILY
Qty: 30 TABLET | Refills: 1 | Status: SHIPPED | OUTPATIENT
Start: 2024-12-23

## 2025-01-21 DIAGNOSIS — G45.9 TIA (TRANSIENT ISCHEMIC ATTACK): ICD-10-CM

## 2025-01-21 NOTE — TELEPHONE ENCOUNTER
Carina Alvarado called to request a refill on her medication.      Last office visit : 11/4/2024   Next office visit : 2/4/2025     Requested Prescriptions     Pending Prescriptions Disp Refills    clopidogrel (PLAVIX) 75 MG tablet [Pharmacy Med Name: CLOPIDOGREL 75MG TABLET] 30 tablet 1     Sig: TAKE 1 TABLET BY MOUTH ONCE DAILY IN THE EVENING            Fito Ordonez MA

## 2025-01-22 RX ORDER — CLOPIDOGREL BISULFATE 75 MG/1
75 TABLET ORAL NIGHTLY
Qty: 30 TABLET | Refills: 1 | Status: SHIPPED | OUTPATIENT
Start: 2025-01-22

## 2025-02-28 RX ORDER — PANTOPRAZOLE SODIUM 20 MG/1
TABLET, DELAYED RELEASE ORAL
Qty: 30 TABLET | Refills: 0 | Status: SHIPPED | OUTPATIENT
Start: 2025-02-28

## 2025-02-28 RX ORDER — LEVOTHYROXINE SODIUM 50 UG/1
50 TABLET ORAL DAILY
Qty: 30 TABLET | Refills: 0 | Status: SHIPPED | OUTPATIENT
Start: 2025-02-28

## 2025-02-28 RX ORDER — ROSUVASTATIN CALCIUM 40 MG/1
TABLET, COATED ORAL
Qty: 30 TABLET | Refills: 0 | Status: SHIPPED | OUTPATIENT
Start: 2025-02-28

## 2025-02-28 RX ORDER — METOPROLOL SUCCINATE 50 MG/1
50 TABLET, EXTENDED RELEASE ORAL DAILY
Qty: 30 TABLET | Refills: 0 | Status: SHIPPED | OUTPATIENT
Start: 2025-02-28

## 2025-02-28 NOTE — TELEPHONE ENCOUNTER
Carina Alvarado called to request a refill on her medication.      Last office visit : 11/4/2024   Next office visit : 3/11/2025     Requested Prescriptions     Pending Prescriptions Disp Refills    metoprolol succinate (TOPROL XL) 50 MG extended release tablet [Pharmacy Med Name: METOPROLOL SUCCINATE ER 50MG TABLET EXTENDED RELEASE 24 HOUR] 30 tablet 0     Sig: TAKE 1 TABLET BY MOUTH ONCE DAILY    pantoprazole (PROTONIX) 20 MG tablet [Pharmacy Med Name: PANTOPRAZOLE SODIUM 20MG TABLET DELAYED RELEASE] 30 tablet 0     Sig: TAKE 1 TABLET BY MOUTH ONCE DAILY    rosuvastatin (CRESTOR) 40 MG tablet [Pharmacy Med Name: ROSUVASTATIN CALCIUM 40MG TABLET] 30 tablet 0     Sig: TAKE 1 TABLET BY MOUTH ONCE NIGHTLY    levothyroxine (SYNTHROID) 50 MCG tablet [Pharmacy Med Name: LEVOTHYROXINE SODIUM 50MCG TABLET] 30 tablet 0     Sig: TAKE 1 TABLET BY MOUTH ONCE DAILY    metFORMIN (GLUCOPHAGE) 500 MG tablet [Pharmacy Med Name: METFORMIN HYDROCHLORIDE 500MG TABLET] 60 tablet 0     Sig: TAKE 1 TABLET BY MOUTH TWICE DAILY WITH MEALS            Fito Ordonez MA

## 2025-03-31 DIAGNOSIS — G45.9 TIA (TRANSIENT ISCHEMIC ATTACK): ICD-10-CM

## 2025-04-02 RX ORDER — METOPROLOL SUCCINATE 50 MG/1
50 TABLET, EXTENDED RELEASE ORAL DAILY
Qty: 30 TABLET | Refills: 0 | Status: SHIPPED | OUTPATIENT
Start: 2025-04-02

## 2025-04-02 RX ORDER — CLOPIDOGREL BISULFATE 75 MG/1
75 TABLET ORAL NIGHTLY
Qty: 30 TABLET | Refills: 1 | Status: SHIPPED | OUTPATIENT
Start: 2025-04-02

## 2025-04-02 RX ORDER — LEVOTHYROXINE SODIUM 50 UG/1
50 TABLET ORAL DAILY
Qty: 30 TABLET | Refills: 0 | Status: SHIPPED | OUTPATIENT
Start: 2025-04-02

## 2025-04-02 RX ORDER — ROSUVASTATIN CALCIUM 40 MG/1
TABLET, COATED ORAL
Qty: 30 TABLET | Refills: 0 | Status: SHIPPED | OUTPATIENT
Start: 2025-04-02

## 2025-04-02 NOTE — TELEPHONE ENCOUNTER
Carina Alvarado called to request a refill on her medication.      Last office visit : 11/4/2024   Next office visit : 4/14/2025     Requested Prescriptions     Pending Prescriptions Disp Refills    metoprolol succinate (TOPROL XL) 50 MG extended release tablet [Pharmacy Med Name: METOPROLOL SUCCINATE ER 50MG TABLET EXTENDED RELEASE 24 HOUR] 30 tablet 0     Sig: TAKE 1 TABLET BY MOUTH ONCE DAILY    clopidogrel (PLAVIX) 75 MG tablet [Pharmacy Med Name: CLOPIDOGREL 75MG TABLET] 30 tablet 1     Sig: TAKE 1 TABLET BY MOUTH ONCE DAILY IN THE EVENING    rosuvastatin (CRESTOR) 40 MG tablet [Pharmacy Med Name: ROSUVASTATIN CALCIUM 40MG TABLET] 30 tablet 0     Sig: TAKE 1 TABLET BY MOUTH ONCE NIGHTLY    levothyroxine (SYNTHROID) 50 MCG tablet [Pharmacy Med Name: LEVOTHYROXINE SODIUM 50MCG TABLET] 30 tablet 0     Sig: TAKE 1 TABLET BY MOUTH ONCE DAILY    metFORMIN (GLUCOPHAGE) 500 MG tablet [Pharmacy Med Name: METFORMIN HYDROCHLORIDE 500MG TABLET] 60 tablet 0     Sig: TAKE 1 TABLET BY MOUTH TWICE DAILY WITH MEALS            Sarah Frank MA

## 2025-04-13 SDOH — ECONOMIC STABILITY: FOOD INSECURITY: WITHIN THE PAST 12 MONTHS, THE FOOD YOU BOUGHT JUST DIDN'T LAST AND YOU DIDN'T HAVE MONEY TO GET MORE.: NEVER TRUE

## 2025-04-13 SDOH — ECONOMIC STABILITY: INCOME INSECURITY: IN THE LAST 12 MONTHS, WAS THERE A TIME WHEN YOU WERE NOT ABLE TO PAY THE MORTGAGE OR RENT ON TIME?: NO

## 2025-04-13 SDOH — ECONOMIC STABILITY: FOOD INSECURITY: WITHIN THE PAST 12 MONTHS, YOU WORRIED THAT YOUR FOOD WOULD RUN OUT BEFORE YOU GOT MONEY TO BUY MORE.: NEVER TRUE

## 2025-04-13 SDOH — ECONOMIC STABILITY: TRANSPORTATION INSECURITY
IN THE PAST 12 MONTHS, HAS THE LACK OF TRANSPORTATION KEPT YOU FROM MEDICAL APPOINTMENTS OR FROM GETTING MEDICATIONS?: NO

## 2025-04-13 ASSESSMENT — PATIENT HEALTH QUESTIONNAIRE - PHQ9
10. IF YOU CHECKED OFF ANY PROBLEMS, HOW DIFFICULT HAVE THESE PROBLEMS MADE IT FOR YOU TO DO YOUR WORK, TAKE CARE OF THINGS AT HOME, OR GET ALONG WITH OTHER PEOPLE: NOT DIFFICULT AT ALL
5. POOR APPETITE OR OVEREATING: NOT AT ALL
9. THOUGHTS THAT YOU WOULD BE BETTER OFF DEAD, OR OF HURTING YOURSELF: NOT AT ALL
6. FEELING BAD ABOUT YOURSELF - OR THAT YOU ARE A FAILURE OR HAVE LET YOURSELF OR YOUR FAMILY DOWN: NOT AT ALL
SUM OF ALL RESPONSES TO PHQ QUESTIONS 1-9: 0
3. TROUBLE FALLING OR STAYING ASLEEP: NOT AT ALL
SUM OF ALL RESPONSES TO PHQ QUESTIONS 1-9: 0
8. MOVING OR SPEAKING SO SLOWLY THAT OTHER PEOPLE COULD HAVE NOTICED. OR THE OPPOSITE, BEING SO FIGETY OR RESTLESS THAT YOU HAVE BEEN MOVING AROUND A LOT MORE THAN USUAL: NOT AT ALL
4. FEELING TIRED OR HAVING LITTLE ENERGY: NOT AT ALL
9. THOUGHTS THAT YOU WOULD BE BETTER OFF DEAD, OR OF HURTING YOURSELF: NOT AT ALL
2. FEELING DOWN, DEPRESSED OR HOPELESS: NOT AT ALL
SUM OF ALL RESPONSES TO PHQ QUESTIONS 1-9: 0
7. TROUBLE CONCENTRATING ON THINGS, SUCH AS READING THE NEWSPAPER OR WATCHING TELEVISION: NOT AT ALL
SUM OF ALL RESPONSES TO PHQ QUESTIONS 1-9: 0
1. LITTLE INTEREST OR PLEASURE IN DOING THINGS: NOT AT ALL
8. MOVING OR SPEAKING SO SLOWLY THAT OTHER PEOPLE COULD HAVE NOTICED. OR THE OPPOSITE - BEING SO FIDGETY OR RESTLESS THAT YOU HAVE BEEN MOVING AROUND A LOT MORE THAN USUAL: NOT AT ALL
SUM OF ALL RESPONSES TO PHQ QUESTIONS 1-9: 0
3. TROUBLE FALLING OR STAYING ASLEEP: NOT AT ALL
6. FEELING BAD ABOUT YOURSELF - OR THAT YOU ARE A FAILURE OR HAVE LET YOURSELF OR YOUR FAMILY DOWN: NOT AT ALL
1. LITTLE INTEREST OR PLEASURE IN DOING THINGS: NOT AT ALL
4. FEELING TIRED OR HAVING LITTLE ENERGY: NOT AT ALL
7. TROUBLE CONCENTRATING ON THINGS, SUCH AS READING THE NEWSPAPER OR WATCHING TELEVISION: NOT AT ALL
2. FEELING DOWN, DEPRESSED OR HOPELESS: NOT AT ALL
5. POOR APPETITE OR OVEREATING: NOT AT ALL
10. IF YOU CHECKED OFF ANY PROBLEMS, HOW DIFFICULT HAVE THESE PROBLEMS MADE IT FOR YOU TO DO YOUR WORK, TAKE CARE OF THINGS AT HOME, OR GET ALONG WITH OTHER PEOPLE: NOT DIFFICULT AT ALL

## 2025-04-14 ENCOUNTER — OFFICE VISIT (OUTPATIENT)
Dept: PRIMARY CARE CLINIC | Age: 55
End: 2025-04-14
Payer: COMMERCIAL

## 2025-04-14 ENCOUNTER — RESULTS FOLLOW-UP (OUTPATIENT)
Dept: PRIMARY CARE CLINIC | Age: 55
End: 2025-04-14

## 2025-04-14 VITALS
SYSTOLIC BLOOD PRESSURE: 132 MMHG | DIASTOLIC BLOOD PRESSURE: 84 MMHG | TEMPERATURE: 98.1 F | WEIGHT: 154.6 LBS | HEART RATE: 63 BPM | BODY MASS INDEX: 25.73 KG/M2 | OXYGEN SATURATION: 98 %

## 2025-04-14 DIAGNOSIS — E11.9 TYPE 2 DIABETES MELLITUS WITHOUT COMPLICATION, WITHOUT LONG-TERM CURRENT USE OF INSULIN: Primary | ICD-10-CM

## 2025-04-14 DIAGNOSIS — E78.2 MIXED HYPERLIPIDEMIA: ICD-10-CM

## 2025-04-14 DIAGNOSIS — F41.9 ANXIETY: ICD-10-CM

## 2025-04-14 DIAGNOSIS — E11.9 TYPE 2 DIABETES MELLITUS WITHOUT COMPLICATION, WITHOUT LONG-TERM CURRENT USE OF INSULIN (HCC): ICD-10-CM

## 2025-04-14 DIAGNOSIS — E03.9 HYPOTHYROIDISM, UNSPECIFIED TYPE: ICD-10-CM

## 2025-04-14 DIAGNOSIS — I10 ESSENTIAL HYPERTENSION: ICD-10-CM

## 2025-04-14 LAB
25(OH)D3 SERPL-MCNC: 33.6 NG/ML
ALBUMIN SERPL-MCNC: 4.7 G/DL (ref 3.5–5.2)
ALP SERPL-CCNC: 57 U/L (ref 35–104)
ALT SERPL-CCNC: 117 U/L (ref 10–35)
ANION GAP SERPL CALCULATED.3IONS-SCNC: 11 MMOL/L (ref 8–16)
AST SERPL-CCNC: 86 U/L (ref 10–35)
BASOPHILS # BLD: 0.1 K/UL (ref 0–0.2)
BASOPHILS NFR BLD: 1.3 % (ref 0–1)
BILIRUB SERPL-MCNC: 0.5 MG/DL (ref 0.2–1.2)
BUN SERPL-MCNC: 11 MG/DL (ref 6–20)
CALCIUM SERPL-MCNC: 10.8 MG/DL (ref 8.6–10)
CHLORIDE SERPL-SCNC: 101 MMOL/L (ref 98–107)
CHOLEST SERPL-MCNC: 157 MG/DL (ref 0–199)
CO2 SERPL-SCNC: 31 MMOL/L (ref 22–29)
CREAT SERPL-MCNC: 0.8 MG/DL (ref 0.5–0.9)
CREAT UR-MCNC: 32.8 MG/DL (ref 28–217)
EOSINOPHIL # BLD: 0.1 K/UL (ref 0–0.6)
EOSINOPHIL NFR BLD: 2.1 % (ref 0–5)
ERYTHROCYTE [DISTWIDTH] IN BLOOD BY AUTOMATED COUNT: 11.9 % (ref 11.5–14.5)
GLUCOSE SERPL-MCNC: 133 MG/DL (ref 70–99)
HBA1C MFR BLD: 6.9 % (ref 4–5.6)
HCT VFR BLD AUTO: 43.4 % (ref 37–47)
HDLC SERPL-MCNC: 34 MG/DL (ref 40–60)
HGB BLD-MCNC: 15 G/DL (ref 12–16)
IMM GRANULOCYTES # BLD: 0 K/UL
LDLC SERPL CALC-MCNC: 76 MG/DL
LYMPHOCYTES # BLD: 2.4 K/UL (ref 1.1–4.5)
LYMPHOCYTES NFR BLD: 38.8 % (ref 20–40)
MCH RBC QN AUTO: 29.9 PG (ref 27–31)
MCHC RBC AUTO-ENTMCNC: 34.6 G/DL (ref 33–37)
MCV RBC AUTO: 86.6 FL (ref 81–99)
MICROALBUMIN UR-MCNC: <1.2 MG/DL (ref 0–1.99)
MICROALBUMIN/CREAT UR-RTO: NORMAL MG/G (ref 0–29)
MONOCYTES # BLD: 0.6 K/UL (ref 0–0.9)
MONOCYTES NFR BLD: 8.9 % (ref 0–10)
NEUTROPHILS # BLD: 3 K/UL (ref 1.5–7.5)
NEUTS SEG NFR BLD: 48.7 % (ref 50–65)
PLATELET # BLD AUTO: 293 K/UL (ref 130–400)
PMV BLD AUTO: 10.8 FL (ref 9.4–12.3)
POTASSIUM SERPL-SCNC: 4.6 MMOL/L (ref 3.5–5.1)
PROT SERPL-MCNC: 7.4 G/DL (ref 6.4–8.3)
RBC # BLD AUTO: 5.01 M/UL (ref 4.2–5.4)
SODIUM SERPL-SCNC: 143 MMOL/L (ref 136–145)
TRIGL SERPL-MCNC: 237 MG/DL (ref 0–149)
TSH SERPL DL<=0.005 MIU/L-ACNC: 1.47 UIU/ML (ref 0.27–4.2)
WBC # BLD AUTO: 6.2 K/UL (ref 4.8–10.8)

## 2025-04-14 PROCEDURE — 99214 OFFICE O/P EST MOD 30 MIN: CPT | Performed by: NURSE PRACTITIONER

## 2025-04-14 PROCEDURE — 3075F SYST BP GE 130 - 139MM HG: CPT | Performed by: NURSE PRACTITIONER

## 2025-04-14 PROCEDURE — 3079F DIAST BP 80-89 MM HG: CPT | Performed by: NURSE PRACTITIONER

## 2025-04-14 RX ORDER — TIRZEPATIDE 2.5 MG/.5ML
2.5 INJECTION, SOLUTION SUBCUTANEOUS WEEKLY
Qty: 2 ML | Refills: 0 | Status: SHIPPED | OUTPATIENT
Start: 2025-04-14

## 2025-04-14 ASSESSMENT — ENCOUNTER SYMPTOMS
NAUSEA: 0
VOMITING: 0
BACK PAIN: 0
RHINORRHEA: 0
SHORTNESS OF BREATH: 0
VOICE CHANGE: 0
PHOTOPHOBIA: 0
COUGH: 0
COLOR CHANGE: 0

## 2025-04-14 NOTE — PROGRESS NOTES
General: Normal range of motion.      Cervical back: Normal range of motion and neck supple.   Skin:     General: Skin is warm and dry.   Neurological:      Mental Status: She is alert and oriented to person, place, and time.   Psychiatric:         Behavior: Behavior normal.       /84   Pulse 63   Temp 98.1 °F (36.7 °C) (Temporal)   Wt 70.1 kg (154 lb 9.6 oz)   SpO2 98%   BMI 25.73 kg/m²     Results  Labs   - Blood glucose test: 156 mg/dL before eating   - Blood glucose test: 138 mg/dL in the morning       Assessment:   Assessment & Plan   Assessment & Plan  1. Diabetes mellitus: Stable.  - Weight has remained stable over the past 6 to 9 months.  - Blood sugar levels have been monitored, with recent readings of 156 mg/dL and 138 mg/dL.  - Prescription for Mounjaro will be sent to [facility], pending prior authorization.  - Blood work has been ordered and should be completed; any issues with insurance coverage for Mounjaro should be reported to the office for further assistance.    2. Health maintenance.  - Colonoscopy was performed in 2023 at [place].  - This information will be updated in medical records.    Follow-up  - A follow-up appointment is scheduled in 1 month for a weight check and to monitor response to Mounjaro.      Diagnosis Orders   1. Type 2 diabetes mellitus without complication, without long-term current use of insulin  Tirzepatide (MOUNJARO) 2.5 MG/0.5ML SOAJ    Albumin/Creatinine Ratio, Urine      2. Essential hypertension        3. Mixed hyperlipidemia        4. Anxiety        5. Hypothyroidism, unspecified type          PDMP Monitoring:    Last PDMP Beck as Reviewed:  Review User Review Instant Review Result            Urine Drug Screenings (1 yr)       POCT Rapid Drug Screen  Collected: 4/26/2019  8:58 AM (Final result)              Urine Drug Screen  Collected: 5/9/2022 11:52 AM (Final result)                  Medication Contract and Consent for Opioid Use Documents Filed       Patient

## 2025-05-01 RX ORDER — METOPROLOL SUCCINATE 50 MG/1
50 TABLET, EXTENDED RELEASE ORAL DAILY
Qty: 30 TABLET | Refills: 5 | Status: SHIPPED | OUTPATIENT
Start: 2025-05-01

## 2025-05-01 RX ORDER — LEVOTHYROXINE SODIUM 50 UG/1
50 TABLET ORAL DAILY
Qty: 30 TABLET | Refills: 5 | Status: SHIPPED | OUTPATIENT
Start: 2025-05-01

## 2025-05-01 RX ORDER — ROSUVASTATIN CALCIUM 40 MG/1
TABLET, COATED ORAL
Qty: 30 TABLET | Refills: 5 | Status: SHIPPED | OUTPATIENT
Start: 2025-05-01

## 2025-05-08 NOTE — PROGRESS NOTES
Beverley Nevarez,   [facility]   Family Medicine  [address]  [place] [address]  Phone: [phone]  Fax: [phone]         Chief Complaint:  Chief Complaint   Patient presents with    Establish Care        History:  Chelsie Coughlin is a 55 y.o. female who presents today as a new patient to the clinic.     .     Has had multiple small TIAs.  Has been shown to have PACs, and is on metoprolol.  Feels like this controls her palpitations.  No issues with Plavix and aspirin.  She is also on statin, as well as 2 blood pressure medicines.    History of NAFLD has previously followed with specialist at [facility], Dr. Lyons.  Last biopsy was in .  Most recently liver enzymes doubled since blood work about 8 months ago.    History of type 2 diabetes.  Most recent A1c 1 month ago was 6.9.  He is currently on metformin and recently transition to Mounjaro after insurance stopped paying for Ozempic.    Hypothyroidism  Currently on Synthroid 50 mcg daily.  Recent TSH in April within normal limits.    Mood  She takes Pristiq 50 mg daily BuSpar 5 mg 3 times daily as needed, and amitriptyline at bedtime.  She has had significant trauma in her life, both her father and most recently her uncle committed suicide after a terminal medical diagnoses.  She is interested in going to therapy.    Colonoscopy  at [facility]. 5 year recall.  Mammogram completed 2024 and negative.  Previous hysterectomy.    HPI        Past Medical History:   Past Medical History:   Diagnosis Date    Fibromyalgia        Past Surgical History:   Past Surgical History:   Procedure Laterality Date     SECTION      HYSTERECTOMY         Family History:   Family History   Problem Relation Age of Onset    Cancer Mother     Cancer Father        Social History:    reports that she has never smoked. She has never used smokeless tobacco. Drug use questions deferred to the physician. She reports that she does not drink  "alcohol.    Current Medications:   Current Outpatient Medications   Medication Instructions    albuterol (PROVENTIL HFA;VENTOLIN HFA) 108 (90 BASE) MCG/ACT inhaler 2 puffs, Every 4 Hours PRN    amitriptyline (ELAVIL) 25 mg, Nightly    amLODIPine (NORVASC) 5 mg, Nightly    Aspirin EC Adult Low Dose 81 mg, Every Evening    Cholecalciferol 50 MCG (2000 UT) capsule 50 capsules, Daily    clopidogrel (PLAVIX) 75 mg, Every Evening    fluticasone (FLONASE) 50 MCG/ACT nasal spray INSTILL ONE SPRAY IN EACH NOSTRIL TWICE A DAY    fluticasone-salmeterol (ADVAIR DISKUS) 250-50 MCG/DOSE DISKUS 1 puff, 2 Times Daily - RT    hydroCHLOROthiazide 25 mg, Daily    levothyroxine (SYNTHROID, LEVOTHROID) 50 mcg, Daily    meclizine (ANTIVERT) 25 mg, Oral, 3 Times Daily PRN, Take one by mouth three times a day as needed for vertigo    metFORMIN (GLUCOPHAGE) 500 mg, 2 Times Daily With Meals    metoprolol succinate XL (TOPROL-XL) 50 mg, Daily    montelukast (SINGULAIR) 10 MG tablet TAKE ONE TABLET BY MOUTH EVERY EVENING    Mounjaro 2.5 mg, Subcutaneous, Weekly    rosuvastatin (CRESTOR) 40 mg, Nightly       Allergies:   Allergies   Allergen Reactions    Morphine And Codeine Anaphylaxis    Latex Itching and Swelling       OBJECTIVE:  /74   Pulse 78   Temp 97.4 °F (36.3 °C)   Ht 167.6 cm (66\")   Wt 66.2 kg (146 lb)   SpO2 98%   BMI 23.57 kg/m²      Gen: No acute distress.   Head and neck: Normocephalic, atraumatic.  HEENT: PERRLA, EOMI.  CV: Well perfused, no pallor.   Resp: Normal respiratory effort. No distress.   Musculoskeletal: No gross deformity.   Neuro: Alert and oriented.   Skin: No visible rash or erythema.   Psych: Appropriate.       Procedures    Assessment/Plan:     Diagnoses and all orders for this visit:    1. Type 2 diabetes mellitus with hyperglycemia, without long-term current use of insulin (Primary)  A1c 1 month ago 6.9.  She has done well on metformin, and recently transitioned from Ozempic to Mounjaro after " insurance stopped covering Ozempic.  She has had no side effects, but wants to stay at current dosing.  Will plan to recheck A1c in 3 months.  -     Hemoglobin A1c; Future  -     Mounjaro 2.5 MG/0.5ML solution auto-injector; Inject 2.5 mg under the skin into the appropriate area as directed 1 (One) Time Per Week.  Dispense: 2 mL; Refill: 1    2. Mixed hyperlipidemia  Reviewed most recent lipid panel, showed elevated triglycerides.  Similar to the past.  Discussed adding omega-3 supplement.  -     Lipid panel; Future    3. Elevated liver enzymes  History of NAFLD.  Previously following with specialist at [facility].  Recent uptick in liver enzymes compared to 8 months ago.  Discussed watching diet.  She does not drink any alcohol.  Family history of similar.  If repeat enzymes worse, consider referral to GI.  -     Comprehensive metabolic panel; Future    4. Major depressive disorder, recurrent episode, mild degree  She takes Pristiq 50 mg daily , and has buspar prn,along with amitriptyline at bedtime.  She has had significant trauma in her life, both her father and most recently her uncle committed suicide after a terminal medical diagnoses.  She is interested in going to therapy.  -     Ambulatory Referral to Behavioral Health    5. Hypothyroidism, unspecified type  Recent TSH reviewed and within normal limits.  Continue current Synthroid dosing-50 mcg.    6. Essential hypertension  7. TIA (transient ischemic attack)  Blood pressure stable, 110/74 today.  Having no side effects.  Continue.      An After Visit Summary was printed and given to the patient at discharge.  Return in about 3 months (around 8/12/2025) for Recheck DM.         Beverley Nevarez DO  5/12/2025   Electronically signed.

## 2025-05-12 ENCOUNTER — PATIENT ROUNDING (BHMG ONLY) (OUTPATIENT)
Dept: FAMILY MEDICINE CLINIC | Facility: CLINIC | Age: 55
End: 2025-05-12
Payer: COMMERCIAL

## 2025-05-12 ENCOUNTER — OFFICE VISIT (OUTPATIENT)
Dept: FAMILY MEDICINE CLINIC | Facility: CLINIC | Age: 55
End: 2025-05-12
Payer: COMMERCIAL

## 2025-05-12 VITALS
HEIGHT: 66 IN | OXYGEN SATURATION: 98 % | HEART RATE: 78 BPM | TEMPERATURE: 97.4 F | WEIGHT: 146 LBS | DIASTOLIC BLOOD PRESSURE: 74 MMHG | SYSTOLIC BLOOD PRESSURE: 110 MMHG | BODY MASS INDEX: 23.46 KG/M2

## 2025-05-12 DIAGNOSIS — E78.2 MIXED HYPERLIPIDEMIA: ICD-10-CM

## 2025-05-12 DIAGNOSIS — E03.9 HYPOTHYROIDISM, UNSPECIFIED TYPE: ICD-10-CM

## 2025-05-12 DIAGNOSIS — I10 ESSENTIAL HYPERTENSION: ICD-10-CM

## 2025-05-12 DIAGNOSIS — E11.65 TYPE 2 DIABETES MELLITUS WITH HYPERGLYCEMIA, WITHOUT LONG-TERM CURRENT USE OF INSULIN: Primary | ICD-10-CM

## 2025-05-12 DIAGNOSIS — G45.9 TIA (TRANSIENT ISCHEMIC ATTACK): ICD-10-CM

## 2025-05-12 DIAGNOSIS — R74.8 ELEVATED LIVER ENZYMES: ICD-10-CM

## 2025-05-12 DIAGNOSIS — F33.0 MAJOR DEPRESSIVE DISORDER, RECURRENT EPISODE, MILD DEGREE: ICD-10-CM

## 2025-05-12 PROCEDURE — 99204 OFFICE O/P NEW MOD 45 MIN: CPT

## 2025-05-12 RX ORDER — TIRZEPATIDE 2.5 MG/.5ML
2.5 INJECTION, SOLUTION SUBCUTANEOUS WEEKLY
Qty: 2 ML | Refills: 1 | Status: CANCELLED | OUTPATIENT
Start: 2025-05-12

## 2025-05-12 RX ORDER — CLOPIDOGREL BISULFATE 75 MG/1
75 TABLET ORAL EVERY EVENING
COMMUNITY

## 2025-05-12 RX ORDER — AMLODIPINE BESYLATE 5 MG/1
5 TABLET ORAL NIGHTLY
COMMUNITY

## 2025-05-12 RX ORDER — ASPIRIN 81 MG/1
81 TABLET, COATED ORAL EVERY EVENING
COMMUNITY
Start: 2025-05-01

## 2025-05-12 RX ORDER — TIRZEPATIDE 2.5 MG/.5ML
2.5 INJECTION, SOLUTION SUBCUTANEOUS WEEKLY
Qty: 2 ML | Refills: 1 | Status: SHIPPED | OUTPATIENT
Start: 2025-05-12

## 2025-05-12 RX ORDER — LEVOTHYROXINE SODIUM 50 UG/1
50 TABLET ORAL DAILY
COMMUNITY
Start: 2025-05-01

## 2025-05-12 RX ORDER — TIRZEPATIDE 2.5 MG/.5ML
2.5 INJECTION, SOLUTION SUBCUTANEOUS WEEKLY
COMMUNITY
Start: 2025-04-14 | End: 2025-05-12 | Stop reason: SDUPTHER

## 2025-05-12 RX ORDER — METOPROLOL SUCCINATE 50 MG/1
50 TABLET, EXTENDED RELEASE ORAL DAILY
COMMUNITY
Start: 2025-05-01

## 2025-05-12 RX ORDER — ROSUVASTATIN CALCIUM 40 MG/1
40 TABLET, COATED ORAL NIGHTLY
COMMUNITY
Start: 2025-05-01

## 2025-05-12 RX ORDER — HYDROCHLOROTHIAZIDE 25 MG/1
25 TABLET ORAL DAILY
COMMUNITY

## 2025-05-12 NOTE — PROGRESS NOTES
May 12, 2025    Hello, may I speak with Chelsie Ced?    My name is CAROL      I am  with [facility]  [address] [address] [phone].    Before we get started may I verify your date of birth? 1970    I am calling to officially welcome you to our practice and ask about your recent visit. Is this a good time to talk? yes    Tell me about your visit with us. What things went well?  WONDERFUL VISIT       We're always looking for ways to make our patients' experiences even better. Do you have recommendations on ways we may improve?  no    Overall were you satisfied with your first visit to our practice? yes       I appreciate you taking the time to speak with me today. Is there anything else I can do for you? no      Thank you, and have a great day.

## 2025-05-31 DIAGNOSIS — M54.41 CHRONIC BILATERAL LOW BACK PAIN WITH SCIATICA, SCIATICA LATERALITY UNSPECIFIED: ICD-10-CM

## 2025-05-31 DIAGNOSIS — M54.42 CHRONIC BILATERAL LOW BACK PAIN WITH SCIATICA, SCIATICA LATERALITY UNSPECIFIED: ICD-10-CM

## 2025-05-31 DIAGNOSIS — G89.29 CHRONIC BILATERAL LOW BACK PAIN WITH SCIATICA, SCIATICA LATERALITY UNSPECIFIED: ICD-10-CM

## 2025-05-31 DIAGNOSIS — G45.9 TIA (TRANSIENT ISCHEMIC ATTACK): ICD-10-CM

## 2025-05-31 DIAGNOSIS — I10 HYPERTENSION, ESSENTIAL, BENIGN: ICD-10-CM

## 2025-05-31 DIAGNOSIS — G89.29 OTHER CHRONIC PAIN: ICD-10-CM

## 2025-06-02 RX ORDER — AMLODIPINE BESYLATE 5 MG/1
TABLET ORAL
Qty: 30 TABLET | Refills: 5 | Status: SHIPPED | OUTPATIENT
Start: 2025-06-02

## 2025-06-02 RX ORDER — CLOPIDOGREL BISULFATE 75 MG/1
75 TABLET ORAL NIGHTLY
Qty: 30 TABLET | Refills: 1 | Status: SHIPPED | OUTPATIENT
Start: 2025-06-02

## 2025-06-02 RX ORDER — HYDROCHLOROTHIAZIDE 25 MG/1
25 TABLET ORAL DAILY
Qty: 30 TABLET | Refills: 5 | Status: SHIPPED | OUTPATIENT
Start: 2025-06-02

## 2025-06-02 NOTE — TELEPHONE ENCOUNTER
Carina Alvarado called to request a refill on her medication.      Last office visit : 4/14/2025   Next office visit : Visit date not found     Requested Prescriptions     Pending Prescriptions Disp Refills    amitriptyline (ELAVIL) 25 MG tablet [Pharmacy Med Name: AMITRIPTYLINE HYDROCHLORIDE 25MG TABLET] 60 tablet 5     Sig: TAKE 2 TABLETS BY MOUTH DAILY AT BEDTIME    clopidogrel (PLAVIX) 75 MG tablet [Pharmacy Med Name: CLOPIDOGREL 75MG TABLET] 30 tablet 1     Sig: TAKE 1 TABLET BY MOUTH ONCE DAILY IN THE EVENING    amLODIPine (NORVASC) 5 MG tablet [Pharmacy Med Name: AMLODIPINE BESYLATE 5MG TABLET] 30 tablet 5     Sig: TAKE 1 TABLET BY MOUTH ONCE NIGHTLY    hydroCHLOROthiazide (HYDRODIURIL) 25 MG tablet [Pharmacy Med Name: HYDROCHLOROTHIAZIDE 25MG TABLET] 30 tablet 5     Sig: TAKE 1 TABLET BY MOUTH ONCE DAILY            Sarah Frank MA

## 2025-06-06 ENCOUNTER — TRANSCRIBE ORDERS (OUTPATIENT)
Dept: ADMINISTRATIVE | Facility: HOSPITAL | Age: 55
End: 2025-06-06
Payer: COMMERCIAL

## 2025-06-06 DIAGNOSIS — Z13.6 ENCOUNTER FOR SCREENING FOR VASCULAR DISEASE: Primary | ICD-10-CM

## 2025-07-14 ENCOUNTER — TELEPHONE (OUTPATIENT)
Dept: FAMILY MEDICINE CLINIC | Facility: CLINIC | Age: 55
End: 2025-07-14
Payer: COMMERCIAL

## 2025-07-15 DIAGNOSIS — E11.65 TYPE 2 DIABETES MELLITUS WITH HYPERGLYCEMIA, WITHOUT LONG-TERM CURRENT USE OF INSULIN: ICD-10-CM

## 2025-07-16 RX ORDER — TIRZEPATIDE 2.5 MG/.5ML
2.5 INJECTION, SOLUTION SUBCUTANEOUS WEEKLY
Qty: 0.5 ML | Refills: 1 | Status: SHIPPED | OUTPATIENT
Start: 2025-07-16

## 2025-07-26 DIAGNOSIS — G45.9 TIA (TRANSIENT ISCHEMIC ATTACK): ICD-10-CM

## 2025-07-28 RX ORDER — CLOPIDOGREL BISULFATE 75 MG/1
75 TABLET ORAL NIGHTLY
Qty: 30 TABLET | Refills: 1 | Status: SHIPPED | OUTPATIENT
Start: 2025-07-28

## 2025-07-28 NOTE — TELEPHONE ENCOUNTER
Carina Alvarado called to request a refill on her medication.      Last office visit : 4/14/2025   Next office visit : Visit date not found ([facility] sent to pt to schedule appt.)    Requested Prescriptions     Pending Prescriptions Disp Refills    clopidogrel (PLAVIX) 75 MG tablet [Pharmacy Med Name: CLOPIDOGREL 75MG TABLET] 30 tablet 1     Sig: TAKE 1 TABLET BY MOUTH ONCE DAILY IN THE EVENING            Sarah Frank MA

## 2025-08-01 ENCOUNTER — HOSPITAL ENCOUNTER (OUTPATIENT)
Dept: ULTRASOUND IMAGING | Facility: HOSPITAL | Age: 55
Discharge: HOME | End: 2025-08-01

## 2025-08-01 ENCOUNTER — LAB (OUTPATIENT)
Dept: LAB | Facility: HOSPITAL | Age: 55
End: 2025-08-01

## 2025-08-01 DIAGNOSIS — E78.2 MIXED HYPERLIPIDEMIA: ICD-10-CM

## 2025-08-01 DIAGNOSIS — Z13.6 ENCOUNTER FOR SCREENING FOR VASCULAR DISEASE: ICD-10-CM

## 2025-08-01 DIAGNOSIS — E11.65 TYPE 2 DIABETES MELLITUS WITH HYPERGLYCEMIA, WITHOUT LONG-TERM CURRENT USE OF INSULIN: ICD-10-CM

## 2025-08-01 DIAGNOSIS — R74.8 ELEVATED LIVER ENZYMES: ICD-10-CM

## 2025-08-01 PROCEDURE — 93799 UNLISTED CV SVC/PROCEDURE: CPT

## 2025-08-01 PROCEDURE — 36415 COLL VENOUS BLD VENIPUNCTURE: CPT

## 2025-08-04 ENCOUNTER — RESULTS FOLLOW-UP (OUTPATIENT)
Dept: ADMINISTRATIVE | Facility: HOSPITAL | Age: 55
End: 2025-08-04
Payer: COMMERCIAL

## 2025-08-11 ENCOUNTER — LAB (OUTPATIENT)
Dept: LAB | Facility: HOSPITAL | Age: 55
End: 2025-08-11
Payer: COMMERCIAL

## 2025-08-11 ENCOUNTER — RESULTS FOLLOW-UP (OUTPATIENT)
Dept: FAMILY MEDICINE CLINIC | Facility: CLINIC | Age: 55
End: 2025-08-11

## 2025-08-11 LAB
ALBUMIN SERPL-MCNC: 4.3 G/DL (ref 3.5–5.2)
ALBUMIN/GLOB SERPL: 1.5 G/DL
ALP SERPL-CCNC: 47 U/L (ref 39–117)
ALT SERPL W P-5'-P-CCNC: 28 U/L (ref 1–33)
ANION GAP SERPL CALCULATED.3IONS-SCNC: 11 MMOL/L (ref 5–15)
AST SERPL-CCNC: 32 U/L (ref 1–32)
BILIRUB SERPL-MCNC: 0.4 MG/DL (ref 0–1.2)
BUN SERPL-MCNC: 7.7 MG/DL (ref 6–20)
BUN/CREAT SERPL: 10 (ref 7–25)
CALCIUM SPEC-SCNC: 9.7 MG/DL (ref 8.6–10.5)
CHLORIDE SERPL-SCNC: 101 MMOL/L (ref 98–107)
CHOLEST SERPL-MCNC: 141 MG/DL (ref 0–200)
CO2 SERPL-SCNC: 25 MMOL/L (ref 22–29)
CREAT SERPL-MCNC: 0.77 MG/DL (ref 0.57–1)
EGFRCR SERPLBLD CKD-EPI 2021: 92.4 ML/MIN/1.73
GLOBULIN UR ELPH-MCNC: 2.8 GM/DL
GLUCOSE SERPL-MCNC: 105 MG/DL (ref 65–99)
HBA1C MFR BLD: 6.2 % (ref 4.8–5.6)
HDLC SERPL-MCNC: 37 MG/DL (ref 40–60)
LDLC SERPL CALC-MCNC: 59 MG/DL (ref 0–100)
LDLC/HDLC SERPL: 1.27 {RATIO}
POTASSIUM SERPL-SCNC: 4.7 MMOL/L (ref 3.5–5.2)
PROT SERPL-MCNC: 7.1 G/DL (ref 6–8.5)
SODIUM SERPL-SCNC: 137 MMOL/L (ref 136–145)
TRIGL SERPL-MCNC: 285 MG/DL (ref 0–150)
VLDLC SERPL-MCNC: 45 MG/DL (ref 5–40)

## 2025-08-11 PROCEDURE — 80053 COMPREHEN METABOLIC PANEL: CPT

## 2025-08-11 PROCEDURE — 80061 LIPID PANEL: CPT

## 2025-08-11 PROCEDURE — 83036 HEMOGLOBIN GLYCOSYLATED A1C: CPT

## 2025-08-11 PROCEDURE — 36415 COLL VENOUS BLD VENIPUNCTURE: CPT

## 2025-08-18 ENCOUNTER — OFFICE VISIT (OUTPATIENT)
Dept: FAMILY MEDICINE CLINIC | Facility: CLINIC | Age: 55
End: 2025-08-18
Payer: COMMERCIAL

## 2025-08-18 ENCOUNTER — APPOINTMENT (OUTPATIENT)
Dept: CT IMAGING | Facility: HOSPITAL | Age: 55
End: 2025-08-18
Payer: COMMERCIAL

## 2025-08-18 ENCOUNTER — HOSPITAL ENCOUNTER (EMERGENCY)
Facility: HOSPITAL | Age: 55
Discharge: HOME | End: 2025-08-18
Admitting: EMERGENCY MEDICINE
Payer: COMMERCIAL

## 2025-08-18 ENCOUNTER — APPOINTMENT (OUTPATIENT)
Dept: GENERAL RADIOLOGY | Facility: HOSPITAL | Age: 55
End: 2025-08-18
Payer: COMMERCIAL

## 2025-08-18 VITALS
OXYGEN SATURATION: 99 % | SYSTOLIC BLOOD PRESSURE: 139 MMHG | HEIGHT: 66 IN | RESPIRATION RATE: 11 BRPM | HEART RATE: 63 BPM | TEMPERATURE: 98.1 F | BODY MASS INDEX: 23.3 KG/M2 | WEIGHT: 145 LBS | DIASTOLIC BLOOD PRESSURE: 81 MMHG

## 2025-08-18 VITALS
HEIGHT: 66 IN | TEMPERATURE: 96.9 F | OXYGEN SATURATION: 97 % | BODY MASS INDEX: 23.3 KG/M2 | HEART RATE: 72 BPM | WEIGHT: 145 LBS | DIASTOLIC BLOOD PRESSURE: 70 MMHG | SYSTOLIC BLOOD PRESSURE: 118 MMHG

## 2025-08-18 DIAGNOSIS — R07.9 CHEST PAIN, UNSPECIFIED TYPE: Primary | ICD-10-CM

## 2025-08-18 DIAGNOSIS — E11.65 TYPE 2 DIABETES MELLITUS WITH HYPERGLYCEMIA, WITHOUT LONG-TERM CURRENT USE OF INSULIN: ICD-10-CM

## 2025-08-18 DIAGNOSIS — I10 ESSENTIAL HYPERTENSION: ICD-10-CM

## 2025-08-18 DIAGNOSIS — E78.2 MIXED HYPERLIPIDEMIA: ICD-10-CM

## 2025-08-18 LAB
ALBUMIN SERPL-MCNC: 4.8 G/DL (ref 3.5–5.2)
ALBUMIN/GLOB SERPL: 1.5 G/DL
ALP SERPL-CCNC: 48 U/L (ref 39–117)
ALT SERPL W P-5'-P-CCNC: 24 U/L (ref 1–33)
ANION GAP SERPL CALCULATED.3IONS-SCNC: 12 MMOL/L (ref 5–15)
APTT PPP: 21.3 SECONDS (ref 24.5–36)
AST SERPL-CCNC: 27 U/L (ref 1–32)
BASOPHILS # BLD AUTO: 0.09 10*3/MM3 (ref 0–0.2)
BASOPHILS NFR BLD AUTO: 1.2 % (ref 0–1.5)
BILIRUB SERPL-MCNC: 0.5 MG/DL (ref 0–1.2)
BUN SERPL-MCNC: 8.3 MG/DL (ref 6–20)
BUN/CREAT SERPL: 10 (ref 7–25)
CALCIUM SPEC-SCNC: 10.7 MG/DL (ref 8.6–10.5)
CHLORIDE SERPL-SCNC: 101 MMOL/L (ref 98–107)
CO2 SERPL-SCNC: 28 MMOL/L (ref 22–29)
CREAT SERPL-MCNC: 0.83 MG/DL (ref 0.57–1)
D DIMER PPP FEU-MCNC: 3.84 MCGFEU/ML (ref 0–0.53)
DEPRECATED RDW RBC AUTO: 39.2 FL (ref 37–54)
EGFRCR SERPLBLD CKD-EPI 2021: 84.4 ML/MIN/1.73
EOSINOPHIL # BLD AUTO: 0.13 10*3/MM3 (ref 0–0.4)
EOSINOPHIL NFR BLD AUTO: 1.8 % (ref 0.3–6.2)
ERYTHROCYTE [DISTWIDTH] IN BLOOD BY AUTOMATED COUNT: 12.2 % (ref 12.3–15.4)
GEN 5 1HR TROPONIN T REFLEX: <6 NG/L
GLOBULIN UR ELPH-MCNC: 3.1 GM/DL
GLUCOSE SERPL-MCNC: 95 MG/DL (ref 65–99)
HCT VFR BLD AUTO: 44.2 % (ref 34–46.6)
HGB BLD-MCNC: 14.7 G/DL (ref 12–15.9)
HOLD SPECIMEN: NORMAL
HOLD SPECIMEN: NORMAL
IMM GRANULOCYTES # BLD AUTO: 0.01 10*3/MM3 (ref 0–0.05)
IMM GRANULOCYTES NFR BLD AUTO: 0.1 % (ref 0–0.5)
INR PPP: 0.93 (ref 0.91–1.09)
LIPASE SERPL-CCNC: 40 U/L (ref 13–60)
LYMPHOCYTES # BLD AUTO: 2.63 10*3/MM3 (ref 0.7–3.1)
LYMPHOCYTES NFR BLD AUTO: 35.8 % (ref 19.6–45.3)
MAGNESIUM SERPL-MCNC: 2.4 MG/DL (ref 1.6–2.6)
MCH RBC QN AUTO: 29.1 PG (ref 26.6–33)
MCHC RBC AUTO-ENTMCNC: 33.3 G/DL (ref 31.5–35.7)
MCV RBC AUTO: 87.5 FL (ref 79–97)
MONOCYTES # BLD AUTO: 0.55 10*3/MM3 (ref 0.1–0.9)
MONOCYTES NFR BLD AUTO: 7.5 % (ref 5–12)
NEUTROPHILS NFR BLD AUTO: 3.93 10*3/MM3 (ref 1.7–7)
NEUTROPHILS NFR BLD AUTO: 53.6 % (ref 42.7–76)
NRBC BLD AUTO-RTO: 0 /100 WBC (ref 0–0.2)
NT-PROBNP SERPL-MCNC: 186.1 PG/ML (ref 0–900)
PLATELET # BLD AUTO: 278 10*3/MM3 (ref 140–450)
PMV BLD AUTO: 10.6 FL (ref 6–12)
POTASSIUM SERPL-SCNC: 4.1 MMOL/L (ref 3.5–5.2)
PROT SERPL-MCNC: 7.9 G/DL (ref 6–8.5)
PROTHROMBIN TIME: 12.9 SECONDS (ref 11.8–14.8)
RBC # BLD AUTO: 5.05 10*6/MM3 (ref 3.77–5.28)
SODIUM SERPL-SCNC: 141 MMOL/L (ref 136–145)
TROPONIN T NUMERIC DELTA: NORMAL
TROPONIN T SERPL HS-MCNC: <6 NG/L
WBC NRBC COR # BLD AUTO: 7.34 10*3/MM3 (ref 3.4–10.8)
WHOLE BLOOD HOLD COAG: NORMAL
WHOLE BLOOD HOLD SPECIMEN: NORMAL

## 2025-08-18 PROCEDURE — 85025 COMPLETE CBC W/AUTO DIFF WBC: CPT

## 2025-08-18 PROCEDURE — 93000 ELECTROCARDIOGRAM COMPLETE: CPT

## 2025-08-18 PROCEDURE — 83690 ASSAY OF LIPASE: CPT | Performed by: PHYSICIAN ASSISTANT

## 2025-08-18 PROCEDURE — 71045 X-RAY EXAM CHEST 1 VIEW: CPT

## 2025-08-18 PROCEDURE — 83735 ASSAY OF MAGNESIUM: CPT | Performed by: STUDENT IN AN ORGANIZED HEALTH CARE EDUCATION/TRAINING PROGRAM

## 2025-08-18 PROCEDURE — 84484 ASSAY OF TROPONIN QUANT: CPT

## 2025-08-18 PROCEDURE — 85379 FIBRIN DEGRADATION QUANT: CPT | Performed by: PHYSICIAN ASSISTANT

## 2025-08-18 PROCEDURE — 93005 ELECTROCARDIOGRAM TRACING: CPT | Performed by: STUDENT IN AN ORGANIZED HEALTH CARE EDUCATION/TRAINING PROGRAM

## 2025-08-18 PROCEDURE — 83880 ASSAY OF NATRIURETIC PEPTIDE: CPT | Performed by: STUDENT IN AN ORGANIZED HEALTH CARE EDUCATION/TRAINING PROGRAM

## 2025-08-18 PROCEDURE — 96374 THER/PROPH/DIAG INJ IV PUSH: CPT

## 2025-08-18 PROCEDURE — 25510000001 IOPAMIDOL PER 1 ML: Performed by: STUDENT IN AN ORGANIZED HEALTH CARE EDUCATION/TRAINING PROGRAM

## 2025-08-18 PROCEDURE — 85610 PROTHROMBIN TIME: CPT | Performed by: STUDENT IN AN ORGANIZED HEALTH CARE EDUCATION/TRAINING PROGRAM

## 2025-08-18 PROCEDURE — 93005 ELECTROCARDIOGRAM TRACING: CPT

## 2025-08-18 PROCEDURE — 71275 CT ANGIOGRAPHY CHEST: CPT

## 2025-08-18 PROCEDURE — 99285 EMERGENCY DEPT VISIT HI MDM: CPT

## 2025-08-18 PROCEDURE — 36415 COLL VENOUS BLD VENIPUNCTURE: CPT

## 2025-08-18 PROCEDURE — 25010000002 FAMOTIDINE 10 MG/ML SOLUTION: Performed by: PHYSICIAN ASSISTANT

## 2025-08-18 PROCEDURE — 99214 OFFICE O/P EST MOD 30 MIN: CPT

## 2025-08-18 PROCEDURE — 85730 THROMBOPLASTIN TIME PARTIAL: CPT | Performed by: STUDENT IN AN ORGANIZED HEALTH CARE EDUCATION/TRAINING PROGRAM

## 2025-08-18 PROCEDURE — 80053 COMPREHEN METABOLIC PANEL: CPT

## 2025-08-18 RX ORDER — ALUMINA, MAGNESIA, AND SIMETHICONE 2400; 2400; 240 MG/30ML; MG/30ML; MG/30ML
15 SUSPENSION ORAL ONCE
Status: COMPLETED | OUTPATIENT
Start: 2025-08-18 | End: 2025-08-18

## 2025-08-18 RX ORDER — ASPIRIN 81 MG/1
324 TABLET, CHEWABLE ORAL ONCE
Status: COMPLETED | OUTPATIENT
Start: 2025-08-18 | End: 2025-08-18

## 2025-08-18 RX ORDER — FAMOTIDINE 10 MG/ML
20 INJECTION, SOLUTION INTRAVENOUS ONCE
Status: COMPLETED | OUTPATIENT
Start: 2025-08-18 | End: 2025-08-18

## 2025-08-18 RX ORDER — IOPAMIDOL 755 MG/ML
100 INJECTION, SOLUTION INTRAVASCULAR
Status: COMPLETED | OUTPATIENT
Start: 2025-08-18 | End: 2025-08-18

## 2025-08-18 RX ORDER — SODIUM CHLORIDE 0.9 % (FLUSH) 0.9 %
10 SYRINGE (ML) INJECTION AS NEEDED
Status: DISCONTINUED | OUTPATIENT
Start: 2025-08-18 | End: 2025-08-18 | Stop reason: HOSPADM

## 2025-08-18 RX ADMIN — IOPAMIDOL 100 ML: 755 INJECTION, SOLUTION INTRAVENOUS at 18:23

## 2025-08-18 RX ADMIN — ASPIRIN 81 MG CHEWABLE TABLET 324 MG: 81 TABLET CHEWABLE at 14:36

## 2025-08-18 RX ADMIN — ALUMINUM HYDROXIDE, MAGNESIUM HYDROXIDE, AND DIMETHICONE 15 ML: 400; 400; 40 SUSPENSION ORAL at 17:34

## 2025-08-18 RX ADMIN — FAMOTIDINE 20 MG: 10 INJECTION INTRAVENOUS at 17:34

## 2025-08-21 LAB
QT INTERVAL: 420 MS
QT INTERVAL: 428 MS
QTC INTERVAL: 429 MS
QTC INTERVAL: 441 MS

## 2025-08-22 ENCOUNTER — OFFICE VISIT (OUTPATIENT)
Dept: FAMILY MEDICINE CLINIC | Facility: CLINIC | Age: 55
End: 2025-08-22
Payer: COMMERCIAL

## 2025-08-22 VITALS
BODY MASS INDEX: 23.3 KG/M2 | SYSTOLIC BLOOD PRESSURE: 120 MMHG | HEIGHT: 66 IN | TEMPERATURE: 96.9 F | OXYGEN SATURATION: 99 % | HEART RATE: 64 BPM | DIASTOLIC BLOOD PRESSURE: 80 MMHG | WEIGHT: 145 LBS

## 2025-08-22 DIAGNOSIS — Z12.31 BREAST CANCER SCREENING BY MAMMOGRAM: ICD-10-CM

## 2025-08-22 DIAGNOSIS — Z00.00 ANNUAL PHYSICAL EXAM: Primary | ICD-10-CM

## 2025-08-22 DIAGNOSIS — E11.9 ENCOUNTER FOR DIABETIC FOOT EXAM: ICD-10-CM

## 2025-08-22 DIAGNOSIS — R07.9 CHEST PAIN, UNSPECIFIED TYPE: ICD-10-CM

## 2025-08-22 DIAGNOSIS — K59.03 DRUG-INDUCED CONSTIPATION: ICD-10-CM

## 2025-08-22 DIAGNOSIS — K21.9 GASTROESOPHAGEAL REFLUX DISEASE, UNSPECIFIED WHETHER ESOPHAGITIS PRESENT: ICD-10-CM

## 2025-08-22 DIAGNOSIS — Z23 PNEUMOCOCCAL VACCINATION GIVEN: ICD-10-CM

## 2025-08-22 DIAGNOSIS — E11.65 TYPE 2 DIABETES MELLITUS WITH HYPERGLYCEMIA, WITHOUT LONG-TERM CURRENT USE OF INSULIN: ICD-10-CM

## 2025-08-22 PROCEDURE — 90471 IMMUNIZATION ADMIN: CPT

## 2025-08-22 PROCEDURE — 90677 PCV20 VACCINE IM: CPT

## 2025-08-22 PROCEDURE — 99396 PREV VISIT EST AGE 40-64: CPT

## 2025-08-22 RX ORDER — OMEPRAZOLE 40 MG/1
40 CAPSULE, DELAYED RELEASE ORAL DAILY
Qty: 90 CAPSULE | Refills: 0 | Status: SHIPPED | OUTPATIENT
Start: 2025-08-22